# Patient Record
Sex: FEMALE | Race: WHITE | NOT HISPANIC OR LATINO | Employment: FULL TIME | URBAN - METROPOLITAN AREA
[De-identification: names, ages, dates, MRNs, and addresses within clinical notes are randomized per-mention and may not be internally consistent; named-entity substitution may affect disease eponyms.]

---

## 2017-02-10 ENCOUNTER — OFFICE VISIT (OUTPATIENT)
Dept: FAMILY MEDICINE | Facility: CLINIC | Age: 37
End: 2017-02-10
Payer: COMMERCIAL

## 2017-02-10 VITALS
SYSTOLIC BLOOD PRESSURE: 138 MMHG | BODY MASS INDEX: 43.64 KG/M2 | DIASTOLIC BLOOD PRESSURE: 76 MMHG | TEMPERATURE: 98.7 F | WEIGHT: 255.6 LBS | HEIGHT: 64 IN | HEART RATE: 84 BPM

## 2017-02-10 DIAGNOSIS — M77.8 LEFT SHOULDER TENDINITIS: ICD-10-CM

## 2017-02-10 DIAGNOSIS — Z00.00 ROUTINE GENERAL MEDICAL EXAMINATION AT A HEALTH CARE FACILITY: ICD-10-CM

## 2017-02-10 DIAGNOSIS — Z12.4 SCREENING FOR MALIGNANT NEOPLASM OF CERVIX: Primary | ICD-10-CM

## 2017-02-10 DIAGNOSIS — Z12.31 ENCOUNTER FOR SCREENING MAMMOGRAM FOR BREAST CANCER: ICD-10-CM

## 2017-02-10 DIAGNOSIS — J01.00 ACUTE NON-RECURRENT MAXILLARY SINUSITIS: ICD-10-CM

## 2017-02-10 PROCEDURE — 99395 PREV VISIT EST AGE 18-39: CPT | Performed by: NURSE PRACTITIONER

## 2017-02-10 PROCEDURE — G0145 SCR C/V CYTO,THINLAYER,RESCR: HCPCS | Performed by: NURSE PRACTITIONER

## 2017-02-10 PROCEDURE — 87624 HPV HI-RISK TYP POOLED RSLT: CPT | Performed by: NURSE PRACTITIONER

## 2017-02-10 PROCEDURE — 99213 OFFICE O/P EST LOW 20 MIN: CPT | Mod: 25 | Performed by: NURSE PRACTITIONER

## 2017-02-10 RX ORDER — NAPROXEN SODIUM 550 MG/1
TABLET ORAL
Qty: 60 TABLET | Refills: 0 | Status: SHIPPED | OUTPATIENT
Start: 2017-02-10 | End: 2017-12-08

## 2017-02-10 RX ORDER — CEFDINIR 300 MG/1
300 CAPSULE ORAL 2 TIMES DAILY
Qty: 20 CAPSULE | Refills: 0 | Status: SHIPPED | OUTPATIENT
Start: 2017-02-10 | End: 2017-03-08

## 2017-02-10 ASSESSMENT — ENCOUNTER SYMPTOMS
CHILLS: 0
FEVER: 0
NERVOUS/ANXIOUS: 0
NAUSEA: 1
PALPITATIONS: 0
SORE THROAT: 1
COUGH: 1
EYE DISCHARGE: 0
DIAPHORESIS: 0
LIGHT-HEADEDNESS: 0
ACTIVITY CHANGE: 0
ARTHRALGIAS: 1
WHEEZING: 0
POLYDIPSIA: 0
ABDOMINAL PAIN: 0
HEADACHES: 0
ROS SKIN COMMENTS: SELF BREAST EXAM WITHOUT AREA OF CONCERN.
FREQUENCY: 0
RHINORRHEA: 0
DIZZINESS: 0
CHEST TIGHTNESS: 0
FATIGUE: 0
DIFFICULTY URINATING: 0
CONSTIPATION: 0
VOMITING: 0
SHORTNESS OF BREATH: 0
SLEEP DISTURBANCE: 0
POLYPHAGIA: 0
SINUS PRESSURE: 0
NUMBNESS: 0
EYE ITCHING: 0
DIARRHEA: 0
DYSPHORIC MOOD: 0
ABDOMINAL DISTENTION: 0

## 2017-02-10 NOTE — PATIENT INSTRUCTIONS
Return for fasting lab appointment.         Preventive Health Recommendations  Female Ages 26 - 39  Yearly exam:   See your health care provider every year in order to    Review health changes.     Discuss preventive care.      Review your medicines if you your doctor has prescribed any.    Until age 30: Get a Pap test every three years (more often if you have had an abnormal result).    After age 30: Talk to your doctor about whether you should have a Pap test every 3 years or have a Pap test with HPV screening every 5 years.   You do not need a Pap test if your uterus was removed (hysterectomy) and you have not had cancer.  You should be tested each year for STDs (sexually transmitted diseases), if you're at risk.   Talk to your provider about how often to have your cholesterol checked.  If you are at risk for diabetes, you should have a diabetes test (fasting glucose).  Shots: Get a flu shot each year. Get a tetanus shot every 10 years.   Nutrition:     Eat at least 5 servings of fruits and vegetables each day.    Eat whole-grain bread, whole-wheat pasta and brown rice instead of white grains and rice.    Talk to your provider about Calcium and Vitamin D.     Lifestyle    Exercise at least 150 minutes a week (30 minutes a day, 5 days of the week). This will help you control your weight and prevent disease.    Limit alcohol to one drink per day.    No smoking.     Wear sunscreen to prevent skin cancer.    See your dentist every six months for an exam and cleaning.    Acute Sinusitis    Acute sinusitis is inflammation (irritation and swelling) of the sinuses. It is usually from a bacterial infection that follows an upper respiratory viral infection. Your doctor can help you find relief. Read on to learn more.  What is acute sinusitis?  Sinuses are air-filled spaces in the skull behind the face. They are kept moist and clean by a lining of mucosa. Things such as pollen, smoke, and chemical fumes can irritate the  mucosa. It can then become inflamed (swell up). As a response to irritation, the mucosa makes more mucus and other fluids. Tiny hairlike cilia cover the mucosa. Cilia help transport mucus toward the opening of the sinus. Too much mucus may cause the cilia to stop working. This blocks the sinus opening. A buildup of fluid in the sinuses then leads to symptoms such as pain and pressure. It can also encourage growth of bacteria in the sinuses.  Common symptoms of acute sinusitis  You may have:    Facial soreness pain    Headache    Fever    Postnasal drip (drainage in the back of the throat)    Congestion    Drainage that is thick and colored, instead of clear    Cough  Diagnosis of acute sinusitis  The doctor will ask about your symptoms and medical history. He or she will examine your ear, nose, and throat. X-rays are usually not needed. If your sinusitis recurs, you may have a culture to check for bacteria or imaging tests.     An evaluation will be done. A culture (sample of mucus) is sometimes taken to check for bacteria. If you have multiple bouts of sinusitis, imaging (X-rays or CAT scans) may be done to check for an anatomic cause of the infection.  Treatment of acute sinusitis  Treatment is designed to unblock the sinus opening and help the cilia work again. Antihistamine and decongestant medications may be prescribed. These can reduce inflammation and decrease fluid production. If a bacterial infection is present, it is treated with antibiotic medication for 10 to 14 days. This medication should be taken until it is gone, even if you feel better.    2568-3170 [employer]. [address] [place] [address], [place] [address]. All rights reserved. This information is not intended as a substitute for professional medical care. Always follow your healthcare professional's instructions.        Tendonitis  A tendon is the thick fibrous cord that joins muscle to bone and allows joints to move. When a tendon  becomes inflamed, it is called tendonitis. This can occur from overuse, injury, or infection. This usually involves the shoulders, forearm, wrist, hands and foot. Symptoms include pain, swelling and tenderness to the touch. Moving the joint increases the pain.  It takes 4 to 6 weeks for tendonitis to heal. It is treated by preventing motion of the tendon with a splint or brace and the use of anti-inflammatory medicine.  Home care    Some people find relief with ice packs. These can be crushed or cubed ice in a plastic bag or a bag of frozen vegetables wrapped in a thin towel. Other people get better relief with heat. This can include a hot shower, hot bath, or a moist towel warmed in a microwave. Try each and use the method that feels best, for 15 to 20 minutes several times a day.    Rest the inflamed joint and protect it from movement.    You may use over-the-counter ibuprofen or naproxen to treat pain and inflammation, unless another medicine was prescribed. If you can't take these medicines, acetaminophen may help with the pain, but does not treat inflammation. If you have chronic liver or kidney disease or ever had a stomach ulcer or gastrointestinal bleeding, talk with your doctor before using these medicines.    As your symptoms improve, begin gradual motion at the involved joint.  Follow-up care  Follow up with your healthcare provider if you are not improving after 5 days of treatment.  When to seek medical advice  Call your healthcare provider right away if any of these occur:    Redness over the painful area    Increasing pain or swelling at the joint    Fever (1 degree above your normal temperature) lasting 24 to 48 hours Or, whatever your healthcare provider told you to report based on your condition    4933-2898 [employer]. [address] [place] [address], [place] [address]. All rights reserved. This information is not intended as a substitute for professional medical care. Always follow your  healthcare professional's instructions.

## 2017-02-10 NOTE — MR AVS SNAPSHOT
After Visit Summary   2/10/2017    Jennifer Valentin    MRN: [id number]           Patient Information     Date Of Birth          1980        Visit Information        Provider Department      2/10/2017 2:00 PM Julissa Garza APRN [facility]        Today's Diagnoses     Screening for malignant neoplasm of cervix    -  1     Encounter for screening mammogram for breast cancer         Routine general medical examination at a health care facility         Acute non-recurrent maxillary sinusitis         Left shoulder tendinitis           Care Instructions      Return for fasting lab appointment.         Preventive Health Recommendations  Female Ages 26 - 39  Yearly exam:   See your health care provider every year in order to    Review health changes.     Discuss preventive care.      Review your medicines if you your doctor has prescribed any.    Until age 30: Get a Pap test every three years (more often if you have had an abnormal result).    After age 30: Talk to your doctor about whether you should have a Pap test every 3 years or have a Pap test with HPV screening every 5 years.   You do not need a Pap test if your uterus was removed (hysterectomy) and you have not had cancer.  You should be tested each year for STDs (sexually transmitted diseases), if you're at risk.   Talk to your provider about how often to have your cholesterol checked.  If you are at risk for diabetes, you should have a diabetes test (fasting glucose).  Shots: Get a flu shot each year. Get a tetanus shot every 10 years.   Nutrition:     Eat at least 5 servings of fruits and vegetables each day.    Eat whole-grain bread, whole-wheat pasta and brown rice instead of white grains and rice.    Talk to your provider about Calcium and Vitamin D.     Lifestyle    Exercise at least 150 minutes a week (30 minutes a day, 5 days of the week). This will help you control your weight and prevent disease.    Limit  alcohol to one drink per day.    No smoking.     Wear sunscreen to prevent skin cancer.    See your dentist every six months for an exam and cleaning.    Acute Sinusitis    Acute sinusitis is inflammation (irritation and swelling) of the sinuses. It is usually from a bacterial infection that follows an upper respiratory viral infection. Your doctor can help you find relief. Read on to learn more.  What is acute sinusitis?  Sinuses are air-filled spaces in the skull behind the face. They are kept moist and clean by a lining of mucosa. Things such as pollen, smoke, and chemical fumes can irritate the mucosa. It can then become inflamed (swell up). As a response to irritation, the mucosa makes more mucus and other fluids. Tiny hairlike cilia cover the mucosa. Cilia help transport mucus toward the opening of the sinus. Too much mucus may cause the cilia to stop working. This blocks the sinus opening. A buildup of fluid in the sinuses then leads to symptoms such as pain and pressure. It can also encourage growth of bacteria in the sinuses.  Common symptoms of acute sinusitis  You may have:    Facial soreness pain    Headache    Fever    Postnasal drip (drainage in the back of the throat)    Congestion    Drainage that is thick and colored, instead of clear    Cough  Diagnosis of acute sinusitis  The doctor will ask about your symptoms and medical history. He or she will examine your ear, nose, and throat. X-rays are usually not needed. If your sinusitis recurs, you may have a culture to check for bacteria or imaging tests.     An evaluation will be done. A culture (sample of mucus) is sometimes taken to check for bacteria. If you have multiple bouts of sinusitis, imaging (X-rays or CAT scans) may be done to check for an anatomic cause of the infection.  Treatment of acute sinusitis  Treatment is designed to unblock the sinus opening and help the cilia work again. Antihistamine and decongestant medications may be  prescribed. These can reduce inflammation and decrease fluid production. If a bacterial infection is present, it is treated with antibiotic medication for 10 to 14 days. This medication should be taken until it is gone, even if you feel better.    5613-2748 [employer]. [address] [place] [address]. All rights reserved. This information is not intended as a substitute for professional medical care. Always follow your healthcare professional's instructions.        Tendonitis  A tendon is the thick fibrous cord that joins muscle to bone and allows joints to move. When a tendon becomes inflamed, it is called tendonitis. This can occur from overuse, injury, or infection. This usually involves the shoulders, forearm, wrist, hands and foot. Symptoms include pain, swelling and tenderness to the touch. Moving the joint increases the pain.  It takes 4 to 6 weeks for tendonitis to heal. It is treated by preventing motion of the tendon with a splint or brace and the use of anti-inflammatory medicine.  Home care    Some people find relief with ice packs. These can be crushed or cubed ice in a plastic bag or a bag of frozen vegetables wrapped in a thin towel. Other people get better relief with heat. This can include a hot shower, hot bath, or a moist towel warmed in a microwave. Try each and use the method that feels best, for 15 to 20 minutes several times a day.    Rest the inflamed joint and protect it from movement.    You may use over-the-counter ibuprofen or naproxen to treat pain and inflammation, unless another medicine was prescribed. If you can't take these medicines, acetaminophen may help with the pain, but does not treat inflammation. If you have chronic liver or kidney disease or ever had a stomach ulcer or gastrointestinal bleeding, talk with your doctor before using these medicines.    As your symptoms improve, begin gradual motion at the involved joint.  Follow-up care  Follow up with  your healthcare provider if you are not improving after 5 days of treatment.  When to seek medical advice  Call your healthcare provider right away if any of these occur:    Redness over the painful area    Increasing pain or swelling at the joint    Fever (1 degree above your normal temperature) lasting 24 to 48 hours Or, whatever your healthcare provider told you to report based on your condition    9970-4445 [employer]. [address] [place] [address]. All rights reserved. This information is not intended as a substitute for professional medical care. Always follow your healthcare professional's instructions.              Follow-ups after your visit        Future tests that were ordered for you today     Open Future Orders        Priority Expected Expires Ordered    *MA Screening Digital Bilateral Routine  2/10/2018 2/10/2017    CBC with platelets differential Routine  2/10/2018 2/10/2017    Comprehensive metabolic panel Routine  2/10/2018 2/10/2017    TSH with free T4 reflex Routine  2/10/2018 2/10/2017    Lipid panel reflex to direct LDL Routine  2/10/2018 2/10/2017            Who to contact     Normal or non-critical lab and imaging results will be communicated to you by [employer], letter or phone within 4 business days after the clinic has received the results. If you do not hear from us within 7 days, please contact the clinic through [employer] or phone. If you have a critical or abnormal lab result, we will notify you by phone as soon as possible.  Submit refill requests through [employer] or call your pharmacy and they will forward the refill request to us. Please allow 3 business days for your refill to be completed.          If you need to speak with a  for additional information , please call: [phone]           Additional Information About Your Visit        Last 2 Left Information     [employer] Left lets you send messages to your doctor, view your test results, renew your  "prescriptions, schedule appointments and more. To sign up, go to [email or web address] . Click on \"Log in\" on the left side of the screen, which will take you to the Welcome page. Then click on \"Sign up Now\" on the right side of the page.     You will be asked to enter the access code listed below, as well as some personal information. Please follow the directions to create your username and password.     Your access code is: [email or web address]  Expires: 2017  2:33 PM     Your access code will  in 90 days. If you need help or a new code, please call your [facility] clinic or [phone].        Care EveryWhere ID     This is your Care EveryWhere ID. This could be used by other organizations to access your [facility] medical records  [id number]        Your Vitals Were     Pulse Temperature Height BMI (Body Mass Index) Last Period       84 98.7  F (37.1  C) (Tympanic) 5' 3.75\" (1.619 m) 44.23 kg/m2 2017        Blood Pressure from Last 3 Encounters:   02/10/17 138/76   11/17/15 122/78   07/15/15 120/74    Weight from Last 3 Encounters:   02/10/17 255 lb 9.6 oz (115.939 kg)   11/17/15 232 lb (105.235 kg)   07/15/15 219 lb (99.338 kg)              We Performed the Following     HPV High Risk Types DNA Cervical     Pap imaged thin layer screen with HPV - recommended age 30 - 65 years (select HPV order below)          Today's Medication Changes          These changes are accurate as of: 2/10/17  2:33 PM.  If you have any questions, ask your nurse or doctor.               Start taking these medicines.        Dose/Directions    cefdinir 300 MG capsule   Commonly known as:  OMNICEF   Used for:  Acute non-recurrent maxillary sinusitis   Started by:  Julissa Garza APRN CNP        Dose:  300 mg   Take 1 capsule (300 mg) by mouth 2 times daily   Quantity:  20 capsule   Refills:  0       naproxen sodium 550 MG tablet   Commonly known as:  ANAPROX   Used for:  Left shoulder tendinitis   Started by:  Greg" NEO Pappas CNP        Take twice per day with food for 14 days and then as needed   Quantity:  60 tablet   Refills:  0            Where to get your medicines      These medications were sent to [place] [address] [facility] [address] [facility]  [address] [facility] [address]     Phone:  [phone]    - cefdinir 300 MG capsule  - naproxen sodium 550 MG tablet             Primary Care Provider Office Phone # Fax #    Birgit Curtis [phone]       [facility] [address] [facility]   [place] [facility] [address]        Thank you!     Thank you for choosing [facility]  for your care. Our goal is always to provide you with excellent care. Hearing back from our patients is one way we can continue to improve our services. Please take a few minutes to complete the written survey that you may receive in the mail after your visit with us. Thank you!             Your Updated Medication List - Protect others around you: Learn how to safely use, store and throw away your medicines at www.disposemymeds.org.          This list is accurate as of: 2/10/17  2:33 PM.  Always use your most recent med list.                   Brand Name Dispense Instructions for use    cefdinir 300 MG capsule    OMNICEF    20 capsule    Take 1 capsule (300 mg) by mouth 2 times daily       naproxen sodium 550 MG tablet    ANAPROX    60 tablet    Take twice per day with food for 14 days and then as needed

## 2017-02-10 NOTE — PROGRESS NOTES
SUBJECTIVE:     CC: Jennifer Valentin is an 36 year old woman who presents for preventive health visit.     Healthy Habits:    Do you get at least three servings of calcium containing foods daily (dairy, green leafy vegetables, etc.)? yes    Amount of exercise or daily activities, outside of work: 30 mins per day    Problems taking medications regularly not applicable    Medication side effects: No    Have you had an eye exam in the past two years? yes    Do you see a dentist twice per year? yes    Do you have sleep apnea, excessive snoring or daytime drowsiness?no    Concerned about a sinus infection today. Has not been feeling well for the last 1.5 weeks. No fever at home that is aware of. Has been taking tylenol for headaches.     Last Pap: 2013-normal. Never an abnormal pap.   Last mammogram: Never. No family history of breast cancer.   Last BMD: Never   Last Colonoscopy: 8/14 due to diarrhea-normal. No family history of colon cancer  Last eye exam: Last month   Last dental exam: Every 6 mo   Last tetanus vaccine: 4/10  Last influenza vaccine: 9/16  Last shingles vaccine: Never   Last pneumonia vaccine: Never     Today's PHQ-2 Score:   PHQ-2 ( 1999 Pfizer) 2/10/2017 11/17/2015   Q1: Little interest or pleasure in doing things 0 0   Q2: Feeling down, depressed or hopeless 0 0   PHQ-2 Score 0 0       Abuse: Current or Past(Physical, Sexual or Emotional)- No  Do you feel safe in your environment - Yes    Social History   Substance Use Topics     Smoking status: Never Smoker      Smokeless tobacco: Never Used     Alcohol Use: No     The patient does not drink >3 drinks per day nor >7 drinks per week.    Recent Labs   Lab Test  06/03/16   0747  04/01/10   1045   CHOL  183  188   HDL  37*  47*   LDL  110*  126   TRIG  182*  80   CHOLHDLRATIO   --   4.0   NHDL  146*   --        Reviewed orders with patient.  Reviewed health maintenance and updated orders accordingly - Yes    Mammo Decision Support:  Mammogram not  appropriate for this patient based on age.    Pertinent mammograms are reviewed under the imaging tab.  History of abnormal Pap smear: NO - age 30- 65 PAP every 3 years recommended  All Histories reviewed and updated in Epic.      ROS:  Review of Systems   Constitutional: Negative for fever, chills, diaphoresis, activity change and fatigue.   HENT: Positive for ear pain and sore throat (in the AM when wakes). Negative for ear discharge, hearing loss, rhinorrhea and sinus pressure.    Eyes: Negative for discharge and itching.   Respiratory: Positive for cough (dry). Negative for chest tightness, shortness of breath and wheezing.    Cardiovascular: Negative for chest pain, palpitations and leg swelling.   Gastrointestinal: Positive for nausea (occ). Negative for vomiting, abdominal pain, diarrhea, constipation and abdominal distention.   Endocrine: Negative for cold intolerance, heat intolerance, polydipsia, polyphagia and polyuria.   Genitourinary: Negative for urgency, frequency, enuresis and difficulty urinating.        Periods come regularly. Has bloating and cramping with the. Used to be really heavy but now they are much lighter than used to be. Usually last 5-7 days total.    Musculoskeletal: Positive for arthralgias (left shoulder hurts. Will pop if moves the wrong way. ).   Skin: Negative for rash.        Self breast exam without area of concern.    Neurological: Negative for dizziness, light-headedness, numbness and headaches.   Psychiatric/Behavioral: Negative for sleep disturbance and dysphoric mood. The patient is not nervous/anxious.          Current Outpatient Prescriptions   Medication Sig Dispense Refill     cefdinir (OMNICEF) 300 MG capsule Take 1 capsule (300 mg) by mouth 2 times daily 20 capsule 0     naproxen sodium (ANAPROX) 550 MG tablet Take twice per day with food for 14 days and then as needed 60 tablet 0     Allergies   Allergen Reactions     Nka [No Known Allergies]      OBJECTIVE:     BP  "138/76 mmHg  Pulse 84  Temp(Src) 98.7  F (37.1  C) (Tympanic)  Ht 5' 3.75\" (1.619 m)  Wt 255 lb 9.6 oz (115.939 kg)  BMI 44.23 kg/m2  LMP 01/26/2017  EXAM:  Physical Exam   Constitutional: She appears well-developed and well-nourished.   HENT:   Head: Normocephalic and atraumatic.   Right Ear: Tympanic membrane and external ear normal. No middle ear effusion.   Left Ear: Tympanic membrane and external ear normal.  No middle ear effusion.   Nose: No mucosal edema. Right sinus exhibits maxillary sinus tenderness. Left sinus exhibits maxillary sinus tenderness.   Mouth/Throat: Uvula is midline, oropharynx is clear and moist and mucous membranes are normal.   Eyes: Pupils are equal, round, and reactive to light.   Neck: Normal range of motion. Carotid bruit is not present. No thyromegaly present.   Cardiovascular: Normal rate, regular rhythm and normal heart sounds.    Pulses:       Femoral pulses are 2+ on the right side, and 2+ on the left side.       Dorsalis pedis pulses are 2+ on the right side, and 2+ on the left side.        Posterior tibial pulses are 2+ on the right side, and 2+ on the left side.   Pulmonary/Chest: Effort normal and breath sounds normal. Right breast exhibits no inverted nipple, no mass, no nipple discharge, no skin change and no tenderness. Left breast exhibits no inverted nipple, no mass, no nipple discharge, no skin change and no tenderness. Breasts are symmetrical.   Abdominal: Soft. Normal appearance and bowel sounds are normal. There is no tenderness.   Genitourinary: Vagina normal. Uterus is not enlarged. Cervix exhibits no motion tenderness and no discharge. No vaginal discharge found.   Musculoskeletal:        Left shoulder: She exhibits decreased range of motion, tenderness, pain and decreased strength. She exhibits no bony tenderness, no swelling, no effusion and normal pulse.   Neurological: She is alert.   Skin: Skin is warm and dry. No rash noted.   Psychiatric: She has a " "normal mood and affect. Her behavior is normal.         ASSESSMENT/PLAN:     1. Screening for malignant neoplasm of cervix  - Pap imaged thin layer screen with HPV - recommended age 30 - 65 years (select HPV order below)  - HPV High Risk Types DNA Cervical    2. Encounter for screening mammogram for breast cancer  Would like to have a screening mammogram  Order placed, plans to call perself and set up   - *MA Screening Digital Bilateral; Future    3. Routine general medical examination at a health care facility  Screening guidelines reviewed.     - CBC with platelets differential; Future  - Comprehensive metabolic panel; Future  - TSH with free T4 reflex; Future  - Lipid panel reflex to direct LDL; Future    4. Acute non-recurrent maxillary sinusitis  Reviewed treatment plan and role of antibiotics  Instructed to call or return for :  --symptoms not improved in 3 days  --Worsening fever or headaches  --new, unexplained symptoms develop    - cefdinir (OMNICEF) 300 MG capsule; Take 1 capsule (300 mg) by mouth 2 times daily  Dispense: 20 capsule; Refill: 0    5. Left shoulder tendinitis  Educated on use of med  Apply ice  Notify if no improvement and will refer to PT  - naproxen sodium (ANAPROX) 550 MG tablet; Take twice per day with food for 14 days and then as needed  Dispense: 60 tablet; Refill: 0    COUNSELING:   Reviewed preventive health counseling, as reflected in patient instructions       Regular exercise       Vision screening       Colon cancer screening    BP Screening:   Last 3 BP Readings:    BP Readings from Last 3 Encounters:   02/10/17 138/76   11/17/15 122/78   07/15/15 120/74       The following was recommended to the patient:  Re-screen within 4 weeks and recommend lifestyle modifications       reports that she has never smoked. She has never used smokeless tobacco.    Estimated body mass index is 44.23 kg/(m^2) as calculated from the following:    Height as of this encounter: 5' 3.75\" (1.619 m).    " Weight as of this encounter: 255 lb 9.6 oz (115.939 kg).   Weight management plan: Recheck in 12 months    Counseling Resources:  ATP IV Guidelines  Pooled Cohorts Equation Calculator  Breast Cancer Risk Calculator  FRAX Risk Assessment  ICSI Preventive Guidelines  Dietary Guidelines for Americans, 2010  USDA's MyPlate  ASA Prophylaxis  Lung CA Screening    NEO Stone [facility]

## 2017-02-10 NOTE — NURSING NOTE
"Chief Complaint   Patient presents with     Physical       Initial /76 mmHg  Pulse 84  Temp(Src) 98.7  F (37.1  C) (Tympanic)  Ht 5' 3.75\" (1.619 m)  Wt 255 lb 9.6 oz (115.939 kg)  BMI 44.23 kg/m2  LMP 01/26/2017 Estimated body mass index is 44.23 kg/(m^2) as calculated from the following:    Height as of this encounter: 5' 3.75\" (1.619 m).    Weight as of this encounter: 255 lb 9.6 oz (115.939 kg).  Medication Reconciliation: complete    "

## 2017-02-10 NOTE — LETTER
[facility]  [address] [address]  [phone]      February 20, 2017    Jennifer Alarconahon  [address] [place] [id number]    Dear Jennifer,  We are happy to inform you that your PAP smear result from 2/10/17 is normal.  We are now able to do a follow up test on PAP smears. The DNA test is for HPV (Human Papilloma Virus). Cervical cancer is closely linked with certain types of HPV. Your result showed no evidence of high risk HPV.  Therefore we recommend you return in 3 years for your next pap smear and HPV test.  You will still need to return to the clinic every year for an annual exam and other preventive tests.  Please contact the clinic with any questions.  Sincerely,  NEO Stone CNP/rlm

## 2017-02-13 DIAGNOSIS — J01.00 ACUTE NON-RECURRENT MAXILLARY SINUSITIS: Primary | ICD-10-CM

## 2017-02-13 DIAGNOSIS — Z00.00 ROUTINE GENERAL MEDICAL EXAMINATION AT A HEALTH CARE FACILITY: ICD-10-CM

## 2017-02-13 DIAGNOSIS — E03.9 ACQUIRED HYPOTHYROIDISM: Primary | ICD-10-CM

## 2017-02-13 LAB
ALBUMIN SERPL-MCNC: 3.4 G/DL (ref 3.4–5)
ALP SERPL-CCNC: 121 U/L (ref 40–150)
ALT SERPL W P-5'-P-CCNC: 51 U/L (ref 0–50)
ANION GAP SERPL CALCULATED.3IONS-SCNC: 5 MMOL/L (ref 3–14)
AST SERPL W P-5'-P-CCNC: 55 U/L (ref 0–45)
BASOPHILS # BLD AUTO: 0 10E9/L (ref 0–0.2)
BASOPHILS NFR BLD AUTO: 0.2 %
BILIRUB SERPL-MCNC: 1.2 MG/DL (ref 0.2–1.3)
BUN SERPL-MCNC: 8 MG/DL (ref 7–30)
CALCIUM SERPL-MCNC: 8.4 MG/DL (ref 8.5–10.1)
CHLORIDE SERPL-SCNC: 105 MMOL/L (ref 94–109)
CHOLEST SERPL-MCNC: 179 MG/DL
CO2 SERPL-SCNC: 26 MMOL/L (ref 20–32)
CREAT SERPL-MCNC: 0.7 MG/DL (ref 0.52–1.04)
DIFFERENTIAL METHOD BLD: NORMAL
EOSINOPHIL # BLD AUTO: 0.1 10E9/L (ref 0–0.7)
EOSINOPHIL NFR BLD AUTO: 1.1 %
ERYTHROCYTE [DISTWIDTH] IN BLOOD BY AUTOMATED COUNT: 12.5 % (ref 10–15)
GFR SERPL CREATININE-BSD FRML MDRD: ABNORMAL ML/MIN/1.7M2
GLUCOSE SERPL-MCNC: 93 MG/DL (ref 70–99)
HCT VFR BLD AUTO: 43.2 % (ref 35–47)
HDLC SERPL-MCNC: 41 MG/DL
HGB BLD-MCNC: 14.6 G/DL (ref 11.7–15.7)
LDLC SERPL CALC-MCNC: 107 MG/DL
LYMPHOCYTES # BLD AUTO: 1.5 10E9/L (ref 0.8–5.3)
LYMPHOCYTES NFR BLD AUTO: 23.8 %
MCH RBC QN AUTO: 29.9 PG (ref 26.5–33)
MCHC RBC AUTO-ENTMCNC: 33.8 G/DL (ref 31.5–36.5)
MCV RBC AUTO: 89 FL (ref 78–100)
MONOCYTES # BLD AUTO: 0.4 10E9/L (ref 0–1.3)
MONOCYTES NFR BLD AUTO: 5.5 %
NEUTROPHILS # BLD AUTO: 4.4 10E9/L (ref 1.6–8.3)
NEUTROPHILS NFR BLD AUTO: 69.4 %
NONHDLC SERPL-MCNC: 138 MG/DL
PLATELET # BLD AUTO: 245 10E9/L (ref 150–450)
POTASSIUM SERPL-SCNC: 4.3 MMOL/L (ref 3.4–5.3)
PROT SERPL-MCNC: 7.5 G/DL (ref 6.8–8.8)
RBC # BLD AUTO: 4.88 10E12/L (ref 3.8–5.2)
SODIUM SERPL-SCNC: 136 MMOL/L (ref 133–144)
T4 FREE SERPL-MCNC: 0.98 NG/DL (ref 0.76–1.46)
TRIGL SERPL-MCNC: 154 MG/DL
TSH SERPL DL<=0.005 MIU/L-ACNC: 4.77 MU/L (ref 0.4–4)
WBC # BLD AUTO: 6.4 10E9/L (ref 4–11)

## 2017-02-13 PROCEDURE — 80061 LIPID PANEL: CPT | Performed by: NURSE PRACTITIONER

## 2017-02-13 PROCEDURE — 85025 COMPLETE CBC W/AUTO DIFF WBC: CPT | Performed by: NURSE PRACTITIONER

## 2017-02-13 PROCEDURE — 84439 ASSAY OF FREE THYROXINE: CPT | Performed by: NURSE PRACTITIONER

## 2017-02-13 PROCEDURE — 80053 COMPREHEN METABOLIC PANEL: CPT | Performed by: NURSE PRACTITIONER

## 2017-02-13 PROCEDURE — 36415 COLL VENOUS BLD VENIPUNCTURE: CPT | Performed by: NURSE PRACTITIONER

## 2017-02-13 PROCEDURE — 84443 ASSAY THYROID STIM HORMONE: CPT | Performed by: NURSE PRACTITIONER

## 2017-02-13 RX ORDER — LEVOTHYROXINE SODIUM 25 UG/1
25 TABLET ORAL DAILY
Qty: 30 TABLET | Refills: 1 | Status: SHIPPED | OUTPATIENT
Start: 2017-02-13 | End: 2017-03-30

## 2017-02-13 RX ORDER — AMOXICILLIN 500 MG/1
1000 CAPSULE ORAL 2 TIMES DAILY
Qty: 40 CAPSULE | Refills: 0 | Status: SHIPPED | OUTPATIENT
Start: 2017-02-13 | End: 2017-02-23

## 2017-02-14 LAB
COPATH REPORT: NORMAL
PAP: NORMAL

## 2017-02-16 LAB
FINAL DIAGNOSIS: NORMAL
HPV HR 12 DNA CVX QL NAA+PROBE: NEGATIVE
HPV16 DNA SPEC QL NAA+PROBE: NEGATIVE
HPV18 DNA SPEC QL NAA+PROBE: NEGATIVE
SPECIMEN DESCRIPTION: NORMAL

## 2017-02-20 ENCOUNTER — HOSPITAL ENCOUNTER (OUTPATIENT)
Dept: MAMMOGRAPHY | Facility: CLINIC | Age: 37
Discharge: HOME | End: 2017-02-20
Attending: NURSE PRACTITIONER | Admitting: NURSE PRACTITIONER
Payer: COMMERCIAL

## 2017-02-20 DIAGNOSIS — Z12.31 ENCOUNTER FOR SCREENING MAMMOGRAM FOR BREAST CANCER: ICD-10-CM

## 2017-02-20 PROCEDURE — G0202 SCR MAMMO BI INCL CAD: HCPCS

## 2017-02-21 ENCOUNTER — TELEPHONE (OUTPATIENT)
Dept: FAMILY MEDICINE | Facility: CLINIC | Age: 37
End: 2017-02-21

## 2017-02-21 DIAGNOSIS — B37.31 CANDIDIASIS OF VAGINA: ICD-10-CM

## 2017-02-21 RX ORDER — FLUCONAZOLE 150 MG/1
150 TABLET ORAL
Qty: 2 TABLET | Refills: 0 | Status: SHIPPED | OUTPATIENT
Start: 2017-02-21 | End: 2017-03-08

## 2017-02-21 NOTE — TELEPHONE ENCOUNTER
Patient is taking amoxicillin and has gotten a yeast infection.  Patient would like [facility] in [place] for the yeast infection is possible.  Please call on cell phone [phone].    Thank you  Gali Dolan, [place]

## 2017-02-21 NOTE — TELEPHONE ENCOUNTER
Called and spoke with  patient    She was taking antibiotic and has sx of a vaginal yeast infection. Has gotten before when she take an antibiotic  NKDA  Rx sent pt to use as ordered  Will call if need  MAURICIO Mckeon  Clinic  RN/Alessandro Dumont

## 2017-02-22 NOTE — PROGRESS NOTES
Jennifer,     Your mammogram is normal. Plan to repeat at age 40.    Please call or email with any additional questions or concerns  NEO Ryder CNP

## 2017-03-08 ENCOUNTER — OFFICE VISIT (OUTPATIENT)
Dept: FAMILY MEDICINE | Facility: CLINIC | Age: 37
End: 2017-03-08
Payer: COMMERCIAL

## 2017-03-08 VITALS
HEIGHT: 64 IN | OXYGEN SATURATION: 98 % | TEMPERATURE: 98.9 F | RESPIRATION RATE: 16 BRPM | DIASTOLIC BLOOD PRESSURE: 70 MMHG | SYSTOLIC BLOOD PRESSURE: 126 MMHG | BODY MASS INDEX: 43.5 KG/M2 | WEIGHT: 254.8 LBS | HEART RATE: 71 BPM

## 2017-03-08 DIAGNOSIS — J01.00 ACUTE MAXILLARY SINUSITIS, RECURRENCE NOT SPECIFIED: Primary | ICD-10-CM

## 2017-03-08 DIAGNOSIS — R07.0 THROAT PAIN: ICD-10-CM

## 2017-03-08 LAB
DEPRECATED S PYO AG THROAT QL EIA: NORMAL
MICRO REPORT STATUS: NORMAL
SPECIMEN SOURCE: NORMAL

## 2017-03-08 PROCEDURE — 99213 OFFICE O/P EST LOW 20 MIN: CPT | Performed by: NURSE PRACTITIONER

## 2017-03-08 PROCEDURE — 87081 CULTURE SCREEN ONLY: CPT | Performed by: NURSE PRACTITIONER

## 2017-03-08 PROCEDURE — 87880 STREP A ASSAY W/OPTIC: CPT | Performed by: NURSE PRACTITIONER

## 2017-03-08 RX ORDER — FLUTICASONE PROPIONATE 50 MCG
1-2 SPRAY, SUSPENSION (ML) NASAL DAILY
Qty: 1 BOTTLE | Refills: 1 | Status: SHIPPED | OUTPATIENT
Start: 2017-03-08 | End: 2017-12-08

## 2017-03-08 NOTE — PATIENT INSTRUCTIONS
Sinusitis (No Antibiotics)    The sinuses are air-filled spaces within the bones of the face. They connect to the inside of the nose. Sinusitis is an inflammation of the tissue lining the sinus cavity. Sinus inflammation can occur during a cold. It can also be due to allergies to pollens and other particles in the air. It can cause symptoms such as sinus congestion, headache, sore throat, facial swelling and fullness. It may also cause a low-grade fever. No infection is present, and no antibiotic treatment is needed.  Home care    Drink plenty of water, hot tea, and other liquids. This may help thin mucus. It also may promote sinus drainage.    Heat may help soothe painful areas of the face. Use a towel soaked in hot water. Or,  the shower and direct the hot spray onto your face. Using a vaporizer along with a menthol rub at night may also help.     An expectorant containing guaifenesin may help thin the mucus and promote drainage from the sinuses.    Over-the-counter decongestants may be used unless a similar medicine was prescribed. Nasal sprays work the fastest. Use one that contains phenylephrine or oxymetazoline. First blow the nose gently. Then use the spray. Do not use these medicines more often than directed on the label or symptoms may get worse. You may also use tablets containing pseudoephedrine. Avoid products that combine ingredients, because side effects may be increased. Read labels. You can also ask the pharmacist for help. (NOTE: Persons with high blood pressure should not use decongestants. They can raise blood pressure.)    Over-the-counter antihistamines may help if allergies contributed to your sinusitis.      Use acetaminophen or ibuprofen to control pain, unless another pain medicine was prescribed. (If you have chronic liver or kidney disease or ever had a stomach ulcer, talk with your doctor before using these medicines. Aspirin should never be used in anyone under 18 years of age  who is ill with a fever. It may cause severe liver damage.)    Use nasal rinses or irrigation as instructed by your health care provider.    Don't smoke. This can worsen symptoms.  Follow-up care  Follow up with your healthcare provider or our staff if you are not improving within the next week.  When to seek medical advice  Call your healthcare provider if any of these occur:    Green or yellow discharge from the nose or into the throat    Facial pain or headache becoming more severe    Stiff neck    Unusual drowsiness or confusion    Swelling of the forehead or eyelids    Vision problems, including blurred or double vision    Fever of 100.4 F (38 C) or higher, or as directed by your healthcare provider    Seizure    Breathing problems    Symptoms not resolving within 10 days    7200-0320 [employer]. [address] [place] [address], [place] [address]. All rights reserved. This information is not intended as a substitute for professional medical care. Always follow your healthcare professional's instructions.

## 2017-03-08 NOTE — MR AVS SNAPSHOT
After Visit Summary   3/8/2017    Jennifer Valentin    MRN: [id number]           Patient Information     Date Of Birth          1980        Visit Information        Provider Department      3/8/2017 1:20 PM Viv Coronado APRN [facility]        Today's Diagnoses     Acute maxillary sinusitis, recurrence not specified    -  1    Throat pain          Care Instructions      Sinusitis (No Antibiotics)    The sinuses are air-filled spaces within the bones of the face. They connect to the inside of the nose. Sinusitis is an inflammation of the tissue lining the sinus cavity. Sinus inflammation can occur during a cold. It can also be due to allergies to pollens and other particles in the air. It can cause symptoms such as sinus congestion, headache, sore throat, facial swelling and fullness. It may also cause a low-grade fever. No infection is present, and no antibiotic treatment is needed.  Home care    Drink plenty of water, hot tea, and other liquids. This may help thin mucus. It also may promote sinus drainage.    Heat may help soothe painful areas of the face. Use a towel soaked in hot water. Or,  the shower and direct the hot spray onto your face. Using a vaporizer along with a menthol rub at night may also help.     An expectorant containing guaifenesin may help thin the mucus and promote drainage from the sinuses.    Over-the-counter decongestants may be used unless a similar medicine was prescribed. Nasal sprays work the fastest. Use one that contains phenylephrine or oxymetazoline. First blow the nose gently. Then use the spray. Do not use these medicines more often than directed on the label or symptoms may get worse. You may also use tablets containing pseudoephedrine. Avoid products that combine ingredients, because side effects may be increased. Read labels. You can also ask the pharmacist for help. (NOTE: Persons with high blood pressure should not use  decongestants. They can raise blood pressure.)    Over-the-counter antihistamines may help if allergies contributed to your sinusitis.      Use acetaminophen or ibuprofen to control pain, unless another pain medicine was prescribed. (If you have chronic liver or kidney disease or ever had a stomach ulcer, talk with your doctor before using these medicines. Aspirin should never be used in anyone under 18 years of age who is ill with a fever. It may cause severe liver damage.)    Use nasal rinses or irrigation as instructed by your health care provider.    Don't smoke. This can worsen symptoms.  Follow-up care  Follow up with your healthcare provider or our staff if you are not improving within the next week.  When to seek medical advice  Call your healthcare provider if any of these occur:    Green or yellow discharge from the nose or into the throat    Facial pain or headache becoming more severe    Stiff neck    Unusual drowsiness or confusion    Swelling of the forehead or eyelids    Vision problems, including blurred or double vision    Fever of 100.4 F (38 C) or higher, or as directed by your healthcare provider    Seizure    Breathing problems    Symptoms not resolving within 10 days    5151-7114 [employer]. [address] [place] [address]. All rights reserved. This information is not intended as a substitute for professional medical care. Always follow your healthcare professional's instructions.              Follow-ups after your visit        Who to contact     Normal or non-critical lab and imaging results will be communicated to you by MyChart, letter or phone within 4 business days after the clinic has received the results. If you do not hear from us within 7 days, please contact the clinic through MyChart or phone. If you have a critical or abnormal lab result, we will notify you by phone as soon as possible.  Submit refill requests through [employer] or call your pharmacy and they  "will forward the refill request to us. Please allow 3 business days for your refill to be completed.          If you need to speak with a  for additional information , please call: [phone]           Additional Information About Your Visit        Cuculus Information     [employer] lets you send messages to your doctor, view your test results, renew your prescriptions, schedule appointments and more. To sign up, go to [email or web address] . Click on \"Log in\" on the left side of the screen, which will take you to the Welcome page. Then click on \"Sign up Now\" on the right side of the page.     You will be asked to enter the access code listed below, as well as some personal information. Please follow the directions to create your username and password.     Your access code is: [email or web address]  Expires: 2017  2:33 PM     Your access code will  in 90 days. If you need help or a new code, please call your [facility] clinic or [phone].        Care EveryWhere ID     This is your Care EveryWhere ID. This could be used by other organizations to access your [facility] medical records  [id number]        Your Vitals Were     Pulse Temperature Respirations Height Last Period Pulse Oximetry    71 98.9  F (37.2  C) (Tympanic) 16 5' 3.75\" (1.619 m) 2017 98%    BMI (Body Mass Index)                   44.08 kg/m2            Blood Pressure from Last 3 Encounters:   17 126/70   02/10/17 138/76   11/17/15 122/78    Weight from Last 3 Encounters:   17 254 lb 12.8 oz (115.6 kg)   02/10/17 255 lb 9.6 oz (115.9 kg)   11/17/15 232 lb (105.2 kg)              We Performed the Following     Beta strep group A culture     Strep, Rapid Screen          Today's Medication Changes          These changes are accurate as of: 3/8/17  1:45 PM.  If you have any questions, ask your nurse or doctor.               Start taking these medicines.        Dose/Directions    fluticasone 50 MCG/ACT spray   Commonly " known as:  FLONASE   Used for:  Acute maxillary sinusitis, recurrence not specified   Started by:  Viv Coronado APRN CNP        Dose:  1-2 spray   Spray 1-2 sprays into both nostrils daily   Quantity:  1 Bottle   Refills:  1            Where to get your medicines      These medications were sent to [place] [id number] [address]  [address] [facility] [address]     Phone:  [phone]     fluticasone 50 MCG/ACT spray                Primary Care Provider Office Phone # Fax #    Birgit Curtis [phone]       [facility] [address] [facility]   [facility] [address]        Thank you!     Thank you for choosing [facility]  for your care. Our goal is always to provide you with excellent care. Hearing back from our patients is one way we can continue to improve our services. Please take a few minutes to complete the written survey that you may receive in the mail after your visit with us. Thank you!             Your Updated Medication List - Protect others around you: Learn how to safely use, store and throw away your medicines at www.disposemymeds.org.          This list is accurate as of: 3/8/17  1:45 PM.  Always use your most recent med list.                   Brand Name Dispense Instructions for use    fluticasone 50 MCG/ACT spray    FLONASE    1 Bottle    Spray 1-2 sprays into both nostrils daily       levothyroxine 25 MCG tablet    SYNTHROID/LEVOTHROID    30 tablet    Take 1 tablet (25 mcg) by mouth daily       naproxen sodium 550 MG tablet    ANAPROX    60 tablet    Take twice per day with food for 14 days and then as needed

## 2017-03-08 NOTE — LETTER
[facility]  [address] [address]  Phone: [phone]    March 10, 2017    Jennifer Valentin  [address] [address]              Dear Ms. Valentin,      The results of your recent throat culture were negative. If you have any further questions or concerns please contact the clinic.            Sincerely,      [facility] Provider

## 2017-03-08 NOTE — NURSING NOTE
"Chief Complaint   Patient presents with     Pharyngitis       Initial /70 (BP Location: Left arm, Patient Position: Chair, Cuff Size: Adult Large)  Pulse 71  Temp 98.9  F (37.2  C) (Tympanic)  Resp 16  Ht 5' 3.75\" (1.619 m)  Wt 254 lb 12.8 oz (115.6 kg)  LMP 01/26/2017  SpO2 98%  BMI 44.08 kg/m2 Estimated body mass index is 44.08 kg/(m^2) as calculated from the following:    Height as of this encounter: 5' 3.75\" (1.619 m).    Weight as of this encounter: 254 lb 12.8 oz (115.6 kg).  Medication Reconciliation: complete  "

## 2017-03-08 NOTE — PROGRESS NOTES
SUBJECTIVE:                                                    Jennifer Valentin is a 36 year old female who presents to clinic today for the following health issues:      ENT Symptoms             Symptoms: cc Present Absent Comment   Fever/Chills   x    Fatigue  x     Muscle Aches   x    Eye Irritation  x  itching   Sneezing  x     Nasal Yanick/Drg  x  Green drainage, post nasal drainage   Sinus Pressure/Pain  x     Loss of smell   x    Dental pain   x    Sore Throat x      Swollen Glands  x  Left sided neck pain   Ear Pain/Fullness  x  Left ear pain off and on   Cough   x    Wheeze   x    Chest Pain   x    Shortness of breath   x    Rash   x    Other  x  Headaches, frontal     Symptom duration:  3 days   Symptom severity:  mild   Treatments tried:  treated with amoxicillin for sinus infection 1 1/2 weeks ago, tylenol. Feels sinus infection cleared initially but maybe coming back. Denies allergies.    Contacts:   pos for strep       Problem list and histories reviewed & adjusted, as indicated.  Additional history: as documented    Patient Active Problem List   Diagnosis     Obesity     Heavy period     CARDIOVASCULAR SCREENING; LDL GOAL LESS THAN 160     Encounter for routine gynecological examination     Contraception -- current partner vasectomy     Family history of thyroid disease     Subclinical hypothyroidism     Past Surgical History   Procedure Laterality Date     C  delivery only       Laparoscopic cholecystectomy with cholangiograms  2014     Procedure: LAPAROSCOPIC CHOLECYSTECTOMY WITH CHOLANGIOGRAMS;  Surgeon: Julius Butt MD;  Location: [facility]     Colonoscopy  2014     Procedure: COLONOSCOPY;  Surgeon: Lyndon Stark MD;  Location: [facility]       Social History   Substance Use Topics     Smoking status: Never Smoker     Smokeless tobacco: Never Used     Alcohol use No     Family History   Problem Relation Age of Onset     Thyroid Disease Mother 30     hypothyroidism.       "Musculoskeletal Disorder Father       in a motorcycle accident , age 20's or early 30's     Genitourinary Problems Son      kidney reflux     C.A.D. No family hx of      DIABETES No family hx of      Hypertension No family hx of      Breast Cancer No family hx of      Cancer - colorectal No family hx of      Prostate Cancer No family hx of      Colon Cancer No family hx of      Coronary Artery Disease No family hx of            Reviewed and updated as needed this visit by clinical staff  Tobacco  Allergies  Med Hx  Surg Hx  Fam Hx  Soc Hx      Reviewed and updated as needed this visit by Provider         ROS:  Constitutional, HEENT, cardiovascular, pulmonary, gi and gu systems are negative, except as otherwise noted.    OBJECTIVE:                                                    /70 (BP Location: Left arm, Patient Position: Chair, Cuff Size: Adult Large)  Pulse 71  Temp 98.9  F (37.2  C) (Tympanic)  Resp 16  Ht 5' 3.75\" (1.619 m)  Wt 254 lb 12.8 oz (115.6 kg)  LMP 2017  SpO2 98%  BMI 44.08 kg/m2  Body mass index is 44.08 kg/(m^2).  GENERAL: healthy, alert and no distress  EYES: Eyes grossly normal to inspection, PERRL and conjunctivae and sclerae normal  HENT: normal cephalic/atraumatic, both ears: clear effusion, nose and mouth without ulcers or lesions, nasal mucosa edematous , rhinorrhea yellow, oropharynx clear, oral mucous membranes moist and sinuses: not tender  NECK: left side anterior cervical adenopathy.  RESP: lungs clear to auscultation - no rales, rhonchi or wheezes  CV: regular rates and rhythm, normal S1 S2, no S3 or S4 and no murmur, click or rub  SKIN: no suspicious lesions or rashes  NEURO: Normal strength and tone, mentation intact and speech normal    Diagnostic Test Results:  none      ASSESSMENT/PLAN:                                                        ICD-10-CM    1. Acute maxillary sinusitis, recurrence not specified J01.00 fluticasone (FLONASE) 50 MCG/ACT spray   2. " Throat pain R07.0 Strep, Rapid Screen     Beta strep group A culture       FUTURE APPOINTMENTS:       - Follow-up in 1 week for persistent sx, sooner PRN new or worsening sx. Come into clinic for recheck if any thing new or worsening.  May call if symptoms are not resolved with Flonase in next 1-2 weeks and will treat her with Augmentin 875mg BID x 14 days.    Patient Instructions     Sinusitis (No Antibiotics)    The sinuses are air-filled spaces within the bones of the face. They connect to the inside of the nose. Sinusitis is an inflammation of the tissue lining the sinus cavity. Sinus inflammation can occur during a cold. It can also be due to allergies to pollens and other particles in the air. It can cause symptoms such as sinus congestion, headache, sore throat, facial swelling and fullness. It may also cause a low-grade fever. No infection is present, and no antibiotic treatment is needed.  Home care    Drink plenty of water, hot tea, and other liquids. This may help thin mucus. It also may promote sinus drainage.    Heat may help soothe painful areas of the face. Use a towel soaked in hot water. Or,  the shower and direct the hot spray onto your face. Using a vaporizer along with a menthol rub at night may also help.     An expectorant containing guaifenesin may help thin the mucus and promote drainage from the sinuses.    Over-the-counter decongestants may be used unless a similar medicine was prescribed. Nasal sprays work the fastest. Use one that contains phenylephrine or oxymetazoline. First blow the nose gently. Then use the spray. Do not use these medicines more often than directed on the label or symptoms may get worse. You may also use tablets containing pseudoephedrine. Avoid products that combine ingredients, because side effects may be increased. Read labels. You can also ask the pharmacist for help. (NOTE: Persons with high blood pressure should not use decongestants. They can raise  blood pressure.)    Over-the-counter antihistamines may help if allergies contributed to your sinusitis.      Use acetaminophen or ibuprofen to control pain, unless another pain medicine was prescribed. (If you have chronic liver or kidney disease or ever had a stomach ulcer, talk with your doctor before using these medicines. Aspirin should never be used in anyone under 18 years of age who is ill with a fever. It may cause severe liver damage.)    Use nasal rinses or irrigation as instructed by your health care provider.    Don't smoke. This can worsen symptoms.  Follow-up care  Follow up with your healthcare provider or our staff if you are not improving within the next week.  When to seek medical advice  Call your healthcare provider if any of these occur:    Green or yellow discharge from the nose or into the throat    Facial pain or headache becoming more severe    Stiff neck    Unusual drowsiness or confusion    Swelling of the forehead or eyelids    Vision problems, including blurred or double vision    Fever of 100.4 F (38 C) or higher, or as directed by your healthcare provider    Seizure    Breathing problems    Symptoms not resolving within 10 days    2673-7299 [employer]. [address] [place] [address], [place] [address]. All rights reserved. This information is not intended as a substitute for professional medical care. Always follow your healthcare professional's instructions.            NEO [facility]

## 2017-03-10 LAB
BACTERIA SPEC CULT: NORMAL
MICRO REPORT STATUS: NORMAL
SPECIMEN SOURCE: NORMAL

## 2017-03-24 ENCOUNTER — TELEPHONE (OUTPATIENT)
Dept: LAB | Facility: CLINIC | Age: 37
End: 2017-03-24

## 2017-03-24 DIAGNOSIS — E03.8 SUBCLINICAL HYPOTHYROIDISM: Primary | ICD-10-CM

## 2017-03-24 NOTE — TELEPHONE ENCOUNTER
Patient coming in for lab work on Wednesday, 3/29/17.           Patient is requesting a TSH.          Please place future orders. Thanks

## 2017-03-29 DIAGNOSIS — E03.8 SUBCLINICAL HYPOTHYROIDISM: ICD-10-CM

## 2017-03-29 LAB
T3FREE SERPL-MCNC: 3 PG/ML (ref 2.3–4.2)
TSH SERPL DL<=0.005 MIU/L-ACNC: 3.39 MU/L (ref 0.4–4)

## 2017-03-29 PROCEDURE — 99000 SPECIMEN HANDLING OFFICE-LAB: CPT | Performed by: FAMILY MEDICINE

## 2017-03-29 PROCEDURE — 84481 FREE ASSAY (FT-3): CPT | Performed by: FAMILY MEDICINE

## 2017-03-29 PROCEDURE — 84443 ASSAY THYROID STIM HORMONE: CPT | Performed by: FAMILY MEDICINE

## 2017-03-29 PROCEDURE — 36415 COLL VENOUS BLD VENIPUNCTURE: CPT | Performed by: FAMILY MEDICINE

## 2017-03-29 PROCEDURE — 84445 ASSAY OF TSI GLOBULIN: CPT | Mod: 90 | Performed by: FAMILY MEDICINE

## 2017-03-30 ENCOUNTER — OFFICE VISIT (OUTPATIENT)
Dept: FAMILY MEDICINE | Facility: CLINIC | Age: 37
End: 2017-03-30
Payer: COMMERCIAL

## 2017-03-30 VITALS
DIASTOLIC BLOOD PRESSURE: 76 MMHG | SYSTOLIC BLOOD PRESSURE: 118 MMHG | HEART RATE: 74 BPM | BODY MASS INDEX: 44.08 KG/M2 | WEIGHT: 254.8 LBS | TEMPERATURE: 98.3 F | OXYGEN SATURATION: 99 %

## 2017-03-30 DIAGNOSIS — H69.92 DYSFUNCTION OF EUSTACHIAN TUBE, LEFT: ICD-10-CM

## 2017-03-30 DIAGNOSIS — Z20.818 STREP THROAT EXPOSURE: Primary | ICD-10-CM

## 2017-03-30 DIAGNOSIS — E03.9 ACQUIRED HYPOTHYROIDISM: ICD-10-CM

## 2017-03-30 LAB
DEPRECATED S PYO AG THROAT QL EIA: NORMAL
MICRO REPORT STATUS: NORMAL
SPECIMEN SOURCE: NORMAL

## 2017-03-30 PROCEDURE — 99213 OFFICE O/P EST LOW 20 MIN: CPT | Performed by: NURSE PRACTITIONER

## 2017-03-30 PROCEDURE — 87081 CULTURE SCREEN ONLY: CPT | Performed by: NURSE PRACTITIONER

## 2017-03-30 PROCEDURE — 87880 STREP A ASSAY W/OPTIC: CPT | Performed by: NURSE PRACTITIONER

## 2017-03-30 RX ORDER — METHYLPREDNISOLONE 4 MG
TABLET, DOSE PACK ORAL
Qty: 21 TABLET | Refills: 0 | Status: SHIPPED | OUTPATIENT
Start: 2017-03-30 | End: 2017-12-08

## 2017-03-30 RX ORDER — LEVOTHYROXINE SODIUM 25 UG/1
25 TABLET ORAL DAILY
Qty: 90 TABLET | Refills: 3 | Status: SHIPPED | OUTPATIENT
Start: 2017-03-30 | End: 2018-02-05

## 2017-03-30 ASSESSMENT — ENCOUNTER SYMPTOMS
CONSTIPATION: 0
DIZZINESS: 0
HEADACHES: 0
FATIGUE: 1
RHINORRHEA: 0
SINUS PRESSURE: 0
DIARRHEA: 0
LIGHT-HEADEDNESS: 0
DIAPHORESIS: 0
EYE DISCHARGE: 0
SORE THROAT: 0
CHILLS: 0
FEVER: 0
EYE ITCHING: 0
NAUSEA: 0
COUGH: 0
SHORTNESS OF BREATH: 0
WHEEZING: 0

## 2017-03-30 NOTE — NURSING NOTE
"Chief Complaint   Patient presents with     Otalgia       Initial /76 (BP Location: Right arm, Patient Position: Chair, Cuff Size: Adult Large)  Pulse 74  Temp 98.3  F (36.8  C) (Tympanic)  Wt 254 lb 12.8 oz (115.6 kg)  LMP 03/17/2017 (Approximate)  SpO2 99%  BMI 44.08 kg/m2 Estimated body mass index is 44.08 kg/(m^2) as calculated from the following:    Height as of 3/8/17: 5' 3.75\" (1.619 m).    Weight as of this encounter: 254 lb 12.8 oz (115.6 kg).  Medication Reconciliation: complete     April TAYA Andrews      "

## 2017-03-30 NOTE — MR AVS SNAPSHOT
"              After Visit Summary   3/30/2017    Jennifer Valentin    MRN: [id number]           Patient Information     Date Of Birth          1980        Visit Information        Provider Department      3/30/2017 11:00 AM Julissa Garza APRN [facility]        Today's Diagnoses     Strep throat exposure    -  1    Dysfunction of eustachian tube, left        Acquired hypothyroidism        Subclinical hypothyroidism           Follow-ups after your visit        Who to contact     Normal or non-critical lab and imaging results will be communicated to you by [employer], letter or phone within 4 business days after the clinic has received the results. If you do not hear from us within 7 days, please contact the clinic through [employer] or phone. If you have a critical or abnormal lab result, we will notify you by phone as soon as possible.  Submit refill requests through [employer] or call your pharmacy and they will forward the refill request to us. Please allow 3 business days for your refill to be completed.          If you need to speak with a  for additional information , please call: [phone]           Additional Information About Your Visit        [employer] Information     [employer] lets you send messages to your doctor, view your test results, renew your prescriptions, schedule appointments and more. To sign up, go to [email or web address] . Click on \"Log in\" on the left side of the screen, which will take you to the Welcome page. Then click on \"Sign up Now\" on the right side of the page.     You will be asked to enter the access code listed below, as well as some personal information. Please follow the directions to create your username and password.     Your access code is: [email or web address]  Expires: 2017  3:33 PM     Your access code will  in 90 days. If you need help or a new code, please call your [facility] or [phone].        Care EveryWhere ID "     This is your Care EveryWhere ID. This could be used by other organizations to access your [facility] medical records  [id number]        Your Vitals Were     Pulse Temperature Last Period Pulse Oximetry BMI (Body Mass Index)       74 98.3  F (36.8  C) (Tympanic) 03/17/2017 (Approximate) 99% 44.08 kg/m2        Blood Pressure from Last 3 Encounters:   03/30/17 118/76   03/08/17 126/70   02/10/17 138/76    Weight from Last 3 Encounters:   03/30/17 254 lb 12.8 oz (115.6 kg)   03/08/17 254 lb 12.8 oz (115.6 kg)   02/10/17 255 lb 9.6 oz (115.9 kg)              We Performed the Following     Rapid strep screen          Today's Medication Changes          These changes are accurate as of: 3/30/17 11:29 AM.  If you have any questions, ask your nurse or doctor.               Start taking these medicines.        Dose/Directions    methylPREDNISolone 4 MG tablet   Commonly known as:  MEDROL DOSEPAK   Used for:  Dysfunction of eustachian tube, left   Started by:  Julissa Garza APRN CNP        Follow package instructions   Quantity:  21 tablet   Refills:  0            Where to get your medicines      These medications were sent to [place] [id number] [place] [address] [address] [address]     Phone:  [phone]     levothyroxine 25 MCG tablet    methylPREDNISolone 4 MG tablet                Primary Care Provider Office Phone # Fax #    Birgit Curtis [phone]       [facility] [address] [facility]   [facility] [address]        Thank you!     Thank you for choosing [facility]  for your care. Our goal is always to provide you with excellent care. Hearing back from our patients is one way we can continue to improve our services. Please take a few minutes to complete the written survey that you may receive in the mail after your visit with us. Thank you!             Your Updated Medication List - Protect others around you: Learn how to  safely use, store and throw away your medicines at www.disposemymeds.org.          This list is accurate as of: 3/30/17 11:29 AM.  Always use your most recent med list.                   Brand Name Dispense Instructions for use    fluticasone 50 MCG/ACT spray    FLONASE    1 Bottle    Spray 1-2 sprays into both nostrils daily       levothyroxine 25 MCG tablet    SYNTHROID/LEVOTHROID    90 tablet    Take 1 tablet (25 mcg) by mouth daily       methylPREDNISolone 4 MG tablet    MEDROL DOSEPAK    21 tablet    Follow package instructions       naproxen sodium 550 MG tablet    ANAPROX    60 tablet    Take twice per day with food for 14 days and then as needed

## 2017-03-30 NOTE — PROGRESS NOTES
SUBJECTIVE:                                                    Jennifer Valentin is a 36 year old female who presents to clinic today for the following health issues:      ENT Symptoms             Symptoms: cc Present Absent Comment   Fever/Chills   x    Fatigue  x     Muscle Aches   x    Eye Irritation   x    Sneezing  x  Occasional    Nasal Yanick/Drg  x     Sinus Pressure/Pain   x    Loss of smell   x    Dental pain   x    Sore Throat   x    Swollen Glands   x    Ear Pain/Fullness x x  Left ear pain, can hear crackling   Cough   x    Wheeze   x    Chest Pain   x    Shortness of breath   x    Rash   x    Other   x      Symptom duration:  4 days   Symptom severity:  mild   Treatments tried:  none   Contacts:   with strep, his symptoms first started as ear pain     Left ear feels like has been under water for the last 4 days. Having a lot drainage and congestion. Feels like hearing is less on the left. Right ear feels fine. Occasionally will have dizziness and lightheaded.     Problem list and histories reviewed & adjusted, as indicated.  Additional history: as documented    Current Outpatient Prescriptions   Medication Sig Dispense Refill     methylPREDNISolone (MEDROL DOSEPAK) 4 MG tablet Follow package instructions 21 tablet 0     levothyroxine (SYNTHROID/LEVOTHROID) 25 MCG tablet Take 1 tablet (25 mcg) by mouth daily 90 tablet 3     fluticasone (FLONASE) 50 MCG/ACT spray Spray 1-2 sprays into both nostrils daily (Patient not taking: Reported on 3/30/2017) 1 Bottle 1     [DISCONTINUED] levothyroxine (SYNTHROID/LEVOTHROID) 25 MCG tablet Take 1 tablet (25 mcg) by mouth daily 30 tablet 1     naproxen sodium (ANAPROX) 550 MG tablet Take twice per day with food for 14 days and then as needed (Patient not taking: Reported on 3/8/2017) 60 tablet 0     Allergies   Allergen Reactions     Nka [No Known Allergies]        Reviewed and updated as needed this visit by clinical staff  Tobacco  Allergies  Meds  Med Hx   Surg Hx  Fam Hx  Soc Hx      Reviewed and updated as needed this visit by Provider         ROS:  Review of Systems   Constitutional: Positive for fatigue. Negative for chills, diaphoresis and fever.   HENT: Positive for congestion, hearing loss (decreased on left) and sneezing. Negative for ear discharge, ear pain, rhinorrhea, sinus pressure and sore throat.         Left ear feel like is under water     Eyes: Negative for discharge and itching.   Respiratory: Negative for cough, shortness of breath and wheezing.    Gastrointestinal: Negative for constipation, diarrhea and nausea.   Skin: Negative for rash.   Neurological: Negative for dizziness, light-headedness and headaches.         OBJECTIVE:                                                    /76 (BP Location: Right arm, Patient Position: Chair, Cuff Size: Adult Large)  Pulse 74  Temp 98.3  F (36.8  C) (Tympanic)  Wt 254 lb 12.8 oz (115.6 kg)  LMP 03/17/2017 (Approximate)  SpO2 99%  BMI 44.08 kg/m2  Body mass index is 44.08 kg/(m^2).  Physical Exam   Constitutional: She appears well-developed.   HENT:   Right Ear: Tympanic membrane and external ear normal.   Left Ear: External ear normal. Tympanic membrane is bulging.   Nose: No mucosal edema or rhinorrhea.   Left ear slightly pink   Cardiovascular: Normal rate, regular rhythm and normal heart sounds.    Pulmonary/Chest: Effort normal and breath sounds normal.   Abdominal: Soft. Bowel sounds are normal.   Neurological: She is alert.   Skin: Skin is warm and dry.   Psychiatric: She has a normal mood and affect.        ASSESSMENT/PLAN:                                                    1. Strep throat exposure  - Rapid strep screen  - Beta strep group A culture    2. Dysfunction of eustachian tube, left  Educated on use of med  Notify if hearing does not return to baseline  Notify if pain increases to left ear and will order antibiotic at that time if indicated   - methylPREDNISolone (MEDROL  DOSEPAK) 4 MG tablet; Follow package instructions  Dispense: 21 tablet; Refill: 0    3. Acquired hypothyroidism  Recent labs reviewed  Refill of med sent, plan to follow up in 1 year or sooner if needed   - levothyroxine (SYNTHROID/LEVOTHROID) 25 MCG tablet; Take 1 tablet (25 mcg) by mouth daily  Dispense: 90 tablet; Refill: 3            [facility]

## 2017-03-31 LAB — TSI SER-ACNC: NORMAL

## 2017-04-01 LAB
BACTERIA SPEC CULT: NORMAL
MICRO REPORT STATUS: NORMAL
SPECIMEN SOURCE: NORMAL

## 2017-12-08 ENCOUNTER — OFFICE VISIT (OUTPATIENT)
Dept: FAMILY MEDICINE | Facility: CLINIC | Age: 37
End: 2017-12-08
Payer: COMMERCIAL

## 2017-12-08 VITALS
BODY MASS INDEX: 45.71 KG/M2 | HEART RATE: 88 BPM | TEMPERATURE: 98.5 F | WEIGHT: 264.2 LBS | OXYGEN SATURATION: 98 % | SYSTOLIC BLOOD PRESSURE: 120 MMHG | DIASTOLIC BLOOD PRESSURE: 74 MMHG

## 2017-12-08 DIAGNOSIS — J01.00 ACUTE NON-RECURRENT MAXILLARY SINUSITIS: Primary | ICD-10-CM

## 2017-12-08 PROCEDURE — 99213 OFFICE O/P EST LOW 20 MIN: CPT | Performed by: NURSE PRACTITIONER

## 2017-12-08 RX ORDER — CEFDINIR 300 MG/1
300 CAPSULE ORAL 2 TIMES DAILY
Qty: 20 CAPSULE | Refills: 0 | Status: SHIPPED | OUTPATIENT
Start: 2017-12-08 | End: 2017-12-11

## 2017-12-08 ASSESSMENT — ENCOUNTER SYMPTOMS: COUGH: 1

## 2017-12-08 NOTE — PATIENT INSTRUCTIONS
These are general instructions and may not be specific to you. Please call, email or follow up if you have any questions or concerns.     Sinusitis (Antibiotic Treatment)    The sinuses are air-filled spaces within the bones of the face. They connect to the inside of the nose. Sinusitis is an inflammation of the tissue lining the sinus cavity. Sinus inflammation can occur during a cold. It can also be due to allergies to pollens and other particles in the air. Sinusitis can cause symptoms of sinus congestion and fullness. A sinus infection causes fever, headache and facial pain. There is often green or yellow drainage from the nose or into the back of the throat (post-nasal drip). You have been given antibiotics to treat this condition.  Home care:    Take the full course of antibiotics as instructed. Do not stop taking them, even if you feel better.    Drink plenty of water, hot tea, and other liquids. This may help thin mucus. It also may promote sinus drainage.    Heat may help soothe painful areas of the face. Use a towel soaked in hot water. Or,  the shower and direct the hot spray onto your face. Using a vaporizer along with a menthol rub at night may also help.     An expectorant containing guaifenesin may help thin the mucus and promote drainage from the sinuses.    Over-the-counter decongestants may be used unless a similar medicine was prescribed. Nasal sprays work the fastest. Use one that contains phenylephrine or oxymetazoline. First blow the nose gently. Then use the spray. Do not use these medicines more often than directed on the label or symptoms may get worse. You may also use tablets containing pseudoephedrine. Avoid products that combine ingredients, because side effects may be increased. Read labels. You can also ask the pharmacist for help. (NOTE: Persons with high blood pressure should not use decongestants. They can raise blood pressure.)    Over-the-counter antihistamines may help if  allergies contributed to your sinusitis.      Do not use nasal rinses or irrigation during an acute sinus infection, unless told to by your health care provider. Rinsing may spread the infection to other sinuses.    Use acetaminophen or ibuprofen to control pain, unless another pain medicine was prescribed. (If you have chronic liver or kidney disease or ever had a stomach ulcer, talk with your doctor before using these medicines. Aspirin should never be used in anyone under 18 years of age who is ill with a fever. It may cause severe liver damage.)    Don't smoke. This can worsen symptoms.  Follow-up care  Follow up with your healthcare provider or our staff if you are not improving within the next week.  When to seek medical advice  Call your healthcare provider if any of these occur:    Facial pain or headache becoming more severe    Stiff neck    Unusual drowsiness or confusion    Swelling of the forehead or eyelids    Vision problems, including blurred or double vision    Fever of 100.4 F (38 C) or higher, or as directed by your healthcare provider    Seizure    Breathing problems    Symptoms not resolving within 10 days  Date Last Reviewed: 4/13/2015 2000-2017 [employer]. [address] [place] [address], [place] [address]. All rights reserved. This information is not intended as a substitute for professional medical care. Always follow your healthcare professional's instructions.

## 2017-12-08 NOTE — NURSING NOTE
"Chief Complaint   Patient presents with     URI       Initial /74 (BP Location: Right arm, Patient Position: Sitting, Cuff Size: Adult Large)  Pulse 88  Temp 98.5  F (36.9  C) (Tympanic)  Wt 264 lb 3.2 oz (119.8 kg)  LMP 12/06/2017  SpO2 98%  BMI 45.71 kg/m2 Estimated body mass index is 45.71 kg/(m^2) as calculated from the following:    Height as of 3/8/17: 5' 3.75\" (1.619 m).    Weight as of this encounter: 264 lb 3.2 oz (119.8 kg).  Medication Reconciliation: complete     April TAYA Andrews      "

## 2017-12-08 NOTE — MR AVS SNAPSHOT
After Visit Summary   12/8/2017    Jennifer Valentin    MRN: [id number]           Patient Information     Date Of Birth          1980        Visit Information        Provider Department      12/8/2017 3:40 PM Julissa Garza APRN [facility]        Today's Diagnoses     Acute non-recurrent maxillary sinusitis    -  1      Care Instructions    These are general instructions and may not be specific to you. Please call, email or follow up if you have any questions or concerns.     Sinusitis (Antibiotic Treatment)    The sinuses are air-filled spaces within the bones of the face. They connect to the inside of the nose. Sinusitis is an inflammation of the tissue lining the sinus cavity. Sinus inflammation can occur during a cold. It can also be due to allergies to pollens and other particles in the air. Sinusitis can cause symptoms of sinus congestion and fullness. A sinus infection causes fever, headache and facial pain. There is often green or yellow drainage from the nose or into the back of the throat (post-nasal drip). You have been given antibiotics to treat this condition.  Home care:    Take the full course of antibiotics as instructed. Do not stop taking them, even if you feel better.    Drink plenty of water, hot tea, and other liquids. This may help thin mucus. It also may promote sinus drainage.    Heat may help soothe painful areas of the face. Use a towel soaked in hot water. Or,  the shower and direct the hot spray onto your face. Using a vaporizer along with a menthol rub at night may also help.     An expectorant containing guaifenesin may help thin the mucus and promote drainage from the sinuses.    Over-the-counter decongestants may be used unless a similar medicine was prescribed. Nasal sprays work the fastest. Use one that contains phenylephrine or oxymetazoline. First blow the nose gently. Then use the spray. Do not use these medicines more  often than directed on the label or symptoms may get worse. You may also use tablets containing pseudoephedrine. Avoid products that combine ingredients, because side effects may be increased. Read labels. You can also ask the pharmacist for help. (NOTE: Persons with high blood pressure should not use decongestants. They can raise blood pressure.)    Over-the-counter antihistamines may help if allergies contributed to your sinusitis.      Do not use nasal rinses or irrigation during an acute sinus infection, unless told to by your health care provider. Rinsing may spread the infection to other sinuses.    Use acetaminophen or ibuprofen to control pain, unless another pain medicine was prescribed. (If you have chronic liver or kidney disease or ever had a stomach ulcer, talk with your doctor before using these medicines. Aspirin should never be used in anyone under 18 years of age who is ill with a fever. It may cause severe liver damage.)    Don't smoke. This can worsen symptoms.  Follow-up care  Follow up with your healthcare provider or our staff if you are not improving within the next week.  When to seek medical advice  Call your healthcare provider if any of these occur:    Facial pain or headache becoming more severe    Stiff neck    Unusual drowsiness or confusion    Swelling of the forehead or eyelids    Vision problems, including blurred or double vision    Fever of 100.4 F (38 C) or higher, or as directed by your healthcare provider    Seizure    Breathing problems    Symptoms not resolving within 10 days  Date Last Reviewed: 4/13/2015 2000-2017 [employer]. [address] [place] [address], [place] [address]. All rights reserved. This information is not intended as a substitute for professional medical care. Always follow your healthcare professional's instructions.                Follow-ups after your visit        Who to contact     Normal or non-critical lab and imaging results will be  communicated to you by [facility], letter or phone within 4 business days after the clinic has received the results. If you do not hear from us within 7 days, please contact the clinic through [employer] or phone. If you have a critical or abnormal lab result, we will notify you by phone as soon as possible.  Submit refill requests through [employer] or call your pharmacy and they will forward the refill request to us. Please allow 3 business days for your refill to be completed.          If you need to speak with a  for additional information , please call: [phone]           Additional Information About Your Visit        [employer] Information     [employer] gives you secure access to your electronic health record. If you see a primary care provider, you can also send messages to your care team and make appointments. If you have questions, please call your primary care clinic.  If you do not have a primary care provider, please call [phone] and they will assist you.        Care EveryWhere ID     This is your Care EveryWhere ID. This could be used by other organizations to access your [place] medical records  [id number]        Your Vitals Were     Pulse Temperature Last Period Pulse Oximetry BMI (Body Mass Index)       88 98.5  F (36.9  C) (Tympanic) 12/06/2017 98% 45.71 kg/m2        Blood Pressure from Last 3 Encounters:   12/08/17 120/74   03/30/17 118/76   03/08/17 126/70    Weight from Last 3 Encounters:   12/08/17 264 lb 3.2 oz (119.8 kg)   03/30/17 254 lb 12.8 oz (115.6 kg)   03/08/17 254 lb 12.8 oz (115.6 kg)              Today, you had the following     No orders found for display         Today's Medication Changes          These changes are accurate as of: 12/8/17  4:07 PM.  If you have any questions, ask your nurse or doctor.               Start taking these medicines.        Dose/Directions    cefdinir 300 MG capsule   Commonly known as:  OMNICEF   Used for:  Acute non-recurrent maxillary  sinusitis   Started by:  Julissa Garza APRN CNP        Dose:  300 mg   Take 1 capsule (300 mg) by mouth 2 times daily   Quantity:  20 capsule   Refills:  0            Where to get your medicines      These medications were sent to [facility] [address] [facility] [place] - [address]  [address] [facility] [address]     Phone:  [phone]     cefdinir 300 MG capsule                Primary Care Provider Office Phone # Fax #    Birgit Curtis [phone] [address] [facility] [facility]   [place] [address]        Equal Access to Services     [facility]: Hadii aad ku hadasho Soomaali, waaxda luqadaha, qaybta kaalmada adeegyada, lindsay salazar haykatarzynan aderosaura motnaño . So [facility] [phone].    ATENCIÓN: Si habla español, tiene a shin disposición servicios gratuitos de asistencia lingüística. Llame al [phone].    We comply with applicable federal civil rights laws and [place] laws. We do not discriminate on the basis of race, color, national origin, age, disability, sex, sexual orientation, or gender identity.            Thank you!     Thank you for choosing [facility]  for your care. Our goal is always to provide you with excellent care. Hearing back from our patients is one way we can continue to improve our services. Please take a few minutes to complete the written survey that you may receive in the mail after your visit with us. Thank you!             Your Updated Medication List - Protect others around you: Learn how to safely use, store and throw away your medicines at www.disposemymeds.org.          This list is accurate as of: 12/8/17  4:07 PM.  Always use your most recent med list.                   Brand Name Dispense Instructions for use Diagnosis    cefdinir 300 MG capsule    OMNICEF    20 capsule    Take 1 capsule (300 mg) by mouth 2 times daily    Acute non-recurrent maxillary sinusitis       levothyroxine 25 MCG tablet    SYNTHROID/LEVOTHROID    90  tablet    Take 1 tablet (25 mcg) by mouth daily    Acquired hypothyroidism

## 2017-12-08 NOTE — PROGRESS NOTES
SUBJECTIVE:   Jennifer Valentin is a 37 year old female who presents to clinic today for the following health issues:      ENT Symptoms             Symptoms: cc Present Absent Comment   Fever/Chills   x    Fatigue  x     Muscle Aches   x    Eye Irritation   x    Sneezing  x     Nasal Yanick/Drg  x     Sinus Pressure/Pain x x  Worse on right side   Loss of smell   x    Dental pain   x    Sore Throat   x    Swollen Glands   x    Ear Pain/Fullness x x  Right ear   Cough  x  occasional   Wheeze   x    Chest Pain   x    Shortness of breath   x    Rash   x    Other  x  Nausea, headaches     Symptom duration:  5 days   Symptom severity:  modertae   Treatments tried:  Tylenol Cold Medication   Contacts:  none       Has not been feeling well for the last 5 days. NNo fevers at home that is aware of. Is having jaw pain, does not feel like is teeth. Is having pain to side of face. Taking over the counter  And does help some. Right ear is hurting.     Problem list and histories reviewed & adjusted, as indicated.  Additional history: as documented    Current Outpatient Prescriptions   Medication Sig Dispense Refill     levothyroxine (SYNTHROID/LEVOTHROID) 25 MCG tablet Take 1 tablet (25 mcg) by mouth daily 90 tablet 3     azithromycin (ZITHROMAX) 250 MG tablet Two tablets first day, then one tablet daily for four days. 6 tablet 0     Allergies   Allergen Reactions     Nka [No Known Allergies]        Reviewed and updated as needed this visit by clinical staffTobacco  Allergies  Meds  Med Hx  Surg Hx  Fam Hx  Soc Hx      Reviewed and updated as needed this visit by Provider         ROS:  Review of Systems   Constitutional: Positive for fatigue. Negative for chills, diaphoresis and fever.   HENT: Positive for congestion, ear pain, sinus pain and sneezing. Negative for ear discharge, hearing loss, rhinorrhea, sinus pressure and sore throat.    Eyes: Negative for discharge and itching.   Respiratory: Positive for cough (dry).  Negative for shortness of breath and wheezing.    Gastrointestinal: Positive for nausea. Negative for constipation and diarrhea.   Skin: Negative for rash.   Neurological: Positive for headaches. Negative for dizziness and light-headedness.         OBJECTIVE:     /74 (BP Location: Right arm, Patient Position: Sitting, Cuff Size: Adult Large)  Pulse 88  Temp 98.5  F (36.9  C) (Tympanic)  Wt 264 lb 3.2 oz (119.8 kg)  LMP 12/06/2017  SpO2 98%  BMI 45.71 kg/m2  Body mass index is 45.71 kg/(m^2).  Physical Exam   Constitutional: She appears well-developed.   HENT:   Right Ear: Tympanic membrane and external ear normal.   Left Ear: Tympanic membrane and external ear normal.   Nose: No mucosal edema or rhinorrhea. Right sinus exhibits maxillary sinus tenderness. Left sinus exhibits maxillary sinus tenderness.   Cardiovascular: Normal rate, regular rhythm and normal heart sounds.    Pulmonary/Chest: Effort normal and breath sounds normal.   Abdominal: Soft. Bowel sounds are normal.   Neurological: She is alert.   Skin: Skin is warm and dry.   Psychiatric: She has a normal mood and affect.       ASSESSMENT/PLAN:   1. Acute non-recurrent maxillary sinusitis  Reviewed treatment plan and role of antibiotics  Instructed to call or return for :  --symptoms not improved in 3 days  --Worsening fever or headaches  --new, unexplained symptoms develop            [facility]

## 2017-12-11 DIAGNOSIS — J01.00 ACUTE NON-RECURRENT MAXILLARY SINUSITIS: Primary | ICD-10-CM

## 2017-12-11 RX ORDER — AZITHROMYCIN 250 MG/1
TABLET, FILM COATED ORAL
Qty: 6 TABLET | Refills: 0 | Status: SHIPPED | OUTPATIENT
Start: 2017-12-11 | End: 2018-02-05

## 2017-12-11 ASSESSMENT — ENCOUNTER SYMPTOMS
CONSTIPATION: 0
SINUS PAIN: 1
DIARRHEA: 0
DIAPHORESIS: 0
SORE THROAT: 0
CHILLS: 0
LIGHT-HEADEDNESS: 0
HEADACHES: 1
WHEEZING: 0
NAUSEA: 1
EYE ITCHING: 0
FATIGUE: 1
SHORTNESS OF BREATH: 0
SINUS PRESSURE: 0
EYE DISCHARGE: 0
FEVER: 0
RHINORRHEA: 0
DIZZINESS: 0

## 2017-12-13 DIAGNOSIS — B37.31 CANDIDIASIS OF VAGINA: Primary | ICD-10-CM

## 2017-12-13 RX ORDER — FLUCONAZOLE 150 MG/1
TABLET ORAL
Qty: 2 TABLET | Refills: 0 | Status: SHIPPED | OUTPATIENT
Start: 2017-12-13 | End: 2018-02-05

## 2017-12-18 DIAGNOSIS — B37.31 CANDIDIASIS OF VAGINA: Primary | ICD-10-CM

## 2017-12-18 RX ORDER — FLUCONAZOLE 150 MG/1
150 TABLET ORAL ONCE
Qty: 1 TABLET | Refills: 0 | Status: SHIPPED | OUTPATIENT
Start: 2017-12-18 | End: 2017-12-18

## 2018-02-05 ENCOUNTER — OFFICE VISIT (OUTPATIENT)
Dept: FAMILY MEDICINE | Facility: CLINIC | Age: 38
End: 2018-02-05
Payer: COMMERCIAL

## 2018-02-05 VITALS
TEMPERATURE: 96.4 F | DIASTOLIC BLOOD PRESSURE: 84 MMHG | HEIGHT: 65 IN | WEIGHT: 263.6 LBS | SYSTOLIC BLOOD PRESSURE: 128 MMHG | BODY MASS INDEX: 43.92 KG/M2 | HEART RATE: 84 BPM

## 2018-02-05 DIAGNOSIS — R00.2 PALPITATIONS: ICD-10-CM

## 2018-02-05 DIAGNOSIS — E03.8 SUBCLINICAL HYPOTHYROIDISM: ICD-10-CM

## 2018-02-05 DIAGNOSIS — Z00.00 ROUTINE GENERAL MEDICAL EXAMINATION AT A HEALTH CARE FACILITY: Primary | ICD-10-CM

## 2018-02-05 DIAGNOSIS — R79.89 ELEVATED LFTS: ICD-10-CM

## 2018-02-05 DIAGNOSIS — E03.9 ACQUIRED HYPOTHYROIDISM: ICD-10-CM

## 2018-02-05 DIAGNOSIS — R03.0 ELEVATED BLOOD PRESSURE READING WITHOUT DIAGNOSIS OF HYPERTENSION: ICD-10-CM

## 2018-02-05 LAB
ALT SERPL W P-5'-P-CCNC: 28 U/L (ref 0–50)
AST SERPL W P-5'-P-CCNC: 23 U/L (ref 0–45)
CREAT UR-MCNC: 80 MG/DL
MICROALBUMIN UR-MCNC: <5 MG/L
MICROALBUMIN/CREAT UR: NORMAL MG/G CR (ref 0–25)
TSH SERPL DL<=0.005 MIU/L-ACNC: 3.18 MU/L (ref 0.4–4)

## 2018-02-05 PROCEDURE — 82043 UR ALBUMIN QUANTITATIVE: CPT | Performed by: NURSE PRACTITIONER

## 2018-02-05 PROCEDURE — 84443 ASSAY THYROID STIM HORMONE: CPT | Performed by: NURSE PRACTITIONER

## 2018-02-05 PROCEDURE — 36415 COLL VENOUS BLD VENIPUNCTURE: CPT | Performed by: NURSE PRACTITIONER

## 2018-02-05 PROCEDURE — 99395 PREV VISIT EST AGE 18-39: CPT | Performed by: NURSE PRACTITIONER

## 2018-02-05 PROCEDURE — 84460 ALANINE AMINO (ALT) (SGPT): CPT | Performed by: NURSE PRACTITIONER

## 2018-02-05 PROCEDURE — 84450 TRANSFERASE (AST) (SGOT): CPT | Performed by: NURSE PRACTITIONER

## 2018-02-05 RX ORDER — LEVOTHYROXINE SODIUM 25 UG/1
25 TABLET ORAL DAILY
Qty: 90 TABLET | Refills: 3 | Status: SHIPPED | OUTPATIENT
Start: 2018-02-05 | End: 2019-01-25

## 2018-02-05 ASSESSMENT — ENCOUNTER SYMPTOMS
ABDOMINAL PAIN: 0
DIZZINESS: 0
SORE THROAT: 0
ACTIVITY CHANGE: 0
NERVOUS/ANXIOUS: 0
WHEEZING: 0
ARTHRALGIAS: 0
VOMITING: 0
ABDOMINAL DISTENTION: 0
DYSPHORIC MOOD: 0
COUGH: 0
HEADACHES: 1
DIARRHEA: 0
SLEEP DISTURBANCE: 0
RHINORRHEA: 0
POLYPHAGIA: 0
PALPITATIONS: 1
NUMBNESS: 0
SHORTNESS OF BREATH: 0
CONSTIPATION: 0
POLYDIPSIA: 0
FREQUENCY: 0
NAUSEA: 0
CHEST TIGHTNESS: 0
LIGHT-HEADEDNESS: 0
DIFFICULTY URINATING: 0
FATIGUE: 0

## 2018-02-05 NOTE — PROGRESS NOTES
SUBJECTIVE:   CC: Jennifer Valentin is an 37 year old woman who presents for preventive health visit.     Answers for HPI/ROS submitted by the patient on 1/29/2018   Annual Exam:  Getting at least 3 servings of Calcium per day:: Yes  Bi-annual eye exam:: Yes  Dental care twice a year:: Yes  Sleep apnea or symptoms of sleep apnea:: None  Diet:: Regular (no restrictions)  Frequency of exercise:: 4-5 days/week  Taking medications regularly:: Yes  Medication side effects:: None  Additional concerns today:: No  PHQ-2 Score: 0  Duration of exercise:: 30-45 minutes    Donates plasma twice per week. Pulse and blood pressure have been high since being sick last month. Her donations have been deferred because of this.     Today's PHQ-2 Score:   PHQ-2 ( 1999 [employer]) 1/29/2018 2/10/2017   Q1: Little interest or pleasure in doing things 0 0   Q2: Feeling down, depressed or hopeless 0 0   PHQ-2 Score 0 0   Q1: Little interest or pleasure in doing things Not at all -   Q2: Feeling down, depressed or hopeless Not at all -   PHQ-2 Score 0 -       Abuse: Current or Past(Physical, Sexual or Emotional)- No  Do you feel safe in your environment - Yes    Social History   Substance Use Topics     Smoking status: Never Smoker     Smokeless tobacco: Never Used     Alcohol use No     If you drink alcohol do you typically have >3 drinks per day or >7 drinks per week? No                     Reviewed orders with patient.  Reviewed health maintenance and updated orders accordingly - Yes  Current Outpatient Prescriptions   Medication Sig Dispense Refill     levothyroxine (SYNTHROID/LEVOTHROID) 25 MCG tablet Take 1 tablet (25 mcg) by mouth daily 90 tablet 3     Allergies   Allergen Reactions     Nka [No Known Allergies]        Mammogram not appropriate for this patient based on age.    Pertinent mammograms are reviewed under the imaging tab.  History of abnormal Pap smear: NO - age 30-65 PAP every 5 years with negative HPV co-testing  recommended    Reviewed and updated as needed this visit by clinical staff  Tobacco  Allergies  Meds  Med Hx  Surg Hx  Fam Hx  Soc Hx        Reviewed and updated as needed this visit by Provider        Last Pap: 2017-normal. Never an abnormal pap.   Last mammogram: Never. No family history of breast cancer.   Last BMD: Never   Last Colonoscopy: 8/14 due to diarrhea-normal. No family history of colon cancer  Last eye exam: Last month   Last dental exam: last month  Last tetanus vaccine: 4/10  Last influenza vaccine: 10/17  Last shingles vaccine: Never   Last pneumonia vaccine: Never     Has fluttering in chest that comes randomly. Will last a few seconds and will get dizzy and lightheaded at times.  No sweating, no shortness of breath.  No pain in chest.  Will be there and then will be gone.  Occurs 1-2 times per month.  Does check blood pressure out in the community, usually gets around 155 for the top number. Has been having more headaches. No increased tiredness or fatigued. No increased anxiety or irritability. No family history of high blood pressure. Uses non salt seasoned, usually cooks own food. Has been trying to be more active and is working out.  Is sexually active, partner has had vasectomy.  No concern for sexually transmitted diseases.  Donates plasma.  Very occasionally drinks alcohol, takes Tylenol infrequently for headaches    ROS:  Review of Systems   Constitutional: Negative for activity change and fatigue.   HENT: Negative for ear pain, rhinorrhea and sore throat.    Respiratory: Negative for cough, chest tightness, shortness of breath and wheezing.    Cardiovascular: Positive for palpitations (occ). Negative for chest pain and leg swelling.   Gastrointestinal: Negative for abdominal distention, abdominal pain, constipation, diarrhea, nausea and vomiting.   Endocrine: Negative for cold intolerance, heat intolerance, polydipsia, polyphagia and polyuria.   Genitourinary: Negative for  "difficulty urinating, enuresis, frequency and urgency.   Musculoskeletal: Negative for arthralgias.   Skin: Negative for rash.   Neurological: Positive for headaches. Negative for dizziness, light-headedness and numbness.   Psychiatric/Behavioral: Negative for dysphoric mood and sleep disturbance. The patient is not nervous/anxious.          OBJECTIVE:   BP (!) 142/92 (BP Location: Left arm, Patient Position: Sitting, Cuff Size: Adult Large)  Pulse 84  Temp 96.4  F (35.8  C) (Tympanic)  Ht 5' 4.5\" (1.638 m)  Wt 263 lb 9.6 oz (119.6 kg)  LMP 01/27/2018  BMI 44.55 kg/m2  EXAM:  Physical Exam   Constitutional: She appears well-developed and well-nourished.   HENT:   Head: Normocephalic and atraumatic.   Right Ear: Tympanic membrane and external ear normal. No middle ear effusion.   Left Ear: Tympanic membrane and external ear normal.  No middle ear effusion.   Nose: No mucosal edema.   Mouth/Throat: Uvula is midline, oropharynx is clear and moist and mucous membranes are normal.   Eyes: Pupils are equal, round, and reactive to light.   Neck: Normal range of motion. Carotid bruit is not present. No thyromegaly present.   Cardiovascular: Normal rate, regular rhythm and normal heart sounds.    Pulses:       Femoral pulses are 2+ on the right side, and 2+ on the left side.  Pulmonary/Chest: Effort normal and breath sounds normal.   Abdominal: Soft. Normal appearance and bowel sounds are normal. There is no tenderness.   Musculoskeletal: Normal range of motion.   Neurological: She is alert.   Skin: Skin is warm and dry. No rash noted.   Psychiatric: She has a normal mood and affect. Her behavior is normal.       ASSESSMENT/PLAN:   1. Routine general medical examination at a health care facility  Screening guidelines reviewed.     2. Subclinical hypothyroidism  Will send refills of levothyroixine when labs return.   - TSH with free T4 reflex    3. Elevated LFTs  Will set up for US of liver if LFT's remian elevated. " "  Encouraged to continue to avoid Tylenol products and alcohol.  - ALT  - AST    4. Elevated blood pressure reading without diagnosis of hypertension  We will check urine microalbumin.  Did discuss starting low dose ACE if is positive.  Continue to check blood pressure out in the community.    - Albumin Random Urine Quantitative with Creat Ratio    5. Palpitations  Education given.  Encouraged to continue to monitor out in the community.  Educated regarding warning signs to watch for and when would need to seek further medical attention.            COUNSELING:   Reviewed preventive health counseling, as reflected in patient instructions       Regular exercise       Healthy diet/nutrition       Vision screening       Contraception       Safe sex practices/STD prevention    BP Screening:   Last 3 BP Readings:    BP Readings from Last 3 Encounters:   02/05/18 (!) 142/92   12/08/17 120/74   03/30/17 118/76       The following was recommended to the patient:  Re-screen BP within a year and recommended lifestyle modifications     reports that she has never smoked. She has never used smokeless tobacco.    Estimated body mass index is 44.55 kg/(m^2) as calculated from the following:    Height as of this encounter: 5' 4.5\" (1.638 m).    Weight as of this encounter: 263 lb 9.6 oz (119.6 kg).   Weight management plan: Discussed healthy diet and exercise guidelines and patient will follow up in 12 months in clinic to re-evaluate.    Counseling Resources:  ATP IV Guidelines  Pooled Cohorts Equation Calculator  Breast Cancer Risk Calculator  FRAX Risk Assessment  ICSI Preventive Guidelines  Dietary Guidelines for Americans, 2010  USDA's MyPlate  ASA Prophylaxis  Lung CA Screening    [facility]    "

## 2018-02-05 NOTE — MR AVS SNAPSHOT
After Visit Summary   2/5/2018    Jennifer Valentin    MRN: [id number]           Patient Information     Date Of Birth          1980        Visit Information        Provider Department      2/5/2018 8:20 AM Julissa Garza APRN [facility]        Today's Diagnoses     Routine general medical examination at a health care facility    -  1    Subclinical hypothyroidism        Elevated LFTs        Elevated blood pressure reading without diagnosis of hypertension          Care Instructions      Preventive Health Recommendations  Female Ages 26 - 39  Yearly exam:   See your health care provider every year in order to    Review health changes.     Discuss preventive care.      Review your medicines if you your doctor has prescribed any.    Until age 30: Get a Pap test every three years (more often if you have had an abnormal result).    After age 30: Talk to your doctor about whether you should have a Pap test every 3 years or have a Pap test with HPV screening every 5 years.   You do not need a Pap test if your uterus was removed (hysterectomy) and you have not had cancer.  You should be tested each year for STDs (sexually transmitted diseases), if you're at risk.   Talk to your provider about how often to have your cholesterol checked.  If you are at risk for diabetes, you should have a diabetes test (fasting glucose).  Shots: Get a flu shot each year. Get a tetanus shot every 10 years.   Nutrition:     Eat at least 5 servings of fruits and vegetables each day.    Eat whole-grain bread, whole-wheat pasta and brown rice instead of white grains and rice.    Talk to your provider about Calcium and Vitamin D.     Lifestyle    Exercise at least 150 minutes a week (30 minutes a day, 5 days of the week). This will help you control your weight and prevent disease.    Limit alcohol to one drink per day.    No smoking.     Wear sunscreen to prevent skin cancer.    See your dentist  "every six months for an exam and cleaning.            Follow-ups after your visit        Who to contact     Normal or non-critical lab and imaging results will be communicated to you by [employer], letter or phone within 4 business days after the clinic has received the results. If you do not hear from us within 7 days, please contact the clinic through [employer] or phone. If you have a critical or abnormal lab result, we will notify you by phone as soon as possible.  Submit refill requests through [employer] or call your pharmacy and they will forward the refill request to us. Please allow 3 business days for your refill to be completed.          If you need to speak with a  for additional information , please call: [phone]           Additional Information About Your Visit        [employer] Information     [employer] gives you secure access to your electronic health record. If you see a primary care provider, you can also send messages to your care team and make appointments. If you have questions, please call your primary care clinic.  If you do not have a primary care provider, please call [phone] and they will assist you.        Care EveryWhere ID     This is your Care EveryWhere ID. This could be used by other organizations to access your [facility] records  [id number]        Your Vitals Were     Pulse Temperature Height Last Period BMI (Body Mass Index)       84 96.4  F (35.8  C) (Tympanic) 5' 4.5\" (1.638 m) 01/27/2018 44.55 kg/m2        Blood Pressure from Last 3 Encounters:   02/05/18 (!) 142/92   12/08/17 120/74   03/30/17 118/76    Weight from Last 3 Encounters:   02/05/18 263 lb 9.6 oz (119.6 kg)   12/08/17 264 lb 3.2 oz (119.8 kg)   03/30/17 254 lb 12.8 oz (115.6 kg)              We Performed the Following     Albumin Random Urine Quantitative with Creat Ratio     ALT     AST     TSH with free T4 reflex        Primary Care Provider Office Phone # Fax #    Birgit Curtis MD " [phone]       [address] [place] [address]        Equal Access to Services     LEROY PEARL : Nelda Yao, warogerda karlymatildeha, jose de jesusaugusta manninghaileyda ericdanilojuliocesar, waxhao rein hayaaleticia mcfarlanerosaura reecemadiha sewell. So [facility] [phone].    ATENCIÓN: Si habla español, tiene a shin disposición servicios gratuitos de asistencia lingüística. Llame al [phone].    We comply with applicable federal civil rights laws and [place] laws. We do not discriminate on the basis of race, color, national origin, age, disability, sex, sexual orientation, or gender identity.            Thank you!     Thank you for choosing [facility]  for your care. Our goal is always to provide you with excellent care. Hearing back from our patients is one way we can continue to improve our services. Please take a few minutes to complete the written survey that you may receive in the mail after your visit with us. Thank you!             Your Updated Medication List - Protect others around you: Learn how to safely use, store and throw away your medicines at www.disposemymeds.org.          This list is accurate as of 2/5/18  8:53 AM.  Always use your most recent med list.                   Brand Name Dispense Instructions for use Diagnosis    levothyroxine 25 MCG tablet    SYNTHROID/LEVOTHROID    90 tablet    Take 1 tablet (25 mcg) by mouth daily    Acquired hypothyroidism

## 2018-02-05 NOTE — NURSING NOTE
"Chief Complaint   Patient presents with     Physical       Initial BP (!) 142/92 (BP Location: Left arm, Patient Position: Sitting, Cuff Size: Adult Large)  Pulse 84  Temp 96.4  F (35.8  C) (Tympanic)  Ht 5' 4.5\" (1.638 m)  Wt 263 lb 9.6 oz (119.6 kg)  LMP 01/27/2018  BMI 44.55 kg/m2 Estimated body mass index is 44.55 kg/(m^2) as calculated from the following:    Height as of this encounter: 5' 4.5\" (1.638 m).    Weight as of this encounter: 263 lb 9.6 oz (119.6 kg).  Medication Reconciliation: complete     April TAYA Andrews      "

## 2019-01-07 ENCOUNTER — OFFICE VISIT (OUTPATIENT)
Dept: FAMILY MEDICINE | Facility: CLINIC | Age: 39
End: 2019-01-07
Payer: COMMERCIAL

## 2019-01-07 VITALS
TEMPERATURE: 98.3 F | DIASTOLIC BLOOD PRESSURE: 78 MMHG | WEIGHT: 267.4 LBS | SYSTOLIC BLOOD PRESSURE: 110 MMHG | RESPIRATION RATE: 16 BRPM | HEIGHT: 64 IN | BODY MASS INDEX: 45.65 KG/M2 | HEART RATE: 84 BPM

## 2019-01-07 DIAGNOSIS — K13.0 RASH ON LIPS: Primary | ICD-10-CM

## 2019-01-07 PROCEDURE — 99213 OFFICE O/P EST LOW 20 MIN: CPT | Performed by: NURSE PRACTITIONER

## 2019-01-07 RX ORDER — TRIAMCINOLONE ACETONIDE 1 MG/G
CREAM TOPICAL
Qty: 15 G | Refills: 0 | Status: SHIPPED | OUTPATIENT
Start: 2019-01-07 | End: 2019-09-27

## 2019-01-07 ASSESSMENT — ENCOUNTER SYMPTOMS
LIGHT-HEADEDNESS: 0
HEADACHES: 0
FATIGUE: 0
EYE ITCHING: 0
SINUS PRESSURE: 0
WHEEZING: 0
DIZZINESS: 0
COUGH: 0
EYE DISCHARGE: 0
CHILLS: 0
FEVER: 0
DIARRHEA: 0
SHORTNESS OF BREATH: 0
NAUSEA: 0
RHINORRHEA: 0
SORE THROAT: 0
DIAPHORESIS: 0
CONSTIPATION: 0

## 2019-01-07 ASSESSMENT — PAIN SCALES - GENERAL: PAINLEVEL: NO PAIN (0)

## 2019-01-07 ASSESSMENT — MIFFLIN-ST. JEOR: SCORE: 1881.89

## 2019-01-07 NOTE — PROGRESS NOTES
SUBJECTIVE:   Jennifer Valentin is a 38 year old female who presents to clinic today for the following health issues:      Rash      Duration: 3 weeks    Description  Location: lower lip  Itching: no    Intensity:  mild    Accompanying signs and symptoms: feels dry    History (similar episodes/previous evaluation): None    Precipitating or alleviating factors:  New exposures:  None  Recent travel: no      Therapies tried and outcome: Neosporin, Chapstick      Problem list and histories reviewed & adjusted, as indicated.  Additional history: as documented    Current Outpatient Medications   Medication Sig Dispense Refill     levothyroxine (SYNTHROID/LEVOTHROID) 25 MCG tablet Take 1 tablet (25 mcg) by mouth daily 90 tablet 3     triamcinolone (KENALOG) 0.1 % external cream Apply to affected area daily for the next 7 days. 15 g 0     Allergies   Allergen Reactions     Nka [No Known Allergies]        Reviewed and updated as needed this visit by clinical staff  Tobacco  Allergies  Meds  Med Hx  Surg Hx  Fam Hx  Soc Hx      Reviewed and updated as needed this visit by Provider        Rash to lower lip for the last 2-3 weeks. Does not itch. Has been using chapstick and also neosporin and that is not helping. Has been using eye cream and that is not helping. No one else in home has the rash. No new products.     ROS:  Review of Systems   Constitutional: Negative for chills, diaphoresis, fatigue and fever.   HENT: Negative for ear discharge, ear pain, hearing loss, rhinorrhea, sinus pressure and sore throat.    Eyes: Negative for discharge and itching.   Respiratory: Negative for cough, shortness of breath and wheezing.    Gastrointestinal: Negative for constipation, diarrhea and nausea.   Skin: Positive for rash (lower lip, no itching ).   Neurological: Negative for dizziness, light-headedness and headaches.         OBJECTIVE:     /78 (BP Location: Right arm, Patient Position: Sitting, Cuff Size: Adult Large)   " Pulse 84   Temp 98.3  F (36.8  C) (Tympanic)   Resp 16   Ht 1.632 m (5' 4.25\")   Wt 121.3 kg (267 lb 6.4 oz)   LMP 01/02/2019   BMI 45.54 kg/m    Body mass index is 45.54 kg/m .  Physical Exam   Constitutional: She appears well-developed.   HENT:   Head: Normocephalic.   Nose:       Cardiovascular: Normal rate, regular rhythm and normal heart sounds.   Pulmonary/Chest: Effort normal and breath sounds normal.   Neurological: She is alert.   Skin: Skin is warm.       ASSESSMENT/PLAN:   1. Rash on lips  Educated on use of cream.  Notify if no improvement over the next week and will refer to dermatology.  - triamcinolone (KENALOG) 0.1 % external cream; Apply to affected area daily for the next 7 days.  Dispense: 15 g; Refill: 0        [facility]      "

## 2019-01-25 ENCOUNTER — OFFICE VISIT (OUTPATIENT)
Dept: FAMILY MEDICINE | Facility: CLINIC | Age: 39
End: 2019-01-25
Payer: COMMERCIAL

## 2019-01-25 VITALS
HEIGHT: 64 IN | SYSTOLIC BLOOD PRESSURE: 126 MMHG | WEIGHT: 268.38 LBS | DIASTOLIC BLOOD PRESSURE: 80 MMHG | BODY MASS INDEX: 45.82 KG/M2 | HEART RATE: 74 BPM

## 2019-01-25 DIAGNOSIS — E03.9 ACQUIRED HYPOTHYROIDISM: Primary | ICD-10-CM

## 2019-01-25 DIAGNOSIS — E66.01 MORBID OBESITY (H): ICD-10-CM

## 2019-01-25 LAB — TSH SERPL DL<=0.005 MIU/L-ACNC: 3.1 MU/L (ref 0.4–4)

## 2019-01-25 PROCEDURE — 36415 COLL VENOUS BLD VENIPUNCTURE: CPT | Performed by: FAMILY MEDICINE

## 2019-01-25 PROCEDURE — 99213 OFFICE O/P EST LOW 20 MIN: CPT | Performed by: FAMILY MEDICINE

## 2019-01-25 PROCEDURE — 84443 ASSAY THYROID STIM HORMONE: CPT | Performed by: FAMILY MEDICINE

## 2019-01-25 RX ORDER — LEVOTHYROXINE SODIUM 25 UG/1
25 TABLET ORAL DAILY
Qty: 90 TABLET | Refills: 3 | Status: SHIPPED | OUTPATIENT
Start: 2019-01-25 | End: 2020-04-02

## 2019-01-25 ASSESSMENT — MIFFLIN-ST. JEOR: SCORE: 1886.31

## 2019-01-25 NOTE — PROGRESS NOTES
"  SUBJECTIVE:   Jennifer Valentin is a 38 year old female who presents to clinic today for the following health issues:      Hypothyroidism Follow-up  Levothyroxine 25mcg qd    Since last visit, patient describes the following symptoms: Weight stable, no hair loss, no skin changes, no constipation, no loose stools      Amount of exercise or physical activity: 2-3 days/week    Problems taking medications regularly: No    Medication side effects: none    Diet: watching what she eats      Problem list and histories reviewed & adjusted, as indicated.  Additional history: as documented    Labs reviewed in EPIC    Reviewed and updated as needed this visit by clinical staff  Tobacco  Allergies  Meds  Med Hx  Surg Hx  Fam Hx  Soc Hx      Reviewed and updated as needed this visit by Provider         ROS:  CONSTITUTIONAL: NEGATIVE for fever, chills, change in weight  INTEGUMENTARY/SKIN: NEGATIVE for worrisome rashes, moles or lesions  ENT/MOUTH: NEGATIVE for ear, mouth and throat problems  RESP: NEGATIVE for significant cough or SOB  CV: NEGATIVE for chest pain, palpitations or peripheral edema  PSYCHIATRIC: NEGATIVE for changes in mood or affect    OBJECTIVE:                                                    /80   Pulse 74   Ht 1.632 m (5' 4.25\")   Wt 121.7 kg (268 lb 6 oz)   LMP 01/02/2019   BMI 45.71 kg/m    Body mass index is 45.71 kg/m .   GENERAL: healthy, alert, well nourished, well hydrated, no distress  Thyroid: smooth, without nodules, not enlarged.   RESP: lungs clear to auscultation - no rales, no rhonchi, no wheezes  CV: regular rates and rhythm, normal S1 S2  PSYCH: mentation appears normal, affect normal/bright  Results for orders placed or performed in visit on 01/25/19   TSH   Result Value Ref Range    TSH 3.10 0.40 - 4.00 mU/L         ASSESSMENT/PLAN:                                                      (E03.9) Acquired hypothyroidism  (primary encounter diagnosis)  Comment: TSH within the " normal range.  Plan: levothyroxine (SYNTHROID/LEVOTHROID) 25 MCG         tablet, TSH, CANCELED: TSH, CANCELED: TSH        Continue.  1 year refill.    (E66.01) Morbid obesity (H)  Comment: Reviewed lifestyle.  Plan: Recheck BMI yearly.      Patient Instructions   *   Will do blood tests for thyroid function.     *   Yearly check up.         Birgit Curtis MD  [facility]

## 2019-01-31 PROBLEM — E66.01 MORBID OBESITY (H): Status: ACTIVE | Noted: 2019-01-31

## 2019-04-26 DIAGNOSIS — Z01.84 IMMUNITY STATUS TESTING: Primary | ICD-10-CM

## 2019-05-02 DIAGNOSIS — Z01.84 IMMUNITY STATUS TESTING: ICD-10-CM

## 2019-05-02 PROCEDURE — 86735 MUMPS ANTIBODY: CPT | Performed by: NURSE PRACTITIONER

## 2019-05-02 PROCEDURE — 86765 RUBEOLA ANTIBODY: CPT | Performed by: NURSE PRACTITIONER

## 2019-05-02 PROCEDURE — 86762 RUBELLA ANTIBODY: CPT | Performed by: NURSE PRACTITIONER

## 2019-05-02 PROCEDURE — 36415 COLL VENOUS BLD VENIPUNCTURE: CPT | Performed by: NURSE PRACTITIONER

## 2019-05-03 LAB
MEV IGG SER QL IA: >8 AI (ref 0–0.8)
MUV IGG SER QL IA: 4.9 AI (ref 0–0.8)
RUBV IGG SERPL IA-ACNC: 59 IU/ML

## 2019-06-04 ENCOUNTER — OFFICE VISIT (OUTPATIENT)
Dept: FAMILY MEDICINE | Facility: CLINIC | Age: 39
End: 2019-06-04
Payer: COMMERCIAL

## 2019-06-04 VITALS
RESPIRATION RATE: 14 BRPM | TEMPERATURE: 98.4 F | BODY MASS INDEX: 45.84 KG/M2 | HEART RATE: 66 BPM | WEIGHT: 268.5 LBS | DIASTOLIC BLOOD PRESSURE: 76 MMHG | SYSTOLIC BLOOD PRESSURE: 128 MMHG | HEIGHT: 64 IN

## 2019-06-04 DIAGNOSIS — S63.501S RIGHT WRIST SPRAIN, SEQUELA: Primary | ICD-10-CM

## 2019-06-04 DIAGNOSIS — S00.33XD CONTUSION OF NOSE, SUBSEQUENT ENCOUNTER: ICD-10-CM

## 2019-06-04 PROCEDURE — 99213 OFFICE O/P EST LOW 20 MIN: CPT | Performed by: FAMILY MEDICINE

## 2019-06-04 ASSESSMENT — ENCOUNTER SYMPTOMS
BREAST MASS: 0
NERVOUS/ANXIOUS: 0
DIZZINESS: 0
HEMATURIA: 0
CONSTIPATION: 0
ABDOMINAL PAIN: 0
MYALGIAS: 0
SHORTNESS OF BREATH: 0
PALPITATIONS: 0
HEADACHES: 0
HEARTBURN: 0
NAUSEA: 0
FEVER: 0
CHILLS: 0
COUGH: 0
ARTHRALGIAS: 1
SINUS PAIN: 1
DIARRHEA: 0
WEAKNESS: 0
FREQUENCY: 0
DYSURIA: 0
EYE PAIN: 0
PARESTHESIAS: 0
JOINT SWELLING: 0
HEMATOCHEZIA: 0
SORE THROAT: 0

## 2019-06-04 ASSESSMENT — MIFFLIN-ST. JEOR: SCORE: 1886.88

## 2019-06-04 ASSESSMENT — PAIN SCALES - GENERAL: PAINLEVEL: MILD PAIN (3)

## 2019-06-04 NOTE — PATIENT INSTRUCTIONS
Pietro Valentin,    Thank you for allowing [facility] to manage your care.    I made an occupational therapy referral, please call to scheduled/they will be in 1-2 weeks to set up your appointment.  If you do not hear from them, please call the specialty number on your after visit.     Encourage rest, ice, compression, and elevation.    May take ibuprofen 600 mg every 8 hours as needed pain (with food).     If you have any questions or concerns, please feel free to call us at [phone].    Sincerely,    Dr. Rodriguez

## 2019-06-04 NOTE — LETTER
June 4, 2019      Jennifer Valentin  [address] [address] [facility] [address] [address]        To Whom It May Concern:    Jennifer Valentin was seen in our clinic.  I recommend that she is on light duty with the following light duty restrictions for 1 month (7/4/19) due to her wrist injury:    No heavy lifting (greater 5 lbs)  No pulling or pushing  No strenuous activities involving right wrist.     Sincerely,        Dr. Jose J Rodriguez

## 2019-06-04 NOTE — PROGRESS NOTES
Subjective     Jennifer Valentin is a 38 year old female who presents to clinic today for the following health issues:    HPI     - Follow up after 2019 UC visit.    Joint Pain    Onset: 2019    Description:   Location: Right wrist  Character: Dull ache that becomes more sharp is flexing wrist or opening hand    Intensity: moderate    Progression of Symptoms: same    Accompanying Signs & Symptoms:  Other symptoms: Numbness and tingling into fingers if moving hand to open a door, pain does sometimes shoot up to elbow. There is some bruising and swollen. No redness.    History:   Previous similar pain: no       Precipitating factors:   Trauma or overuse: YES- While chasing dog in the yard she tripped landing on face, right wrist and right knee    Alleviating factors:  Improved by: brace    Therapies Tried and outcome: She is wearing brace with some improvement        Patient Active Problem List   Diagnosis     Obesity     Heavy period     CARDIOVASCULAR SCREENING; LDL GOAL LESS THAN 160     Encounter for routine gynecological examination     Contraception -- current partner vasectomy     Family history of thyroid disease     Subclinical hypothyroidism     Elevated LFTs     Morbid obesity (H)     Past Surgical History:   Procedure Laterality Date     C  DELIVERY ONLY       COLONOSCOPY  2014    Procedure: COLONOSCOPY;  Surgeon: Lyndon Stark MD;  Location: [facility]     LAPAROSCOPIC CHOLECYSTECTOMY WITH CHOLANGIOGRAMS  2014    Procedure: LAPAROSCOPIC CHOLECYSTECTOMY WITH CHOLANGIOGRAMS;  Surgeon: Julius Butt MD;  Location: [facility]       Social History     Tobacco Use     Smoking status: Never Smoker     Smokeless tobacco: Never Used   Substance Use Topics     Alcohol use: No     Family History   Problem Relation Age of Onset     Thyroid Disease Mother 30        hypothyroidism.      Anxiety Disorder Mother      Musculoskeletal Disorder Father          in a motorcycle accident , age  20's or early 30's     Genitourinary Problems Son         kidney reflux     C.A.D. No family hx of      Diabetes No family hx of      Hypertension No family hx of      Breast Cancer No family hx of      Cancer - colorectal No family hx of      Prostate Cancer No family hx of      Colon Cancer No family hx of      Coronary Artery Disease No family hx of          Current Outpatient Medications   Medication Sig Dispense Refill     levothyroxine (SYNTHROID/LEVOTHROID) 25 MCG tablet Take 1 tablet (25 mcg) by mouth daily 90 tablet 3     triamcinolone (KENALOG) 0.1 % external cream Apply to affected area daily for the next 7 days. 15 g 0     Allergies   Allergen Reactions     Nka [No Known Allergies]      Reviewed and updated as needed this visit by Provider       1. Right wrist pain: S/P forward fall from chasing dog who got off leash.  Patient on right wrist and facial region.  Went to urgent care on 5/28/19 at [facility].  Had wrist xrays which did not show any acute fracture and dislocation.  Was discharged home wrist splint.  Continues to have nasal pain but able to breath.  Symptoms unchanged.  No bloody nose.  Continues to have pain on 4-5th digits and medial part of wrist.  Hard to hold coffee cup.  Symptoms has improved.  Took tylenol which helped with pain.  Minimal swelling.    Review of Systems   Constitutional: Negative for chills and fever.   HENT: Positive for sinus pain. Negative for congestion, ear pain, hearing loss and sore throat.    Eyes: Negative for pain and visual disturbance.   Respiratory: Negative for cough and shortness of breath.    Cardiovascular: Negative for chest pain, palpitations and peripheral edema.   Gastrointestinal: Negative for abdominal pain, constipation, diarrhea, heartburn, hematochezia and nausea.   Breasts:  Negative for tenderness, breast mass and discharge.   Genitourinary: Negative for dysuria, frequency, genital sores, hematuria, pelvic pain, urgency, vaginal bleeding  "and vaginal discharge.   Musculoskeletal: Positive for arthralgias (right wrist). Negative for joint swelling and myalgias.   Skin: Negative for rash.   Neurological: Negative for dizziness, weakness, headaches and paresthesias.   Psychiatric/Behavioral: Negative for mood changes. The patient is not nervous/anxious.          Objective    /76   Pulse 66   Temp 98.4  F (36.9  C) (Tympanic)   Resp 14   Ht 1.632 m (5' 4.25\")   Wt 121.8 kg (268 lb 8 oz)   BMI 45.73 kg/m    Body mass index is 45.73 kg/m .  Physical Exam   Constitutional: She is oriented to person, place, and time. She appears well-developed and well-nourished. No distress.   HENT:   Head: Normocephalic and atraumatic.   Right Ear: Tympanic membrane normal.   Left Ear: Tympanic membrane normal.   Nose: Nose normal.   Eyes: Conjunctivae are normal. Right eye exhibits no discharge. Left eye exhibits no discharge.   Neck: Normal range of motion. No tracheal deviation present.   Cardiovascular: Normal rate, regular rhythm, S1 normal, S2 normal, normal heart sounds and normal pulses. Exam reveals no S3, no S4 and no friction rub.   No murmur heard.  Pulmonary/Chest: Effort normal and breath sounds normal. No respiratory distress. She has no wheezes. She has no rales.   Musculoskeletal: She exhibits no edema.   Right wrist: normal symmetry, tenderness involving medial aspect of wrist, limited ROM in regards to wrist flexion, NVI, good radial pulse   Neurological: She is alert and oriented to person, place, and time. She has normal strength and normal reflexes. She exhibits normal muscle tone.   Skin: Skin is warm and dry. No rash noted.   Psychiatric: She has a normal mood and affect. Judgment and thought content normal. Cognition and memory are normal.       Assessment & Plan     1. Right wrist sprain, sequela  S/P fall.  Xray results reviewed from [facility]. Continue with wrist splint.  Encourage rest, ice, compression and elevation.  May take " ibuprofen prn.  Light duty for 1 month.   - [facility] REFERRAL; Future    2. Contusion of nose, subsequent encounter  Supportive care for now.     See Patient Instructions    Return in about 1 month (around 7/2/2019), or if symptoms worsen or fail to improve.    Jose J Rodriguez, DO  [facility]

## 2019-06-06 ENCOUNTER — THERAPY VISIT (OUTPATIENT)
Dept: OCCUPATIONAL THERAPY | Facility: CLINIC | Age: 39
End: 2019-06-06
Payer: COMMERCIAL

## 2019-06-06 DIAGNOSIS — M25.531 RIGHT WRIST PAIN: Primary | ICD-10-CM

## 2019-06-06 DIAGNOSIS — S63.501S RIGHT WRIST SPRAIN, SEQUELA: ICD-10-CM

## 2019-06-06 PROCEDURE — 97165 OT EVAL LOW COMPLEX 30 MIN: CPT | Mod: GO | Performed by: OCCUPATIONAL THERAPIST

## 2019-06-06 PROCEDURE — 97110 THERAPEUTIC EXERCISES: CPT | Mod: GO | Performed by: OCCUPATIONAL THERAPIST

## 2019-06-06 PROCEDURE — 97112 NEUROMUSCULAR REEDUCATION: CPT | Mod: GO | Performed by: OCCUPATIONAL THERAPIST

## 2019-06-06 NOTE — PROGRESS NOTES
Hand Therapy Initial Evaluation    Current Date:  2019    Subjective:  Jennifer Valentin is a 38 year old right hand dominant female.    Diagnosis:   Right wrist pain/sprain  DOI:  19    Patient reports symptoms of pain, stiffness/loss of motion, weakness/loss of strength, tingling/numbness and edema of the right wrist which occurred due to trip and fall when chasing dog. Since onset symptoms are gradually getting better.  Special tests:  x-ray negative.  Previous treatment: OTC wrist splint full time day, off sleeping.  General health as reported by patient is good.  Pertinent medical history includes:Overweight, Seizures, Thyroid Problems  Medical allergies:none.  Surgical history: other: , gallbladder removal.  Medication history: Thyroid.    Occupational Profile Information:  Current occupation is Microbiology   Currently working in normal job with restrictions  Job Tasks: Computer Work, Lifting, Carrying, Pushing, Pulling, Repetitive Tasks  Prior functional level:  no limitations  Barriers include:none  Mobility: No difficulty  Transportation: drives    Functional Outcome Measure:  Upper Extremity Functional Index  SCORE:   Column Totals: 49/80  (A lower score indicates greater disability.)    Objective:  Pain Level (Scale 0-10)   2019   At Rest 0-1   With Use 3-6     Pain Description  Date 2019   Location Ulnar wrist   Pain Quality Aching, Dull and Sharp   Frequency intermittent sharp pain, constant dull ache   Pain is worst  daytime   Exacerbated by  Lifting, carrying, weight bearing, gripping, twisting   Relieved by rest and splint   Progression Gradually improving     Sensation  Decreased Ulnar Nerve distribution per pt report intermittently    Edema  Mild dorsal/ulnar wrist      ROM  Shoulder, elbow, thumb and finger ROM WNL's and pain free    Pain Report:  + mild    ++ moderate    +++ severe   Wrist 2019   AROM (PROM) Left Right   Extension  45+   Flexion   0++   RD  20+   UD  10++   Supination  30++   Pronation  75+     Tenderness   Pain Report:  + mild    ++ moderate    +++ severe     6/6/2019   DRUJ +   Dorsal wrist/EDC + slight   Volar radial wrist/scaphoid/FCR -   Radial wrist/1st discontinue/radial styloid -   ECU/dorsal ulnar wrist -   TFCC ++   Volar ulnar wrist/FCU -     Strength   (Measured in pounds)  Pain Report:  + mild    ++ moderate    +++ severe    6/6/2019 6/6/2019   Trials Left Right   1  2  3 87  84  82 34+  24+  34+   Average 84 31     Lat Pinch 6/6/2019 6/6/2019   Trials Left RIght   1  2  3 17  15  19 13  14  13   Average 17 13     3 Pt Pinch 6/6/2019 6/6/2019   Trials Left Right   1  2  3 17  16  17 16+  13+  14+   Average 17 14     Assessment:  Patient presents with symptoms consistent with diagnosis of right wrist pain/sprain, with conservative intervention.     Patient's limitations or Problem List includes:  Pain, Decreased ROM/motion, Increased edema, Weakness, Decreased  and Decreased pinch of the right wrist which interferes with the patient's ability to perform Self Care Tasks (dressing, eating, bathing), Work Tasks, Recreational Activities and Household Chores as compared to previous level of function.    Rehab Potential:  Excellent - Return to full activity, no limitations    Patient will benefit from skilled Occupational Therapy to increase ROM, flexibility, overall strength,  strength, pinch strength, stability of wrist and sensation and decrease pain and edema to return to previous activity level and resume normal daily tasks and to reach their rehab potential.    Barriers to Learning:  No barrier    Communication Issues:  Patient appears to be able to clearly communicate and understand verbal and written communication and follow directions correctly.    Chart Review: Chart Review and Simple history review with patient    Identified Performance Deficits: bathing/showering, dressing, hygiene and grooming, home  establishment and management, meal preparation and cleanup, work and leisure activities    Assessment of Occupational Performance:  5 or more Performance Deficits    Clinical Decision Making (Complexity): Low complexity    Treatment Explanation:  The following has been discussed with the patient:  RX ordered/plan of care  Anticipated outcomes  Possible risks and side effects    Plan:  Frequency:  1 X week, once daily  Duration:  for 6 weeks    Treatment Plan:    Modalities:  US, Fluidotherapy and Paraffin  Therapeutic Exercise:  AROM, AAROM, PROM, Isotonics, Isometrics and Stabilization  Neuromuscular re-education:  Nerve Gliding, Posture and Kinesiotaping  Manual Techniques:  Joint mobilization, Friction massage and Myofascial release  Orthotic Fabrication:  Forearm based orthosis  Self Care:  Self Care Tasks    Discharge Plan:    Achieve all LTG.  Independent in home treatment program.  Reach maximal therapeutic benefit.    Home Program:   Warmth for stiffness  Ice for pain after activity  Gentle AROM of wrist and forearm all planes  Wrist E/F and R/U isometrics   Trial kinesiotape ulnar wrist  Wear OTC wrist splint sleeping and per symptoms day    Next Visit:  See in 1 week  Assess response to HEP and kinesiotape  Consider US to ulnar wrist if continued tenderness  Progress to  strengthening and wrist stabilization exercises as tolerated  Consider custom ulnar gutter orthosis if no change  Measure uninjured wrist ROM and evaluation ulnar nerve symptoms

## 2019-06-12 ENCOUNTER — THERAPY VISIT (OUTPATIENT)
Dept: OCCUPATIONAL THERAPY | Facility: CLINIC | Age: 39
End: 2019-06-12
Payer: COMMERCIAL

## 2019-06-12 DIAGNOSIS — M25.531 RIGHT WRIST PAIN: ICD-10-CM

## 2019-06-12 DIAGNOSIS — S63.501S RIGHT WRIST SPRAIN, SEQUELA: ICD-10-CM

## 2019-06-12 PROCEDURE — 97112 NEUROMUSCULAR REEDUCATION: CPT | Mod: GO | Performed by: OCCUPATIONAL THERAPIST

## 2019-06-12 PROCEDURE — 97110 THERAPEUTIC EXERCISES: CPT | Mod: GO | Performed by: OCCUPATIONAL THERAPIST

## 2019-06-12 PROCEDURE — 97035 APP MDLTY 1+ULTRASOUND EA 15: CPT | Mod: GO | Performed by: OCCUPATIONAL THERAPIST

## 2019-06-12 NOTE — PROGRESS NOTES
SOAP note objective information for 6/12/2019:    Pain Level (Scale 0-10)   6/6/19 6/12/19   At Rest 0-1 0   With Use 3-6 3-4     ROM  Pain Report:  + mild    ++ moderate    +++ severe   Wrist 6/6/19 6/6/19 6/12/19   AROM (PROM) Left Right Right   Extension 75 45+ 50-   Flexion 70 0++ 60-   RD 25 20+ 25-   UD 45 10++ 35-   Supination 80 30++ 40+   Pronation 80 75+ 75-     Tenderness   Pain Report:  + mild    ++ moderate    +++ severe     6/6/2019 6/12/19   DRUJ + -   Dorsal wrist/EDC + slight -   Volar radial wrist/scaphoid/FCR - -   Radial wrist/1st discontinue/radial styloid - -   ECU/dorsal ulnar wrist - +   TFCC ++ +   Volar ulnar wrist/FCU - -     Home Program:   Warmth for stiffness  Ice for pain after activity  Gentle AROM of wrist and forearm all planes  Wrist E/F and R/U isometrics    strengthening  Wrist stabilization with ball on wall  Kinesiotape ulnar wrist  Wear OTC wrist splint sleeping and per symptoms day    Next Visit:  See in 2 weeks  Assess response to  strengthening and wrist stabilization exercises  Continue US to ulnar wrist  Progress to dynamic wrist stabilization exercises with BOING  Defer custom ulnar gutter orthosis as pain improving  Evaluate ulnar nerve symptoms     Please refer to the daily flowsheet for treatment today, total treatment time and time spent performing 1:1 timed codes.

## 2019-06-27 ENCOUNTER — MYC MEDICAL ADVICE (OUTPATIENT)
Dept: FAMILY MEDICINE | Facility: CLINIC | Age: 39
End: 2019-06-27

## 2019-06-27 ENCOUNTER — THERAPY VISIT (OUTPATIENT)
Dept: OCCUPATIONAL THERAPY | Facility: CLINIC | Age: 39
End: 2019-06-27
Payer: COMMERCIAL

## 2019-06-27 DIAGNOSIS — M25.531 RIGHT WRIST PAIN: Primary | ICD-10-CM

## 2019-06-27 DIAGNOSIS — S63.501S RIGHT WRIST SPRAIN, SEQUELA: ICD-10-CM

## 2019-06-27 DIAGNOSIS — M25.531 RIGHT WRIST PAIN: ICD-10-CM

## 2019-06-27 PROCEDURE — 97112 NEUROMUSCULAR REEDUCATION: CPT | Mod: GO | Performed by: OCCUPATIONAL THERAPIST

## 2019-06-27 PROCEDURE — 97035 APP MDLTY 1+ULTRASOUND EA 15: CPT | Mod: GO | Performed by: OCCUPATIONAL THERAPIST

## 2019-06-27 PROCEDURE — 97110 THERAPEUTIC EXERCISES: CPT | Mod: GO | Performed by: OCCUPATIONAL THERAPIST

## 2019-06-27 NOTE — PROGRESS NOTES
SOAP note objective information for 6/27/2019:    Pain Level (Scale 0-10)   6/6/19 6/12/19 6/27/19   At Rest 0-1 0    With Use 3-6 3-4 1-2   Worst   6-7     ROM  Pain Report:  + mild    ++ moderate    +++ severe   Wrist 6/6/19 6/6/19 6/12/19 6/27/19   AROM (PROM) Left Right Right Right   Extension 75 45+ 50- 65-   Flexion 70 0++ 60- 60-   RD 25 20+ 25-    UD 45 10++ 35-    Supination 80 30++ 40+ 45+   Pronation 80 75+ 75- 70+     Tenderness   Pain Report:  + mild    ++ moderate    +++ severe     6/6/2019 6/12/19 6/27/19   DRUJ + - -   Dorsal wrist/EDC + slight - -   Volar radial wrist/scaphoid/FCR - - -   Radial wrist/1st discontinue/radial styloid - - -   ECU/dorsal ulnar wrist - + +   TFCC ++ + +   Volar ulnar wrist/FCU - - -     Special Tests   6/27/19   Tinels at guyons canal  + slight   Tinels at cubital tunnel -   Elbow flexion test + slight     Home Program:   Warmth for stiffness  Ice for pain after activity  Gentle AROM of wrist and forearm all planes  Wrist E/F and R/U isometrics    strengthening  Wrist stabilization with ball on wall and BOING  Kinesiotape ulnar wrist, self application as needed  Wear OTC wrist splint sleeping and per symptoms day  Avoid prolonged elbow flexion and leaning on elbow to avoid ulnar nerve irritation     Next Visit:  See in 1-2 weeks  Assess response to wrist stabilization exercise with BOING and self taping  Continue US to ulnar wrist  Defer custom ulnar gutter orthosis as pain improving    Please refer to the daily flowsheet for treatment today, total treatment time and time spent performing 1:1 timed codes.

## 2019-07-01 ENCOUNTER — OFFICE VISIT (OUTPATIENT)
Dept: ORTHOPEDICS | Facility: CLINIC | Age: 39
End: 2019-07-01
Payer: COMMERCIAL

## 2019-07-01 ENCOUNTER — ANCILLARY PROCEDURE (OUTPATIENT)
Dept: GENERAL RADIOLOGY | Facility: CLINIC | Age: 39
End: 2019-07-01
Attending: PEDIATRICS
Payer: COMMERCIAL

## 2019-07-01 VITALS
DIASTOLIC BLOOD PRESSURE: 85 MMHG | BODY MASS INDEX: 45.75 KG/M2 | SYSTOLIC BLOOD PRESSURE: 130 MMHG | HEIGHT: 64 IN | WEIGHT: 268 LBS

## 2019-07-01 DIAGNOSIS — S69.91XA INJURY OF RIGHT WRIST, INITIAL ENCOUNTER: Primary | ICD-10-CM

## 2019-07-01 DIAGNOSIS — S69.91XA RIGHT WRIST INJURY: ICD-10-CM

## 2019-07-01 PROCEDURE — 73110 X-RAY EXAM OF WRIST: CPT | Mod: RT

## 2019-07-01 PROCEDURE — 99244 OFF/OP CNSLTJ NEW/EST MOD 40: CPT | Performed by: PEDIATRICS

## 2019-07-01 ASSESSMENT — MIFFLIN-ST. JEOR: SCORE: 1884.61
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
## 2019-07-01 NOTE — PATIENT INSTRUCTIONS
We discussed these other possible diagnosis: TFCC tear, Scapholunate ligament tear    Plan:  - Today's Plan of Care:  MRI Arthrogram of the right wrist - Call [phone] to schedule MRI  Wrist Brace    -We also discussed other future treatment options:  Referral to Hand Specialist, Further OT    Follow Up: In clinic with Dr. Yusuf after MRI (wait at least 1-2 days)    If you have any further questions for your physician or physician s care team you can call [phone] and use option 3 to leave a voice message. Calls received during business hours will be returned same day.
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
## 2019-07-11 ENCOUNTER — THERAPY VISIT (OUTPATIENT)
Dept: OCCUPATIONAL THERAPY | Facility: CLINIC | Age: 39
End: 2019-07-11
Payer: COMMERCIAL

## 2019-07-11 DIAGNOSIS — S63.501S RIGHT WRIST SPRAIN, SEQUELA: ICD-10-CM

## 2019-07-11 DIAGNOSIS — M25.531 RIGHT WRIST PAIN: Primary | ICD-10-CM

## 2019-07-11 PROCEDURE — 97035 APP MDLTY 1+ULTRASOUND EA 15: CPT | Mod: GO | Performed by: OCCUPATIONAL THERAPIST

## 2019-07-11 PROCEDURE — 97140 MANUAL THERAPY 1/> REGIONS: CPT | Mod: GO | Performed by: OCCUPATIONAL THERAPIST

## 2019-07-11 PROCEDURE — 97110 THERAPEUTIC EXERCISES: CPT | Mod: GO | Performed by: OCCUPATIONAL THERAPIST

## 2019-07-11 NOTE — PROGRESS NOTES
SOAP note objective information for 7/11/2019:    Pain Level (Scale 0-10)   6/6/19 6/12/19 6/27/19 7/11/19   At Rest 0-1 0     With Use 3-6 3-4 1-2 3-4   Worst   6-7 4     ROM  Pain Report:  + mild    ++ moderate    +++ severe   Wrist 6/6/19 6/6/19 6/12/19 6/27/19 7/11/19   AROM (PROM) Left Right Right Right Right   Extension 75 45+ 50- 65- 70-   Flexion 70 0++ 60- 60- 60-   RD 25 20+ 25-     UD 45 10++ 35-     Supination 80 30++ 40+ 45+ 50+   Pronation 80 75+ 75- 70+ 80+ slight     Tenderness   Pain Report:  + mild    ++ moderate    +++ severe     6/6/2019 6/12/19 6/27/19 7/11/19   DRUJ + - - -   Dorsal wrist/EDC + slight - - -   Volar radial wrist/scaphoid/FCR - - - -   Radial wrist/1st discontinue/radial styloid - - - -   ECU/dorsal ulnar wrist - + + +   TFCC ++ + + +   Volar ulnar wrist/FCU - - - -     Special Tests   6/27/19   Tinels at guyons canal  + slight   Tinels at cubital tunnel -   Elbow flexion test + slight     Home Program:   Warmth for stiffness  Ice for pain after activity  Gentle AROM of wrist and forearm all planes  Wrist E/F and R/U isometrics    strengthening  Wrist stabilization with ball on wall and BOING  Kinesiotape ulnar wrist, self application as needed  Wear OTC wrist splint sleeping and per symptoms day  Avoid prolonged elbow flexion and leaning on elbow to avoid ulnar nerve irritation     Next Visit:  See in 2 weeks  Await results of MRI  Continue US to ulnar wrist, TFM/MFR, ROM and stabilization     Please refer to the daily flowsheet for treatment today, total treatment time and time spent performing 1:1 timed codes.

## 2019-07-19 ENCOUNTER — HOSPITAL ENCOUNTER (OUTPATIENT)
Dept: GENERAL RADIOLOGY | Facility: CLINIC | Age: 39
Discharge: HOME | End: 2019-07-19
Attending: PEDIATRICS | Admitting: PEDIATRICS
Payer: COMMERCIAL

## 2019-07-19 ENCOUNTER — HOSPITAL ENCOUNTER (OUTPATIENT)
Dept: MRI IMAGING | Facility: CLINIC | Age: 39
End: 2019-07-19
Attending: PEDIATRICS
Payer: COMMERCIAL

## 2019-07-19 DIAGNOSIS — S69.91XA INJURY OF RIGHT WRIST, INITIAL ENCOUNTER: ICD-10-CM

## 2019-07-19 PROCEDURE — 27211110 XR WRIST CONTRAST CT/MR INJECTION

## 2019-07-19 PROCEDURE — A9579 GAD-BASE MR CONTRAST NOS,1ML: HCPCS | Performed by: PEDIATRICS

## 2019-07-19 PROCEDURE — 73222 MRI JOINT UPR EXTREM W/DYE: CPT | Mod: RT

## 2019-07-19 PROCEDURE — 25000125 ZZHC RX 250: Performed by: PEDIATRICS

## 2019-07-19 PROCEDURE — 25500064 ZZH RX 255 OP 636: Performed by: PEDIATRICS

## 2019-07-19 PROCEDURE — 25000128 H RX IP 250 OP 636: Performed by: PEDIATRICS

## 2019-07-19 RX ORDER — EPINEPHRINE 1 MG/ML
0.05 INJECTION, SOLUTION, CONCENTRATE INTRAVENOUS ONCE
Status: COMPLETED | OUTPATIENT
Start: 2019-07-19 | End: 2019-07-19

## 2019-07-19 RX ORDER — IOPAMIDOL 408 MG/ML
10 INJECTION, SOLUTION INTRATHECAL ONCE
Status: COMPLETED | OUTPATIENT
Start: 2019-07-19 | End: 2019-07-19

## 2019-07-19 RX ORDER — LIDOCAINE HYDROCHLORIDE 10 MG/ML
5 INJECTION, SOLUTION EPIDURAL; INFILTRATION; INTRACAUDAL; PERINEURAL ONCE
Status: COMPLETED | OUTPATIENT
Start: 2019-07-19 | End: 2019-07-19

## 2019-07-19 RX ADMIN — EPINEPHRINE 0.05 MG: 1 INJECTION, SOLUTION, CONCENTRATE INTRAVENOUS at 14:59

## 2019-07-19 RX ADMIN — IOPAMIDOL 2 ML: 408 INJECTION, SOLUTION INTRATHECAL at 14:59

## 2019-07-19 RX ADMIN — GADOPENTETATE DIMEGLUMINE 0.05 ML: 469.01 INJECTION INTRAVENOUS at 14:59

## 2019-07-19 RX ADMIN — LIDOCAINE HYDROCHLORIDE 1 ML: 10 INJECTION, SOLUTION EPIDURAL; INFILTRATION; INTRACAUDAL; PERINEURAL at 15:00

## 2019-07-22 ENCOUNTER — OFFICE VISIT (OUTPATIENT)
Dept: ORTHOPEDICS | Facility: CLINIC | Age: 39
End: 2019-07-22
Payer: COMMERCIAL

## 2019-07-22 VITALS — BODY MASS INDEX: 45.93 KG/M2 | HEIGHT: 64 IN | WEIGHT: 269 LBS

## 2019-07-22 DIAGNOSIS — S63.591D COMPLEX TEAR OF TRIANGULAR FIBROCARTILAGE OF RIGHT WRIST, SUBSEQUENT ENCOUNTER: Primary | ICD-10-CM

## 2019-07-22 PROCEDURE — 99214 OFFICE O/P EST MOD 30 MIN: CPT | Performed by: PEDIATRICS

## 2019-07-22 ASSESSMENT — MIFFLIN-ST. JEOR: SCORE: 1889.15

## 2019-07-22 NOTE — PATIENT INSTRUCTIONS
Plan:  - Today's Plan of Care:  Referral to Hand Surgery  Continue to Brace    Follow Up: as needed    If you have any further questions for your physician or physician s care team you can call [phone] and use option 3 to leave a voice message. Calls received during business hours will be returned same day.
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
## 2019-07-22 NOTE — PROGRESS NOTES
Sports Medicine Clinic Visit - Interim History 2019    PCP: Birgit Curtis    Jennifer Valentin is a 38 year old female who is seen in f/u up for Complex tear of triangular fibrocartilage of right wrist, subsequent encounter. Since last visit on 19, patient has completed a right wrist arthrogram MRI. She reports no significant change in her pain or symptoms. She has been using a wrist brace at night.  Here to review MRI results.    Initial Injury History 2019: Patient reports an injury 1 month ago.  She was chasing her dog and tripped - landed on street, describes a FOOSH injury.  Most of the pain is distal ulna, previously had pain distal radius.    Symptoms are better with: rest  Symptoms are worse with: extension and ulnar deviation  Additional Features:   Positive: swelling, paresthesias, numbness and weakness   Negative: bruising, popping, grinding, catching, locking and instability    Social History: [employer] tech    Review of Systems  Skin: no bruising, no swelling  Musculoskeletal: as above  Neurologic: no numbness, paresthesias  Remainder of review of systems is negative including constitutional, CV, pulmonary, GI, except as noted in HPI or medical history.    Patient's current problem list, past medical and surgical history, and family history were reviewed.    Patient Active Problem List   Diagnosis     Obesity     Heavy period     CARDIOVASCULAR SCREENING; LDL GOAL LESS THAN 160     Encounter for routine gynecological examination     Contraception -- current partner vasectomy     Family history of thyroid disease     Subclinical hypothyroidism     Elevated LFTs     Morbid obesity (H)     Right wrist sprain, sequela     Right wrist pain     Past Medical History:   Diagnosis Date     BACKACHE NOS 10/10/2006     Menarche age 11    cycles q mo x 6 d occasional cramps     Past Surgical History:   Procedure Laterality Date     C  DELIVERY ONLY       COLONOSCOPY  2014     "Procedure: COLONOSCOPY;  Surgeon: Lyndon Stark MD;  Location: [facility]     LAPAROSCOPIC CHOLECYSTECTOMY WITH CHOLANGIOGRAMS  2014    Procedure: LAPAROSCOPIC CHOLECYSTECTOMY WITH CHOLANGIOGRAMS;  Surgeon: Julius Butt MD;  Location: [facility]     Family History   Problem Relation Age of Onset     Thyroid Disease Mother 30        hypothyroidism.      Anxiety Disorder Mother      Musculoskeletal Disorder Father          in a motorcycle accident , age 20's or early 30's     Genitourinary Problems Son         kidney reflux     C.A.D. No family hx of      Diabetes No family hx of      Hypertension No family hx of      Breast Cancer No family hx of      Cancer - colorectal No family hx of      Prostate Cancer No family hx of      Colon Cancer No family hx of      Coronary Artery Disease No family hx of        Objective  Ht 1.632 m (5' 4.25\")   Wt 122 kg (269 lb)   BMI 45.82 kg/m      GENERAL APPEARANCE: healthy, alert and no distress   GAIT: NORMAL  SKIN: no suspicious lesions or rashes  HEENT: Sclera clear, anicteric  CV: good peripheral pulses  RESP: Breathing not labored  NEURO: Normal strength and tone, mentation intact and speech normal  PSYCH:  mentation appears normal and affect normal/bright    Bilateral Wrist and Hand exam  Inspection:       No swelling, bruising or deformity bilateral     Tender:       TFCC right     Non Tender:       Remainder of the Wrist and Hand right     ROM:       Full and symmetric active and passive range of motion of the forearm, wrist and digits bilateral     Strength:       5/5 strength in the muscles of the hand, wrist and forearm bilateral     Special Tests:        positive (+) TFCC compression test right     Neurovascular:       2+ radial pulses bilaterally with brisk capillary refill and      normal sensation to light touch in the radial, median and ulnar nerve distributions    Radiology  I ordered, visualized and reviewed these images with the patient  MR RIGHT WRIST " ARTHROGRAM WITH CONTRAST 7/19/2019 3:21 PM     HISTORY: Evaluate triangular fibrocartilage complex, scapholunate.  Injury of right wrist, initial encounter.     TECHNIQUE:  Multiplanar, multisequence with intraarticular contrast.  Injection procedure dictated separately.     FINDINGS:   Osseous and Cartilaginous Structures: Unremarkable, with no bone  contusion or fracture.     Scapholunate and Lunotriquetral Ligaments: No tear identified.     Triangular Fibrocartilage Complex: There is a small full-thickness  tear involving the central portion of the mid triangular  fibrocartilage. There is also a separate partial thickness proximal  surface tear of the triangular fibrocartilage.     Extensor Tendons: Some contrast injected into tendon sheaths. No tear,  or tendinosis identified.     Flexor Tendons: Unremarkable. No tear, tendinosis, or tenosynovitis  identified.     Joint Spaces: No definite synovitis is appreciated.     Additional Findings:  The carpal tunnel and median nerve appear  unremarkable. Guyon's canal is unremarkable. There is a focal area of  fluidlike signal at the dorsal aspect of the yvsbvbbo-rbpsos-fzxasict  region and proximal capitate. This is seen on series 101 images 11-12  and felt to represent a ganglion cyst measuring 0.8 x 0.3 x 0.6 cm                                                                      IMPRESSION:  1. Tearing of the TFC.  2. 0.8 cm dorsal ganglion cyst.       Assessment:  1. Complex tear of triangular fibrocartilage of right wrist, subsequent encounter      TFCC tear.  Given lack of improvement with immobilization and therapy, will refer to hand surgery to discuss next steps in treatment.  Continue to brace in the interim.    Plan:  - Today's Plan of Care:  Referral to Hand Surgery  Continue to Brace    Follow Up: as needed    Concerning signs and symptoms were reviewed.  The patient expressed understanding of this management plan and all questions were answered at this  time.    Marielos Yusuf MD CAQ  Primary Care Sports Medicine  [facility]

## 2019-07-29 ENCOUNTER — TRANSFERRED RECORDS (OUTPATIENT)
Dept: HEALTH INFORMATION MANAGEMENT | Facility: CLINIC | Age: 39
End: 2019-07-29

## 2019-08-07 ENCOUNTER — MYC MEDICAL ADVICE (OUTPATIENT)
Dept: FAMILY MEDICINE | Facility: CLINIC | Age: 39
End: 2019-08-07

## 2019-08-08 NOTE — TELEPHONE ENCOUNTER
Please see [employer] message.  Letter routed to [employer] pool.  Please print and leave at  for patient.

## 2019-09-11 PROBLEM — M25.531 RIGHT WRIST PAIN: Status: RESOLVED | Noted: 2019-06-06 | Resolved: 2019-09-11

## 2019-09-11 PROBLEM — S63.501S RIGHT WRIST SPRAIN, SEQUELA: Status: RESOLVED | Noted: 2019-06-06 | Resolved: 2019-09-11

## 2019-09-16 ENCOUNTER — TRANSFERRED RECORDS (OUTPATIENT)
Dept: HEALTH INFORMATION MANAGEMENT | Facility: CLINIC | Age: 39
End: 2019-09-16

## 2019-09-20 NOTE — PROGRESS NOTES
[facility]  [address] [address]  [phone]  Dept: [phone]    PRE-OP EVALUATION:  Today's date: 2019    Jennifer Valentin (: 1980) presents for pre-operative evaluation.  She requires evaluation and anesthesia risk assessment prior to undergoing surgery/procedure for treatment of right wrist .    Proposed Surgery/ Procedure: Arthroscopic Right Wrist With Triangular Fibrocartilidge Complex Debridement  Date of Surgery/ Procedure: 10/1/2019  Time of Surgery/ Procedure: 9:00AM  Hospital/Surgical Facility: [facility]  Fax number for surgical facility: [phone]  Primary Physician: Birgit Curtis  Type of Anesthesia Anticipated: General    Patient has a Health Care Directive or Living Will:  NO    1. NO - Do you have a history of heart attack, stroke, stent, bypass or surgery on an artery in the head, neck, heart or legs?  2. NO - Do you ever have any pain or discomfort in your chest?  3. NO - Do you have a history of  Heart Failure?  4. NO - Are you troubled by shortness of breath when: walking on the level, up a slight hill or at night?  5. NO - Do you currently have a cold, bronchitis or other respiratory infection?  6. NO - Do you have a cough, shortness of breath or wheezing?  7. NO - Do you sometimes get pains in the calves of your legs when you walk?  8. NO - Do you or anyone in your family have previous history of blood clots?  9. NO - Do you or does anyone in your family have a serious bleeding problem such as prolonged bleeding following surgeries or cuts?  10. NO - Have you ever had problems with anemia or been told to take iron pills?  11. NO - Have you had any abnormal blood loss such as black, tarry or bloody stools, or abnormal vaginal bleeding?  12. NO - Have you ever had a blood transfusion?  13. NO - Have you or any of your relatives ever had problems with anesthesia?  14. NO - Do you have sleep apnea,  excessive snoring or daytime drowsiness?  15. NO - Do you have any prosthetic heart valves?  16. NO - Do you have prosthetic joints?  17. NO - Is there any chance that you may be pregnant?      HPI:     HPI related to upcoming procedure: Patient reports injuring her right wrist on 5/28/2019 when she tripped and fell while she was chasing her dog. She locates wrist pain over the ulnar aspect of the wrist. She continues to note pain with lifting, pushing, and pulling with the wrist. She has not been taking any any form of medication for her pain. The patient is right hand dominant. The patient denies any other concerns or complaints today.     Outside plain films of the right wrist demonstrates: Widening of the scapholunate junction with significantly increased scapholunate angle on the lateral view.  MRI of the right wrist dated 7/19/2019 demonstrates: 1. Tearing of the TFCC. 2. 0.8 cm dorsal ganglion cyst. Scapholunate ligament is intact.    Plan:  Briefly discussed the potential for arthroscopic surgery as a treatment option should her wrist continue to be symptomatic after the steroid injection.      See problem list for active medical problems.  Problems all longstanding and stable, except as noted/documented.  See ROS for pertinent symptoms related to these conditions.      MEDICAL HISTORY:     Patient Active Problem List    Diagnosis Date Noted     Morbid obesity (H) 01/31/2019     Priority: Medium     Elevated LFTs 02/05/2018     Priority: Medium     Subclinical hypothyroidism 11/30/2015     Priority: Medium     Family history of thyroid disease 11/17/2015     Priority: Medium     Obesity 09/05/2014     Priority: Medium     October 19, 2011 There is no height or weight on file to calculate BMI.   Problem list name updated by automated process. Provider to review       Contraception -- current partner vasectomy 09/05/2014     Priority: Medium     Encounter for routine gynecological examination 09/19/2013      Priority: Medium     13: Pap->NIL  Problem list name updated by automated process. Provider to review       CARDIOVASCULAR SCREENING; LDL GOAL LESS THAN 160 10/31/2010     Priority: Medium     Heavy period 2009     Priority: Medium     2013 tolerable.         Past Medical History:   Diagnosis Date     BACKACHE NOS 10/10/2006     Menarche age 11    cycles q mo x 6 d occasional cramps     Past Surgical History:   Procedure Laterality Date     C  DELIVERY ONLY       COLONOSCOPY  2014    Procedure: COLONOSCOPY;  Surgeon: Lyndon Stark MD;  Location: [facility]     LAPAROSCOPIC CHOLECYSTECTOMY WITH CHOLANGIOGRAMS  2014    Procedure: LAPAROSCOPIC CHOLECYSTECTOMY WITH CHOLANGIOGRAMS;  Surgeon: Julius Butt MD;  Location: [facility]     Current Outpatient Medications   Medication Sig Dispense Refill     levothyroxine (SYNTHROID/LEVOTHROID) 25 MCG tablet Take 1 tablet (25 mcg) by mouth daily 90 tablet 3     OTC products: none    Allergies   Allergen Reactions     Nka [No Known Allergies]       Latex Allergy: NO    Social History     Tobacco Use     Smoking status: Never Smoker     Smokeless tobacco: Never Used   Substance Use Topics     Alcohol use: No     History   Drug Use No       REVIEW OF SYSTEMS:   CONSTITUTIONAL: NEGATIVE for fever, chills, change in weight  INTEGUMENTARY/SKIN: NEGATIVE for worrisome rashes, moles or lesions  EYES: NEGATIVE for vision changes or irritation  ENT/MOUTH: NEGATIVE for ear, mouth and throat problems  RESP: NEGATIVE for significant cough or SOB  CV: NEGATIVE for chest pain, palpitations or peripheral edema  GI: NEGATIVE for nausea, abdominal pain, heartburn, or change in bowel habits  : NEGATIVE for frequency, dysuria, or hematuria  MUSCULOSKELETAL: POSITIVE for right wrist pain  ENDOCRINE: NEGATIVE for temperature intolerance, skin/hair changes  HEME: NEGATIVE for bleeding problems  PSYCHIATRIC: NEGATIVE for changes in mood or affect    This  document serves as a record of the services and decisions personally performed and made by Birgit Curtis MD. It was created on his behalf by Luis Fernando Garcia, a trained medical scribe. The creation of this document is based the provider's statements to the medical scribe.  Luis Fernando Garcia 4:56 PM September 27, 2019    EXAM:   /72   Pulse 72   Temp 97.9  F (36.6  C) (Tympanic)   Resp 14   Wt 122.6 kg (270 lb 4 oz)   BMI 46.03 kg/m      GENERAL APPEARANCE: alert, pleasant and no distress     EYES: EOMI, PERRL     HENT: ear canals and TM's normal and nose and mouth without ulcers or lesions     NECK: no adenopathy, no asymmetry, masses, or scars and thyroid normal to palpation     RESP: lungs clear to auscultation - no rales, rhonchi or wheezes     CV: regular rates and rhythm, normal S1 S2     ABDOMEN:  soft, nontender     MS: extremities normal- no gross deformities noted     SKIN: no suspicious lesions or rashes     PSYCH: mentation appears normal. and affect normal/bright     LYMPHATICS: No cervical adenopathy    DIAGNOSTICS:   EKG Not indicated.  Lab Results:   Results for orders placed or performed in visit on 09/27/19   CBC with platelets   Result Value Ref Range    WBC 9.5 4.0 - 11.0 10e9/L    RBC Count 4.50 3.8 - 5.2 10e12/L    Hemoglobin 13.4 11.7 - 15.7 g/dL    Hematocrit 40.1 35.0 - 47.0 %    MCV 89 78 - 100 fl    MCH 29.8 26.5 - 33.0 pg    MCHC 33.4 31.5 - 36.5 g/dL    RDW 12.9 10.0 - 15.0 %    Platelet Count 268 150 - 450 10e9/L   Basic metabolic panel  (Ca, Cl, CO2, Creat, Gluc, K, Na, BUN)   Result Value Ref Range    Sodium 136 133 - 144 mmol/L    Potassium 4.1 3.4 - 5.3 mmol/L    Chloride 104 94 - 109 mmol/L    Carbon Dioxide 27 20 - 32 mmol/L    Anion Gap 5 3 - 14 mmol/L    Glucose 100 (H) 70 - 99 mg/dL    Urea Nitrogen 10 7 - 30 mg/dL    Creatinine 0.56 0.52 - 1.04 mg/dL    GFR Estimate >90 >60 mL/min/[1.73_m2]    GFR Estimate If Black >90 >60 mL/min/[1.73_m2]    Calcium 8.9 8.5 - 10.1 mg/dL    HCG Qual, Urine ([id number])   Result Value Ref Range    HCG Qual Urine Negative NEG^Negative     IMPRESSION:     The proposed surgical procedure is considered LOW risk.    REVISED CARDIAC RISK INDEX  The patient has the following serious cardiovascular risks for perioperative complications such as (MI, PE, VFib and 3  AV Block):  No serious cardiac risks  INTERPRETATION: 0 risks: Class I (very low risk - 0.4% complication rate)    The patient has the following additional risks for perioperative complications:  No identified additional risks  Morbid obesity    (Z01.818) Preop general physical exam  (primary encounter diagnosis)  Comment: No contraindications.  Plan: CBC with platelets, Basic metabolic panel  (Ca,        Cl, CO2, Creat, Gluc, K, Na, BUN), HCG Qual,         Urine (HGT4661)          (S63.591D) TFCC (triangular fibrocartilage complex) tear, right, subsequent encounter  Comment: Failed conservative management.   Plan: Agree with surgery.     (Z78.9) Contraception -- current partner vasectomy    (E66.01,  Z68.42) Class 3 severe obesity due to excess calories without serious comorbidity with body mass index (BMI) of 45.0 to 49.9 in adult (H)    (E03.9) Subclinical hypothyroidism  Comment: Clinical euthyroid   TSH   Date Value Ref Range Status   01/25/2019 3.10 0.40 - 4.00 mU/L Final       RECOMMENDATIONS:       --Patient is to take all scheduled medications on the day of surgery EXCEPT for modifications listed below.    Anticoagulant or Antiplatelet Medication Use  NSAIDS: Ibuprofen (Motrin): Stop one day prior to surgery        APPROVAL GIVEN to proceed with proposed procedure, without further diagnostic evaluation     The information in this document, created by a scribe for me, accurately reflects the services I personally performed and the decisions made by me. I have reviewed and approved this document for accuracy.     Signed Electronically by: Birgit Curtis MD    Copy of this evaluation report is  provided to requesting physician.    [facility] Preop Guidelines    Revised Cardiac Risk Index

## 2019-09-27 ENCOUNTER — OFFICE VISIT (OUTPATIENT)
Dept: FAMILY MEDICINE | Facility: CLINIC | Age: 39
End: 2019-09-27
Payer: COMMERCIAL

## 2019-09-27 VITALS
DIASTOLIC BLOOD PRESSURE: 72 MMHG | WEIGHT: 270.25 LBS | RESPIRATION RATE: 14 BRPM | SYSTOLIC BLOOD PRESSURE: 126 MMHG | BODY MASS INDEX: 46.03 KG/M2 | TEMPERATURE: 97.9 F | HEART RATE: 72 BPM

## 2019-09-27 DIAGNOSIS — S63.591D TFCC (TRIANGULAR FIBROCARTILAGE COMPLEX) TEAR, RIGHT, SUBSEQUENT ENCOUNTER: ICD-10-CM

## 2019-09-27 DIAGNOSIS — E03.8 SUBCLINICAL HYPOTHYROIDISM: ICD-10-CM

## 2019-09-27 DIAGNOSIS — E66.813 CLASS 3 SEVERE OBESITY DUE TO EXCESS CALORIES WITHOUT SERIOUS COMORBIDITY WITH BODY MASS INDEX (BMI) OF 45.0 TO 49.9 IN ADULT (H): ICD-10-CM

## 2019-09-27 DIAGNOSIS — Z01.818 PREOP GENERAL PHYSICAL EXAM: Primary | ICD-10-CM

## 2019-09-27 DIAGNOSIS — E66.01 CLASS 3 SEVERE OBESITY DUE TO EXCESS CALORIES WITHOUT SERIOUS COMORBIDITY WITH BODY MASS INDEX (BMI) OF 45.0 TO 49.9 IN ADULT (H): ICD-10-CM

## 2019-09-27 LAB
ANION GAP SERPL CALCULATED.3IONS-SCNC: 5 MMOL/L (ref 3–14)
BUN SERPL-MCNC: 10 MG/DL (ref 7–30)
CALCIUM SERPL-MCNC: 8.9 MG/DL (ref 8.5–10.1)
CHLORIDE SERPL-SCNC: 104 MMOL/L (ref 94–109)
CO2 SERPL-SCNC: 27 MMOL/L (ref 20–32)
CREAT SERPL-MCNC: 0.56 MG/DL (ref 0.52–1.04)
ERYTHROCYTE [DISTWIDTH] IN BLOOD BY AUTOMATED COUNT: 12.9 % (ref 10–15)
GFR SERPL CREATININE-BSD FRML MDRD: >90 ML/MIN/{1.73_M2}
GLUCOSE SERPL-MCNC: 100 MG/DL (ref 70–99)
HCG UR QL: NEGATIVE
HCT VFR BLD AUTO: 40.1 % (ref 35–47)
HGB BLD-MCNC: 13.4 G/DL (ref 11.7–15.7)
MCH RBC QN AUTO: 29.8 PG (ref 26.5–33)
MCHC RBC AUTO-ENTMCNC: 33.4 G/DL (ref 31.5–36.5)
MCV RBC AUTO: 89 FL (ref 78–100)
PLATELET # BLD AUTO: 268 10E9/L (ref 150–450)
POTASSIUM SERPL-SCNC: 4.1 MMOL/L (ref 3.4–5.3)
RBC # BLD AUTO: 4.5 10E12/L (ref 3.8–5.2)
SODIUM SERPL-SCNC: 136 MMOL/L (ref 133–144)
WBC # BLD AUTO: 9.5 10E9/L (ref 4–11)

## 2019-09-27 PROCEDURE — 99214 OFFICE O/P EST MOD 30 MIN: CPT | Performed by: FAMILY MEDICINE

## 2019-09-27 PROCEDURE — 36415 COLL VENOUS BLD VENIPUNCTURE: CPT | Performed by: FAMILY MEDICINE

## 2019-09-27 PROCEDURE — 81025 URINE PREGNANCY TEST: CPT | Performed by: FAMILY MEDICINE

## 2019-09-27 PROCEDURE — 85027 COMPLETE CBC AUTOMATED: CPT | Performed by: FAMILY MEDICINE

## 2019-09-27 PROCEDURE — 80048 BASIC METABOLIC PNL TOTAL CA: CPT | Performed by: FAMILY MEDICINE

## 2019-09-27 ASSESSMENT — PAIN SCALES - GENERAL: PAINLEVEL: NO PAIN (0)

## 2019-09-28 PROBLEM — E66.813 CLASS 3 SEVERE OBESITY DUE TO EXCESS CALORIES WITHOUT SERIOUS COMORBIDITY WITH BODY MASS INDEX (BMI) OF 45.0 TO 49.9 IN ADULT (H): Status: ACTIVE | Noted: 2019-01-31

## 2019-10-08 NOTE — PROGRESS NOTES
Subjective     Jennifer Valentin is a 38 year old female who presents to clinic today for the following health issues:  *Patient will be getting flu shot with her family this month  HPI   Vaginal Bleeding (Dysmenorrhea)  Onset: 2 weeks    Description:   Duration of bleeding episodes: constant   Describe bleeding/flow:   Clots: YES  Number of pads/hour: 1- normal changing rate  Cramping: mild    Accompanying Signs & Symptoms:  Weakness: no  Lightheadedness: no  Hot flashes: no   Nosebleeds/Easy bruising: no  Vaginal Discharge: no    History:  Patient's last menstrual period was 09/26/2019 (exact date).  Previous normal periods: YES- last month had 2 cycles that were very light in one month; prior to that normal cyc  Contraceptive use: NO  Possibility of Pregnancy: no   Any bleeding after intercourse: no  Abnormal PAP Smears: no    Precipitating factors:   None    Alleviating factors:  None    Therapies Tried and outcome: None    Ally DeShong CMA    No changes in hair, nails or GI.   Some cramping.  No bloating.    No vaginal discharge.    Bleeding is constant for the past 2 weeks.    No dizziness.      No concern over STD's, one partner.  He has a vasectomy.    Pap is up to date    Non-smoker and no h/o DVT's    Previous ultrasound from 2009 showed:   IMPRESSION:   1. There is a ridge like area of thickness involving the posterior  endometrial wall but its maximal thickness is only 4 mm. No fingerlike  projections are seen to suggest a polyp.  2. There is a simple appearing 2.4 cm cyst in left ovary as well as an  adjacent 2.3 cm complex area. This complex area may represent a  hemorrhagic/collapsed small cyst. Followup ultrasound in 2-3 menstrual  cycles is recommended to confirm resolution.         Patient Active Problem List   Diagnosis     Heavy period     CARDIOVASCULAR SCREENING; LDL GOAL LESS THAN 160     Contraception -- current partner vasectomy     Family history of thyroid disease     Subclinical  hypothyroidism     Elevated LFTs     Class 3 severe obesity due to excess calories without serious comorbidity with body mass index (BMI) of 45.0 to 49.9 in adult (H)     Past Surgical History:   Procedure Laterality Date     C  DELIVERY ONLY       COLONOSCOPY  2014    Procedure: COLONOSCOPY;  Surgeon: Lyndon Stark MD;  Location: [facility]     LAPAROSCOPIC CHOLECYSTECTOMY WITH CHOLANGIOGRAMS  2014    Procedure: LAPAROSCOPIC CHOLECYSTECTOMY WITH CHOLANGIOGRAMS;  Surgeon: Julius Butt MD;  Location: [facility]       Social History     Tobacco Use     Smoking status: Never Smoker     Smokeless tobacco: Never Used   Substance Use Topics     Alcohol use: No     Family History   Problem Relation Age of Onset     Thyroid Disease Mother 30        hypothyroidism.      Anxiety Disorder Mother      Musculoskeletal Disorder Father          in a motorcycle accident , age 20's or early 30's     Genitourinary Problems Son         kidney reflux     C.A.D. No family hx of      Diabetes No family hx of      Hypertension No family hx of      Breast Cancer No family hx of      Cancer - colorectal No family hx of      Prostate Cancer No family hx of      Colon Cancer No family hx of      Coronary Artery Disease No family hx of          Current Outpatient Medications   Medication Sig Dispense Refill     levothyroxine (SYNTHROID/LEVOTHROID) 25 MCG tablet Take 1 tablet (25 mcg) by mouth daily 90 tablet 3     norethindrone-ethinyl estradiol (ORTHO-NOVUM 1-35 TAB,NORTREL 1-35 TAB) 1-35 MG-MCG tablet 1 tab 4 times per day x 1 day, 1 tab 3 times per day x 1 day, 1 tab twice per day x 1 day and then daily until done with the pill pack. 28 tablet 0     BP Readings from Last 3 Encounters:   10/09/19 122/72   19 126/72   19 130/85    Wt Readings from Last 3 Encounters:   10/09/19 121.1 kg (267 lb)   19 122.6 kg (270 lb 4 oz)   19 122 kg (269 lb)                      Reviewed and updated as needed  "this visit by Provider         Review of Systems   ROS COMP: Constitutional, HEENT, cardiovascular, pulmonary, gi and gu systems are negative, except as otherwise noted.      Objective    /72   Pulse 79   Temp 99.1  F (37.3  C) (Tympanic)   Resp 18   Ht 1.632 m (5' 4.25\")   Wt 121.1 kg (267 lb)   LMP 09/26/2019 (Exact Date)   SpO2 96%   Breastfeeding? No   BMI 45.47 kg/m    Body mass index is 45.47 kg/m .  Physical Exam   GENERAL: healthy, alert and no distress  CV: regular rate and rhythm, normal S1 S2, no S3 or S4, no murmur, click or rub, no peripheral edema and peripheral pulses strong  ABDOMEN: soft, nontender, no hepatosplenomegaly, no masses and bowel sounds normal  MS: no gross musculoskeletal defects noted, no edema    Diagnostic Test Results:  Labs reviewed in Epic        Assessment & Plan     1. Dysfunctional uterine bleeding.   Will check labs for thyroid and ensure hemoglobin/platelets are stable.   Will check an US for structural etiology.   In a monogamous relationship, therefore STD testing not performed today (however discussed with Jennifer that we should check this if symptoms persist) .  Pap is current.     Symptoms are most likely due to a hormone fluctuation and will treat with hormone cascade.      Will use hormones to stop bleeding as follows: 1/35 QID x 1 day, TID x 1 day, BID x 1 day and then daily until done with the pill pack.   - norethindrone-ethinyl estradiol (ORTHO-NOVUM 1-35 TAB,NORTREL 1-35 TAB) 1-35 MG-MCG tablet; 1 tab 4 times per day x 1 day, 1 tab 3 times per day x 1 day, 1 tab twice per day x 1 day and then daily until done with the pill pack.  Dispense: 28 tablet; Refill: 0  - TSH with free T4 reflex  - CBC with platelets  - US Pelvic Complete w Transvaginal; Future      Due to f/u on previous US and she will call to set up appt    2. Subclinical hypothyroidism  Will recheck thyroid levels, no other symptoms to suggest this is contributing.          BMI: " "  Estimated body mass index is 45.47 kg/m  as calculated from the following:    Height as of this encounter: 1.632 m (5' 4.25\").    Weight as of this encounter: 121.1 kg (267 lb).               Return in about 1 month (around 11/9/2019) for a recheck if symptoms do not improve.    Ana Anderson PA-C  [facility]        "

## 2019-10-09 ENCOUNTER — ANCILLARY PROCEDURE (OUTPATIENT)
Dept: ULTRASOUND IMAGING | Facility: CLINIC | Age: 39
End: 2019-10-09
Attending: PHYSICIAN ASSISTANT
Payer: COMMERCIAL

## 2019-10-09 ENCOUNTER — OFFICE VISIT (OUTPATIENT)
Dept: FAMILY MEDICINE | Facility: CLINIC | Age: 39
End: 2019-10-09
Payer: COMMERCIAL

## 2019-10-09 VITALS
SYSTOLIC BLOOD PRESSURE: 122 MMHG | DIASTOLIC BLOOD PRESSURE: 72 MMHG | RESPIRATION RATE: 18 BRPM | BODY MASS INDEX: 45.58 KG/M2 | HEART RATE: 79 BPM | WEIGHT: 267 LBS | TEMPERATURE: 99.1 F | OXYGEN SATURATION: 96 % | HEIGHT: 64 IN

## 2019-10-09 DIAGNOSIS — N93.8 DYSFUNCTIONAL UTERINE BLEEDING: Primary | ICD-10-CM

## 2019-10-09 DIAGNOSIS — E03.8 SUBCLINICAL HYPOTHYROIDISM: ICD-10-CM

## 2019-10-09 DIAGNOSIS — N93.8 DYSFUNCTIONAL UTERINE BLEEDING: ICD-10-CM

## 2019-10-09 LAB
ERYTHROCYTE [DISTWIDTH] IN BLOOD BY AUTOMATED COUNT: 12.8 % (ref 10–15)
HCT VFR BLD AUTO: 42.3 % (ref 35–47)
HGB BLD-MCNC: 14 G/DL (ref 11.7–15.7)
MCH RBC QN AUTO: 29.5 PG (ref 26.5–33)
MCHC RBC AUTO-ENTMCNC: 33.1 G/DL (ref 31.5–36.5)
MCV RBC AUTO: 89 FL (ref 78–100)
PLATELET # BLD AUTO: 298 10E9/L (ref 150–450)
RBC # BLD AUTO: 4.74 10E12/L (ref 3.8–5.2)
TSH SERPL DL<=0.005 MIU/L-ACNC: 2.09 MU/L (ref 0.4–4)
WBC # BLD AUTO: 7.2 10E9/L (ref 4–11)

## 2019-10-09 PROCEDURE — 36415 COLL VENOUS BLD VENIPUNCTURE: CPT | Performed by: PHYSICIAN ASSISTANT

## 2019-10-09 PROCEDURE — 85027 COMPLETE CBC AUTOMATED: CPT | Performed by: PHYSICIAN ASSISTANT

## 2019-10-09 PROCEDURE — 99214 OFFICE O/P EST MOD 30 MIN: CPT | Performed by: PHYSICIAN ASSISTANT

## 2019-10-09 PROCEDURE — 84443 ASSAY THYROID STIM HORMONE: CPT | Performed by: PHYSICIAN ASSISTANT

## 2019-10-09 PROCEDURE — 76830 TRANSVAGINAL US NON-OB: CPT

## 2019-10-09 PROCEDURE — 76856 US EXAM PELVIC COMPLETE: CPT | Mod: 59

## 2019-10-09 ASSESSMENT — MIFFLIN-ST. JEOR: SCORE: 1880.07

## 2019-10-09 NOTE — PATIENT INSTRUCTIONS
Please call [facility] at [phone]  to set up the pelvic ultrasound    We will start a hormone cascade today to help stop your bleeding.    Si tab 4 times per day x 1 day, 1 tab 3 times per day x 1 day, 1 tab twice per day x 1 day and then daily until done with the pill pack.

## 2019-10-14 ENCOUNTER — TRANSFERRED RECORDS (OUTPATIENT)
Dept: HEALTH INFORMATION MANAGEMENT | Facility: CLINIC | Age: 39
End: 2019-10-14

## 2019-10-29 ENCOUNTER — OFFICE VISIT (OUTPATIENT)
Dept: OBGYN | Facility: CLINIC | Age: 39
End: 2019-10-29
Payer: COMMERCIAL

## 2019-10-29 VITALS
BODY MASS INDEX: 45.55 KG/M2 | RESPIRATION RATE: 16 BRPM | WEIGHT: 266.8 LBS | HEART RATE: 76 BPM | SYSTOLIC BLOOD PRESSURE: 140 MMHG | HEIGHT: 64 IN | TEMPERATURE: 98.6 F | DIASTOLIC BLOOD PRESSURE: 90 MMHG

## 2019-10-29 DIAGNOSIS — N92.0 MENORRHAGIA WITH REGULAR CYCLE: Primary | ICD-10-CM

## 2019-10-29 DIAGNOSIS — N94.6 DYSMENORRHEA: ICD-10-CM

## 2019-10-29 DIAGNOSIS — N88.9 LESION OF CERVIX: ICD-10-CM

## 2019-10-29 PROCEDURE — 99204 OFFICE O/P NEW MOD 45 MIN: CPT | Performed by: OBSTETRICS & GYNECOLOGY

## 2019-10-29 ASSESSMENT — MIFFLIN-ST. JEOR: SCORE: 1874.17

## 2019-10-29 NOTE — PROGRESS NOTES
CC:  Consult from Birgit Curtis  for heavy, painful periods and abnormal ultrasound  HPI:  Jennifer Valentin is a 39 year old female is a   .  Patient's last menstrual period was 10/24/2019.  Menses are regular q 28-30 days, lasting 7 days. She recently had a period that lasted 2 1/2 weeks with large clots and cramping.  A pelvic ULTRASOUND was ordered and this was reviewed today.  This found a lesion in her cervix that had increased blood flow.  She is interested in a definitive therapy for the heavy and painful periods that she has experienced since menarche @ 9 years old.  She is sexually active with one partner using Vasectomy for contraception   She has no desire for future fertility.  She has no other bleeding diatheses.    Patients records are available and reviewed at today's visit.    Past GYN history:  No STD history       Last PAP smear:  Normal  Last TSH:   TSH   Date Value Ref Range Status   10/09/2019 2.09 0.40 - 4.00 mU/L Final    ', normal?  Yes    Past Medical History:   Diagnosis Date     BACKACHE NOS 10/10/2006     Menarche age 11    cycles q mo x 6 d occasional cramps       Past Surgical History:   Procedure Laterality Date     C  DELIVERY ONLY       COLONOSCOPY  2014    Procedure: COLONOSCOPY;  Surgeon: Lyndon Stark MD;  Location: [facility]     LAPAROSCOPIC CHOLECYSTECTOMY WITH CHOLANGIOGRAMS  2014    Procedure: LAPAROSCOPIC CHOLECYSTECTOMY WITH CHOLANGIOGRAMS;  Surgeon: Julius Butt MD;  Location: [facility]       Family History   Problem Relation Age of Onset     Thyroid Disease Mother 30        hypothyroidism.      Anxiety Disorder Mother      Musculoskeletal Disorder Father          in a motorcycle accident , age 20's or early 30's     Genitourinary Problems Son         kidney reflux     C.A.D. No family hx of      Diabetes No family hx of      Hypertension No family hx of      Breast Cancer No family hx of      Cancer - colorectal No family hx of       "Prostate Cancer No family hx of      Colon Cancer No family hx of      Coronary Artery Disease No family hx of        Allergies: Nka [no known allergies]    Current Outpatient Medications   Medication Sig Dispense Refill     levothyroxine (SYNTHROID/LEVOTHROID) 25 MCG tablet Take 1 tablet (25 mcg) by mouth daily 90 tablet 3     norethindrone-ethinyl estradiol (ORTHO-NOVUM 1-35 TAB,NORTREL 1-35 TAB) 1-35 MG-MCG tablet 1 tab 4 times per day x 1 day, 1 tab 3 times per day x 1 day, 1 tab twice per day x 1 day and then daily until done with the pill pack. (Patient not taking: Reported on 10/29/2019) 28 tablet 0       ROS:  C: NEGATIVE for fever, chills, change in weight  I: NEGATIVE for worrisome rashes, moles or lesions  E: NEGATIVE for vision changes or irritation  E/M: NEGATIVE for ear, mouth and throat problems  R: NEGATIVE for significant cough or SOB  CV: NEGATIVE for chest pain, palpitations or peripheral edema  GI: NEGATIVE for nausea, abdominal pain, heartburn, or change in bowel habits  : NEGATIVE for frequency, dysuria, hematuria, vaginal discharge  M: NEGATIVE for significant arthralgias or myalgia  N: NEGATIVE for weakness, dizziness or paresthesias  E: NEGATIVE for temperature intolerance, skin/hair changes  P: NEGATIVE for changes in mood or affect    EXAM:  Blood pressure (!) 140/90, pulse 76, temperature 98.6  F (37  C), resp. rate 16, height 1.632 m (5' 4.25\"), weight 121 kg (266 lb 12.8 oz), last menstrual period 10/24/2019, not currently breastfeeding.   BMI= Body mass index is 45.44 kg/m .  General - pleasant female in no acute distress.  Neck - supple without lymphadenopathy or thyromegaly.  Abdomen - soft, nontender, obese, nondistended, no hepatosplenomegaly.  Pelvic - EG: normal adult female,   BUS: within normal limits,   Vagina: well rugated, no discharge,   Cervix: no lesions or CMT, slightly increased girth  Uterus: firm, normal sized and nontender,   Adnexae: no masses or " tenderness.  Rectovaginal - deferred.  Musculoskeletal - no gross deformities.  Neurological - normal strength, sensation, and mental status.      ASSESSMENT/PLAN:  (N92.0) Menorrhagia with regular cycle  (primary encounter diagnosis)  Comment: (N94.6) Dysmenorrhea  long standing since menarche    (N88.9) Lesion of cervix  Comment: c/w fibroid  Plan: We reviewed the options and alternatives.  Discussed the use of oral contraceptives, injectables, implantable's, including IUD.  We reviewed hysteroscopy with D&C as well as endometrial ablation.  Lastly we discussed hysterectomy-laparoscopic due to her previous  section, bilateral salpingectomy with sparing of the ovaries and cystoscopy.      I described the procedures as indicated above. The types of anesthesia were reviewed. The pre and post-op expectations were noted. We discussed the risks and benefits of the above procedures. The risk of bleeding, infection and damage to other organs was reviewed. The possibility of much larger incision(s) was discussed.  No guarantees were made.  She did express understanding and desires to proceed with hysterectomy as reviewed above.            Letter will be sent to the referring provider.    Yash Welsh MD

## 2019-10-30 ENCOUNTER — PREP FOR PROCEDURE (OUTPATIENT)
Dept: OBGYN | Facility: CLINIC | Age: 39
End: 2019-10-30

## 2019-10-30 DIAGNOSIS — D25.1 INTRAMURAL LEIOMYOMA OF UTERUS: ICD-10-CM

## 2019-10-30 DIAGNOSIS — N94.6 DYSMENORRHEA: ICD-10-CM

## 2019-10-30 DIAGNOSIS — N92.0 MENORRHAGIA WITH REGULAR CYCLE: Primary | ICD-10-CM

## 2019-10-30 RX ORDER — OXYCODONE HCL 10 MG/1
10 TABLET, FILM COATED, EXTENDED RELEASE ORAL ONCE
Status: CANCELLED | OUTPATIENT
Start: 2019-10-30 | End: 2019-10-30

## 2019-10-30 RX ORDER — CEFAZOLIN SODIUM IN 0.9 % NACL 3 G/100 ML
3 INTRAVENOUS SOLUTION, PIGGYBACK (ML) INTRAVENOUS
Status: CANCELLED | OUTPATIENT
Start: 2019-10-30

## 2019-10-30 RX ORDER — PHENAZOPYRIDINE HYDROCHLORIDE 100 MG/1
200 TABLET, FILM COATED ORAL ONCE
Status: CANCELLED | OUTPATIENT
Start: 2019-10-30 | End: 2019-10-30

## 2019-10-30 RX ORDER — CEFAZOLIN SODIUM 1 G/50ML
1 INJECTION, SOLUTION INTRAVENOUS SEE ADMIN INSTRUCTIONS
Status: CANCELLED | OUTPATIENT
Start: 2019-10-30

## 2019-10-31 ENCOUNTER — TELEPHONE (OUTPATIENT)
Dept: OBGYN | Facility: CLINIC | Age: 39
End: 2019-10-31

## 2019-10-31 PROBLEM — N92.0 MENORRHAGIA WITH REGULAR CYCLE: Status: ACTIVE | Noted: 2019-10-31

## 2019-10-31 PROBLEM — D25.1 INTRAMURAL LEIOMYOMA OF UTERUS: Status: ACTIVE | Noted: 2019-10-31

## 2019-10-31 PROBLEM — N94.6 DYSMENORRHEA: Status: ACTIVE | Noted: 2019-10-31

## 2019-10-31 NOTE — TELEPHONE ENCOUNTER
"  You are now scheduled for surgery at [facility].  Below are the details for your surgery.  Please read the \"Preparing for Your Surgery\" instructions and let us know if you have any questions.    Type of surgery: HYSTERECTOMY, TOTAL, LAPAROSCOPIC, WITH SALPINGECTOMY (Bilateral)   Surgeon:  Yash Welsh MD  Location of surgery: [facility] OR    Date of surgery: 12-9-19    Time: 7:30am   Arrival Time: 6:00am    Time can change, to be confirmed a couple of days prior by pre-op surgery nurse.    Pre-Op Appt Date: Patient to schedule with a PCP or Family Practice Provider within 30 days to the surgery.  Post-Op Appt Date: To be determined by provider     Packet sent out: Yes  Pre-cert/Authorization completed:  TBD by Financial Securing Office.   MA Sterilization/Hysterectomy Acknowledgment Consent signed: Not Applicable    [facility]  [phone]    Fax: [phone]  Same Day Surgery [phone]  Fax: [phone]    "

## 2019-11-07 ENCOUNTER — HEALTH MAINTENANCE LETTER (OUTPATIENT)
Age: 39
End: 2019-11-07

## 2019-11-11 ENCOUNTER — TRANSFERRED RECORDS (OUTPATIENT)
Dept: HEALTH INFORMATION MANAGEMENT | Facility: CLINIC | Age: 39
End: 2019-11-11

## 2019-12-02 ENCOUNTER — OFFICE VISIT (OUTPATIENT)
Dept: FAMILY MEDICINE | Facility: CLINIC | Age: 39
End: 2019-12-02
Payer: COMMERCIAL

## 2019-12-02 ENCOUNTER — ANESTHESIA EVENT (OUTPATIENT)
Dept: SURGERY | Facility: CLINIC | Age: 39
End: 2019-12-02
Payer: COMMERCIAL

## 2019-12-02 VITALS
DIASTOLIC BLOOD PRESSURE: 76 MMHG | BODY MASS INDEX: 45.78 KG/M2 | SYSTOLIC BLOOD PRESSURE: 130 MMHG | WEIGHT: 268.8 LBS | HEART RATE: 72 BPM | RESPIRATION RATE: 20 BRPM | TEMPERATURE: 98.2 F

## 2019-12-02 DIAGNOSIS — N92.0 MENORRHAGIA WITH REGULAR CYCLE: ICD-10-CM

## 2019-12-02 DIAGNOSIS — Z01.818 PREOP GENERAL PHYSICAL EXAM: Primary | ICD-10-CM

## 2019-12-02 DIAGNOSIS — N83.201 RIGHT OVARIAN CYST: ICD-10-CM

## 2019-12-02 PROCEDURE — 99214 OFFICE O/P EST MOD 30 MIN: CPT | Performed by: NURSE PRACTITIONER

## 2019-12-02 ASSESSMENT — ENCOUNTER SYMPTOMS
POLYPHAGIA: 0
POLYDIPSIA: 0
NUMBNESS: 0
NAUSEA: 0
SHORTNESS OF BREATH: 0
ARTHRALGIAS: 0
CONSTIPATION: 0
DIFFICULTY URINATING: 0
LIGHT-HEADEDNESS: 0
FATIGUE: 0
ABDOMINAL DISTENTION: 0
WHEEZING: 0
VOMITING: 0
NERVOUS/ANXIOUS: 0
DYSPHORIC MOOD: 0
SLEEP DISTURBANCE: 0
FREQUENCY: 0
DIZZINESS: 0
ABDOMINAL PAIN: 0
CHEST TIGHTNESS: 0
SORE THROAT: 0
COUGH: 0
HEADACHES: 0
ACTIVITY CHANGE: 0
PALPITATIONS: 0
DIARRHEA: 0
RHINORRHEA: 0

## 2019-12-02 ASSESSMENT — PAIN SCALES - GENERAL: PAINLEVEL: NO PAIN (0)

## 2019-12-02 NOTE — PROGRESS NOTES
[facility]  [address] [address]  [phone]  Dept: [phone]    PRE-OP EVALUATION:  Today's date: 2019    Jennifer Valentin (: 1980) presents for pre-operative evaluation assessment as requested by Dr. Welsh.  She requires evaluation and anesthesia risk assessment prior to undergoing surgery/procedure for treatment of menorrhagia with regular cycle; dysmenorrhea; intramural leiomyoma of uterus .    Proposed Surgery/ Procedure: Hysterectomy, Total, Laparoscopic, with Salpingectomy   Date of Surgery/ Procedure: 19  Time of Surgery/ Procedure: 7:30 am  Hospital/Surgical Facility: [facility]  Primary Physician: Birgit Curtis  Type of Anesthesia Anticipated: General    Patient has a Health Care Directive or Living Will:  NO    1. NO - Do you have a history of heart attack, stroke, stent, bypass or surgery on an artery in the head, neck, heart or legs?  2. NO - Do you ever have any pain or discomfort in your chest?  3. NO - Do you have a history of  Heart Failure?  4. NO - Are you troubled by shortness of breath when: walking on the level, up a slight hill or at night?  5. NO - Do you currently have a cold, bronchitis or other respiratory infection?  6. NO - Do you have a cough, shortness of breath or wheezing?  7. NO - Do you sometimes get pains in the calves of your legs when you walk?  8. NO - Do you or anyone in your family have previous history of blood clots?  9. NO - Do you or does anyone in your family have a serious bleeding problem such as prolonged bleeding following surgeries or cuts?  10. NO - Have you ever had problems with anemia or been told to take iron pills?  11. NO - Have you had any abnormal blood loss such as black, tarry or bloody stools, or abnormal vaginal bleeding?  12. NO - Have you ever had a blood transfusion?  13. NO - Have you or any of your relatives ever had problems with  anesthesia?  14. NO - Do you have sleep apnea, excessive snoring or daytime drowsiness?  15. NO - Do you have any prosthetic heart valves?  16. NO - Do you have prosthetic joints?  17. NO - Is there any chance that you may be pregnant?      HPI:     HPI related to upcoming procedure: Hysterectomy due to heavy bleeding. Maybe having right ovary removed, will decide once is in there-has a cyst.  No previous problems with anesthesia.  No family history of anesthesia problems.      See problem list for active medical problems.  Problems all longstanding and stable, except as noted/documented.  See ROS for pertinent symptoms related to these conditions.      MEDICAL HISTORY:     Patient Active Problem List    Diagnosis Date Noted     Menorrhagia with regular cycle 10/31/2019     Priority: Medium     Added automatically from request for surgery [id number]       Dysmenorrhea 10/31/2019     Priority: Medium     Added automatically from request for surgery [id number]       Intramural leiomyoma of uterus 10/31/2019     Priority: Medium     Added automatically from request for surgery [id number]       Class 3 severe obesity due to excess calories without serious comorbidity with body mass index (BMI) of 45.0 to 49.9 in adult (H) 2019     Priority: Medium     Elevated LFTs 2018     Priority: Medium     Subclinical hypothyroidism 2015     Priority: Medium     Family history of thyroid disease 2015     Priority: Medium     Contraception -- current partner vasectomy 2014     Priority: Medium     CARDIOVASCULAR SCREENING; LDL GOAL LESS THAN 160 10/31/2010     Priority: Medium     Heavy period 2009     Priority: Medium     2013 tolerable.         Past Medical History:   Diagnosis Date     BACKACHE NOS 10/10/2006     Menarche age 11    cycles q mo x 6 d occasional cramps     Past Surgical History:   Procedure Laterality Date     C  DELIVERY ONLY       COLONOSCOPY  2014     Procedure: COLONOSCOPY;  Surgeon: Lyndon Stark MD;  Location: [facility]     LAPAROSCOPIC CHOLECYSTECTOMY WITH CHOLANGIOGRAMS  7/29/2014    Procedure: LAPAROSCOPIC CHOLECYSTECTOMY WITH CHOLANGIOGRAMS;  Surgeon: Julius Butt MD;  Location: [facility]     WRIST SURGERY Right      Current Outpatient Medications   Medication Sig Dispense Refill     levothyroxine (SYNTHROID/LEVOTHROID) 25 MCG tablet Take 1 tablet (25 mcg) by mouth daily 90 tablet 3     norethindrone-ethinyl estradiol (ORTHO-NOVUM 1-35 TAB,NORTREL 1-35 TAB) 1-35 MG-MCG tablet 1 tab 4 times per day x 1 day, 1 tab 3 times per day x 1 day, 1 tab twice per day x 1 day and then daily until done with the pill pack. (Patient not taking: Reported on 10/29/2019) 28 tablet 0     OTC products: None, except as noted above    Allergies   Allergen Reactions     Nka [No Known Allergies]       Latex Allergy: NO    Social History     Tobacco Use     Smoking status: Never Smoker     Smokeless tobacco: Never Used   Substance Use Topics     Alcohol use: No     History   Drug Use No       REVIEW OF SYSTEMS:   Review of Systems   Constitutional: Negative for activity change and fatigue.   HENT: Negative for ear pain, rhinorrhea and sore throat.    Respiratory: Negative for cough, chest tightness, shortness of breath and wheezing.    Cardiovascular: Negative for chest pain, palpitations and leg swelling.   Gastrointestinal: Negative for abdominal distention, abdominal pain, constipation, diarrhea, nausea and vomiting.   Endocrine: Negative for cold intolerance, heat intolerance, polydipsia, polyphagia and polyuria.   Genitourinary: Negative for difficulty urinating, enuresis, frequency and urgency.   Musculoskeletal: Negative for arthralgias.   Skin: Negative for rash.   Neurological: Negative for dizziness, light-headedness, numbness and headaches.   Psychiatric/Behavioral: Negative for dysphoric mood and sleep disturbance. The patient is not nervous/anxious.          EXAM:    /76 (BP Location: Left arm, Patient Position: Sitting, Cuff Size: Adult Large)   Pulse 72   Temp 98.2  F (36.8  C) (Tympanic)   Resp 20   Wt 121.9 kg (268 lb 12.8 oz)   LMP 11/20/2019   BMI 45.78 kg/m    Physical Exam  Vitals signs reviewed.   Constitutional:       Appearance: Normal appearance. She is well-developed and well-groomed.   HENT:      Head: Normocephalic and atraumatic.      Jaw: There is normal jaw occlusion.      Right Ear: Ear canal and external ear normal. No middle ear effusion. Tympanic membrane is scarred. Tympanic membrane is not erythematous or bulging.      Left Ear: Tympanic membrane, ear canal and external ear normal.  No middle ear effusion.      Nose: Nose normal. No mucosal edema.      Mouth/Throat:      Lips: Pink.      Mouth: Mucous membranes are moist.      Pharynx: Oropharynx is clear. Uvula midline.   Eyes:      General: Lids are normal.      Extraocular Movements: Extraocular movements intact.      Pupils: Pupils are equal, round, and reactive to light.   Neck:      Musculoskeletal: Full passive range of motion without pain and normal range of motion.      Thyroid: No thyromegaly or thyroid tenderness.      Vascular: No carotid bruit.      Trachea: Trachea normal.   Cardiovascular:      Rate and Rhythm: Normal rate and regular rhythm.      Pulses:           Radial pulses are 2+ on the right side and 2+ on the left side.        Femoral pulses are 2+ on the right side and 2+ on the left side.       Posterior tibial pulses are 2+ on the right side and 2+ on the left side.      Heart sounds: Normal heart sounds, S1 normal and S2 normal.   Pulmonary:      Effort: Pulmonary effort is normal.      Breath sounds: Normal breath sounds and air entry.   Abdominal:      General: Bowel sounds are decreased.      Palpations: Abdomen is soft.      Tenderness: There is no abdominal tenderness.   Musculoskeletal: Normal range of motion.      Right lower leg: No edema.      Left lower  leg: No edema.   Lymphadenopathy:      Cervical: No cervical adenopathy.   Skin:     General: Skin is warm and dry.      Capillary Refill: Capillary refill takes less than 2 seconds.      Findings: No rash.   Neurological:      General: No focal deficit present.      Mental Status: She is alert.   Psychiatric:         Attention and Perception: Attention normal.         Mood and Affect: Mood normal.         Speech: Speech normal.         Behavior: Behavior normal. Behavior is cooperative.           DIAGNOSTICS:   No labs or EKG required for low risk surgery (cataract, skin procedure, breast biopsy, etc)  Recent labs reviewed and unremarkable.    Recent Labs   Lab Test 10/09/19  0958 09/27/19  1640 02/13/17  0836   HGB 14.0 13.4 14.6    268 245   NA  --  136 136   POTASSIUM  --  4.1 4.3   CR  --  0.56 0.70        IMPRESSION:   Reason for surgery/procedure: Heavy menstrual bleeding   Diagnosis/reason for consult: Optimization.     The proposed surgical procedure is considered INTERMEDIATE risk.    REVISED CARDIAC RISK INDEX  The patient has the following serious cardiovascular risks for perioperative complications such as (MI, PE, VFib and 3  AV Block):  No serious cardiac risks  INTERPRETATION: 0 risks: Class I (very low risk - 0.4% complication rate)    The patient has the following additional risks for perioperative complications:  No identified additional risks  Morbid obesity      ICD-10-CM    1. Preop general physical exam Z01.818    2. Menorrhagia with regular cycle N92.0    3. Right ovarian cyst N83.201        RECOMMENDATIONS:       --Patient is to take all scheduled medications on the day of surgery EXCEPT for modifications listed below.    APPROVAL GIVEN to proceed with proposed procedure, without further diagnostic evaluation       Signed Electronically by: NEO Stone CNP    Copy of this evaluation report is provided to requesting physician.    [facility] Preop Guidelines    Revised Cardiac  Risk Index

## 2019-12-02 NOTE — PATIENT INSTRUCTIONS
You may take you med the AM of your surgery.     Before Your Surgery      Call your surgeon if there is any change in your health. This includes signs of a cold or flu (such as a sore throat, runny nose, cough, rash or fever).    Do not smoke, drink alcohol or take over the counter medicine (unless your surgeon or primary care doctor tells you to) for the 24 hours before and after surgery.    If you take prescribed drugs: Follow your doctor s orders about which medicines to take and which to stop until after surgery.    Eating and drinking prior to surgery: follow the instructions from your surgeon    Take a shower or bath the night before surgery. Use the soap your surgeon gave you to gently clean your skin. If you do not have soap from your surgeon, use your regular soap. Do not shave or scrub the surgery site.  Wear clean pajamas and have clean sheets on your bed.

## 2019-12-06 NOTE — ANESTHESIA PREPROCEDURE EVALUATION
Anesthesia Pre-Procedure Evaluation    Patient: Jennifer Valentin   MRN: [id number] : 1980          Preoperative Diagnosis: Menorrhagia with regular cycle [N92.0]  Dysmenorrhea [N94.6]  Intramural leiomyoma of uterus [D25.1]    Procedure(s):  HYSTERECTOMY, TOTAL, LAPAROSCOPIC, WITH SALPINGECTOMY    Past Medical History:   Diagnosis Date     BACKACHE NOS 10/10/2006     Menarche age 11    cycles q mo x 6 d occasional cramps     Past Surgical History:   Procedure Laterality Date     C  DELIVERY ONLY       COLONOSCOPY  2014    Procedure: COLONOSCOPY;  Surgeon: Lyndon Stark MD;  Location: [facility]     LAPAROSCOPIC CHOLECYSTECTOMY WITH CHOLANGIOGRAMS  2014    Procedure: LAPAROSCOPIC CHOLECYSTECTOMY WITH CHOLANGIOGRAMS;  Surgeon: Julius Butt MD;  Location: [facility]     [facility] SURGERY Right        Anesthesia Evaluation     .             ROS/MED HX    ENT/Pulmonary:     (+)LINDA risk factors obese, , . .    Neurologic:       Cardiovascular:         METS/Exercise Tolerance:     Hematologic:         Musculoskeletal:   (+)  other musculoskeletal- LBP      GI/Hepatic:     (+) Other GI/Hepatic elevated LFT's      Renal/Genitourinary:         Endo:     (+) thyroid problem hypothyroidism, Obesity, .      Psychiatric:         Infectious Disease:   (+) Recent Fever,       Malignancy:         Other: Comment: Menorrhagia  Dysmenorrhea  Leiomyoma of uterus                         Physical Exam  Normal systems: cardiovascular, pulmonary and dental    Airway   Mallampati: I  TM distance: >3 FB  Neck ROM: full    Dental     Cardiovascular   Rhythm and rate: regular and normal      Pulmonary    breath sounds clear to auscultation            Lab Results   Component Value Date    WBC 7.2 10/09/2019    HGB 14.0 10/09/2019    HCT 42.3 10/09/2019     10/09/2019     2019    POTASSIUM 4.1 2019    CHLORIDE 104 2019    CO2 27 2019    BUN 10 2019    CR 0.56 2019    GLC  "100 (H) 09/27/2019    GLEN 8.9 09/27/2019    ALBUMIN 3.4 02/13/2017    PROTTOTAL 7.5 02/13/2017    ALT 28 02/05/2018    AST 23 02/05/2018    ALKPHOS 121 02/13/2017    BILITOTAL 1.2 02/13/2017    LIPASE 90 07/31/2014    TSH 2.09 10/09/2019    T4 0.98 02/13/2017    HCG Negative 09/27/2019       Preop Vitals  BP Readings from Last 3 Encounters:   12/02/19 130/76   10/29/19 (!) 140/90   10/09/19 122/72    Pulse Readings from Last 3 Encounters:   12/02/19 72   10/29/19 76   10/09/19 79      Resp Readings from Last 3 Encounters:   12/02/19 20   10/29/19 16   10/09/19 18    SpO2 Readings from Last 3 Encounters:   10/09/19 96%   12/08/17 98%   03/30/17 99%      Temp Readings from Last 1 Encounters:   12/02/19 36.8  C (98.2  F) (Tympanic)    Ht Readings from Last 1 Encounters:   10/29/19 1.632 m (5' 4.25\")      Wt Readings from Last 1 Encounters:   12/02/19 121.9 kg (268 lb 12.8 oz)    Estimated body mass index is 45.78 kg/m  as calculated from the following:    Height as of 10/29/19: 1.632 m (5' 4.25\").    Weight as of 12/2/19: 121.9 kg (268 lb 12.8 oz).       Anesthesia Plan      History & Physical Review  History and physical reviewed and following examination; no interval change.    ASA Status:  2 .    NPO Status:  > 6 hours    Plan for General and ETT with Intravenous induction. Maintenance will be Balanced.    PONV prophylaxis:  Ondansetron (or other 5HT-3), Dexamethasone or Solumedrol and Scopolamine patch       Postoperative Care  Postoperative pain management:  IV analgesics and Oral pain medications.      Consents  Anesthetic plan, risks, benefits and alternatives discussed with:  Patient..                 NEO Kim CRNA  "

## 2019-12-09 ENCOUNTER — HOSPITAL ENCOUNTER (OUTPATIENT)
Facility: CLINIC | Age: 39
Discharge: HOME | End: 2019-12-09
Attending: OBSTETRICS & GYNECOLOGY | Admitting: OBSTETRICS & GYNECOLOGY
Payer: COMMERCIAL

## 2019-12-09 ENCOUNTER — ANESTHESIA (OUTPATIENT)
Dept: SURGERY | Facility: CLINIC | Age: 39
End: 2019-12-09
Payer: COMMERCIAL

## 2019-12-09 VITALS
DIASTOLIC BLOOD PRESSURE: 66 MMHG | HEIGHT: 64 IN | WEIGHT: 270 LBS | BODY MASS INDEX: 46.1 KG/M2 | TEMPERATURE: 98.4 F | HEART RATE: 92 BPM | RESPIRATION RATE: 16 BRPM | SYSTOLIC BLOOD PRESSURE: 112 MMHG | OXYGEN SATURATION: 95 %

## 2019-12-09 DIAGNOSIS — Z90.710 STATUS POST LAPAROSCOPIC HYSTERECTOMY: Primary | ICD-10-CM

## 2019-12-09 DIAGNOSIS — N92.0 MENORRHAGIA WITH REGULAR CYCLE: ICD-10-CM

## 2019-12-09 DIAGNOSIS — D25.1 INTRAMURAL LEIOMYOMA OF UTERUS: ICD-10-CM

## 2019-12-09 DIAGNOSIS — N94.6 DYSMENORRHEA: ICD-10-CM

## 2019-12-09 LAB — HCG UR QL: NEGATIVE

## 2019-12-09 PROCEDURE — 58571 TLH W/T/O 250 G OR LESS: CPT | Mod: AS | Performed by: PHYSICIAN ASSISTANT

## 2019-12-09 PROCEDURE — 36000093 ZZH SURGERY LEVEL 4 1ST 30 MIN: Performed by: OBSTETRICS & GYNECOLOGY

## 2019-12-09 PROCEDURE — 37000008 ZZH ANESTHESIA TECHNICAL FEE, 1ST 30 MIN: Performed by: OBSTETRICS & GYNECOLOGY

## 2019-12-09 PROCEDURE — 36000063 ZZH SURGERY LEVEL 4 EA 15 ADDTL MIN: Performed by: OBSTETRICS & GYNECOLOGY

## 2019-12-09 PROCEDURE — 25000132 ZZH RX MED GY IP 250 OP 250 PS 637: Performed by: NURSE ANESTHETIST, CERTIFIED REGISTERED

## 2019-12-09 PROCEDURE — 25000125 ZZHC RX 250: Performed by: OBSTETRICS & GYNECOLOGY

## 2019-12-09 PROCEDURE — 25800030 ZZH RX IP 258 OP 636: Performed by: NURSE ANESTHETIST, CERTIFIED REGISTERED

## 2019-12-09 PROCEDURE — 81025 URINE PREGNANCY TEST: CPT | Performed by: NURSE ANESTHETIST, CERTIFIED REGISTERED

## 2019-12-09 PROCEDURE — 25000128 H RX IP 250 OP 636: Performed by: NURSE ANESTHETIST, CERTIFIED REGISTERED

## 2019-12-09 PROCEDURE — 88305 TISSUE EXAM BY PATHOLOGIST: CPT | Performed by: OBSTETRICS & GYNECOLOGY

## 2019-12-09 PROCEDURE — 58571 TLH W/T/O 250 G OR LESS: CPT | Performed by: OBSTETRICS & GYNECOLOGY

## 2019-12-09 PROCEDURE — 40000306 ZZH STATISTIC PRE PROC ASSESS II: Performed by: OBSTETRICS & GYNECOLOGY

## 2019-12-09 PROCEDURE — 88305 TISSUE EXAM BY PATHOLOGIST: CPT | Mod: 26 | Performed by: OBSTETRICS & GYNECOLOGY

## 2019-12-09 PROCEDURE — 25800025 ZZH RX 258: Performed by: OBSTETRICS & GYNECOLOGY

## 2019-12-09 PROCEDURE — 27210794 ZZH OR GENERAL SUPPLY STERILE: Performed by: OBSTETRICS & GYNECOLOGY

## 2019-12-09 PROCEDURE — 37000009 ZZH ANESTHESIA TECHNICAL FEE, EACH ADDTL 15 MIN: Performed by: OBSTETRICS & GYNECOLOGY

## 2019-12-09 PROCEDURE — 88307 TISSUE EXAM BY PATHOLOGIST: CPT | Mod: 26 | Performed by: OBSTETRICS & GYNECOLOGY

## 2019-12-09 PROCEDURE — 25000128 H RX IP 250 OP 636: Performed by: OBSTETRICS & GYNECOLOGY

## 2019-12-09 PROCEDURE — 25000132 ZZH RX MED GY IP 250 OP 250 PS 637: Performed by: OBSTETRICS & GYNECOLOGY

## 2019-12-09 PROCEDURE — 27110028 ZZH OR GENERAL SUPPLY NON-STERILE: Performed by: OBSTETRICS & GYNECOLOGY

## 2019-12-09 PROCEDURE — 71000013 ZZH RECOVERY PHASE 1 LEVEL 1 EA ADDTL HR: Performed by: OBSTETRICS & GYNECOLOGY

## 2019-12-09 PROCEDURE — 71000027 ZZH RECOVERY PHASE 2 EACH 15 MINS: Performed by: OBSTETRICS & GYNECOLOGY

## 2019-12-09 PROCEDURE — 25000566 ZZH SEVOFLURANE, EA 15 MIN: Performed by: OBSTETRICS & GYNECOLOGY

## 2019-12-09 PROCEDURE — 71000012 ZZH RECOVERY PHASE 1 LEVEL 1 FIRST HR: Performed by: OBSTETRICS & GYNECOLOGY

## 2019-12-09 PROCEDURE — 25000125 ZZHC RX 250: Performed by: NURSE ANESTHETIST, CERTIFIED REGISTERED

## 2019-12-09 PROCEDURE — 88307 TISSUE EXAM BY PATHOLOGIST: CPT | Performed by: OBSTETRICS & GYNECOLOGY

## 2019-12-09 RX ORDER — BUPIVACAINE HYDROCHLORIDE AND EPINEPHRINE 5; 5 MG/ML; UG/ML
INJECTION, SOLUTION PERINEURAL PRN
Status: DISCONTINUED | OUTPATIENT
Start: 2019-12-09 | End: 2019-12-09 | Stop reason: HOSPADM

## 2019-12-09 RX ORDER — ONDANSETRON 4 MG/1
4 TABLET, ORALLY DISINTEGRATING ORAL EVERY 30 MIN PRN
Status: DISCONTINUED | OUTPATIENT
Start: 2019-12-09 | End: 2019-12-09 | Stop reason: HOSPADM

## 2019-12-09 RX ORDER — FENTANYL CITRATE 50 UG/ML
25-50 INJECTION, SOLUTION INTRAMUSCULAR; INTRAVENOUS
Status: DISCONTINUED | OUTPATIENT
Start: 2019-12-09 | End: 2019-12-09 | Stop reason: HOSPADM

## 2019-12-09 RX ORDER — BUPIVACAINE HYDROCHLORIDE 2.5 MG/ML
INJECTION, SOLUTION INFILTRATION; PERINEURAL PRN
Status: DISCONTINUED | OUTPATIENT
Start: 2019-12-09 | End: 2019-12-09 | Stop reason: HOSPADM

## 2019-12-09 RX ORDER — FENTANYL CITRATE 50 UG/ML
INJECTION, SOLUTION INTRAMUSCULAR; INTRAVENOUS PRN
Status: DISCONTINUED | OUTPATIENT
Start: 2019-12-09 | End: 2019-12-09

## 2019-12-09 RX ORDER — ONDANSETRON 2 MG/ML
4 INJECTION INTRAMUSCULAR; INTRAVENOUS EVERY 30 MIN PRN
Status: DISCONTINUED | OUTPATIENT
Start: 2019-12-09 | End: 2019-12-09 | Stop reason: HOSPADM

## 2019-12-09 RX ORDER — OXYCODONE HYDROCHLORIDE 5 MG/1
5 TABLET ORAL
Status: DISCONTINUED | OUTPATIENT
Start: 2019-12-09 | End: 2019-12-09 | Stop reason: HOSPADM

## 2019-12-09 RX ORDER — OXYCODONE HYDROCHLORIDE 5 MG/1
5-10 TABLET ORAL EVERY 4 HOURS PRN
Qty: 20 TABLET | Refills: 0 | Status: SHIPPED | OUTPATIENT
Start: 2019-12-09 | End: 2019-12-30

## 2019-12-09 RX ORDER — CEFAZOLIN SODIUM IN 0.9 % NACL 3 G/100 ML
3 INTRAVENOUS SOLUTION, PIGGYBACK (ML) INTRAVENOUS
Status: COMPLETED | OUTPATIENT
Start: 2019-12-09 | End: 2019-12-09

## 2019-12-09 RX ORDER — IBUPROFEN 200 MG
600 TABLET ORAL EVERY 6 HOURS PRN
COMMUNITY
Start: 2019-12-09 | End: 2019-12-30

## 2019-12-09 RX ORDER — DEXAMETHASONE SODIUM PHOSPHATE 4 MG/ML
INJECTION, SOLUTION INTRA-ARTICULAR; INTRALESIONAL; INTRAMUSCULAR; INTRAVENOUS; SOFT TISSUE PRN
Status: DISCONTINUED | OUTPATIENT
Start: 2019-12-09 | End: 2019-12-09

## 2019-12-09 RX ORDER — SCOLOPAMINE TRANSDERMAL SYSTEM 1 MG/1
1 PATCH, EXTENDED RELEASE TRANSDERMAL ONCE
Status: COMPLETED | OUTPATIENT
Start: 2019-12-09 | End: 2019-12-09

## 2019-12-09 RX ORDER — DEXAMETHASONE SODIUM PHOSPHATE 4 MG/ML
4 INJECTION, SOLUTION INTRA-ARTICULAR; INTRALESIONAL; INTRAMUSCULAR; INTRAVENOUS; SOFT TISSUE EVERY 10 MIN PRN
Status: DISCONTINUED | OUTPATIENT
Start: 2019-12-09 | End: 2019-12-09 | Stop reason: HOSPADM

## 2019-12-09 RX ORDER — SODIUM CHLORIDE, SODIUM LACTATE, POTASSIUM CHLORIDE, CALCIUM CHLORIDE 600; 310; 30; 20 MG/100ML; MG/100ML; MG/100ML; MG/100ML
INJECTION, SOLUTION INTRAVENOUS CONTINUOUS
Status: DISCONTINUED | OUTPATIENT
Start: 2019-12-09 | End: 2019-12-09 | Stop reason: HOSPADM

## 2019-12-09 RX ORDER — NALOXONE HYDROCHLORIDE 0.4 MG/ML
.1-.4 INJECTION, SOLUTION INTRAMUSCULAR; INTRAVENOUS; SUBCUTANEOUS
Status: DISCONTINUED | OUTPATIENT
Start: 2019-12-09 | End: 2019-12-09 | Stop reason: HOSPADM

## 2019-12-09 RX ORDER — LIDOCAINE 40 MG/G
CREAM TOPICAL
Status: DISCONTINUED | OUTPATIENT
Start: 2019-12-09 | End: 2019-12-09 | Stop reason: HOSPADM

## 2019-12-09 RX ORDER — ACETAMINOPHEN 325 MG/1
975 TABLET ORAL ONCE
Status: COMPLETED | OUTPATIENT
Start: 2019-12-09 | End: 2019-12-09

## 2019-12-09 RX ORDER — HYDROXYZINE HYDROCHLORIDE 50 MG/1
50 TABLET, FILM COATED ORAL EVERY 6 HOURS PRN
Status: DISCONTINUED | OUTPATIENT
Start: 2019-12-09 | End: 2019-12-09 | Stop reason: HOSPADM

## 2019-12-09 RX ORDER — HYDROXYZINE PAMOATE 25 MG/1
25 CAPSULE ORAL 4 TIMES DAILY PRN
Qty: 20 CAPSULE | Refills: 0 | Status: SHIPPED | OUTPATIENT
Start: 2019-12-09 | End: 2019-12-30

## 2019-12-09 RX ORDER — HYDROXYZINE HYDROCHLORIDE 25 MG/1
25 TABLET, FILM COATED ORAL
Status: DISCONTINUED | OUTPATIENT
Start: 2019-12-09 | End: 2019-12-09 | Stop reason: HOSPADM

## 2019-12-09 RX ORDER — ALBUTEROL SULFATE 0.83 MG/ML
2.5 SOLUTION RESPIRATORY (INHALATION) EVERY 4 HOURS PRN
Status: DISCONTINUED | OUTPATIENT
Start: 2019-12-09 | End: 2019-12-09 | Stop reason: HOSPADM

## 2019-12-09 RX ORDER — METOCLOPRAMIDE HYDROCHLORIDE 5 MG/ML
10 INJECTION INTRAMUSCULAR; INTRAVENOUS EVERY 6 HOURS PRN
Status: DISCONTINUED | OUTPATIENT
Start: 2019-12-09 | End: 2019-12-09 | Stop reason: HOSPADM

## 2019-12-09 RX ORDER — METOCLOPRAMIDE 10 MG/1
10 TABLET ORAL EVERY 6 HOURS PRN
Status: DISCONTINUED | OUTPATIENT
Start: 2019-12-09 | End: 2019-12-09 | Stop reason: HOSPADM

## 2019-12-09 RX ORDER — MEPERIDINE HYDROCHLORIDE 25 MG/ML
12.5 INJECTION INTRAMUSCULAR; INTRAVENOUS; SUBCUTANEOUS
Status: DISCONTINUED | OUTPATIENT
Start: 2019-12-09 | End: 2019-12-09 | Stop reason: HOSPADM

## 2019-12-09 RX ORDER — PROPOFOL 10 MG/ML
INJECTION, EMULSION INTRAVENOUS PRN
Status: DISCONTINUED | OUTPATIENT
Start: 2019-12-09 | End: 2019-12-09

## 2019-12-09 RX ORDER — PHENAZOPYRIDINE HYDROCHLORIDE 200 MG/1
200 TABLET, FILM COATED ORAL ONCE
Status: COMPLETED | OUTPATIENT
Start: 2019-12-09 | End: 2019-12-09

## 2019-12-09 RX ORDER — GABAPENTIN 300 MG/1
300 CAPSULE ORAL ONCE
Status: COMPLETED | OUTPATIENT
Start: 2019-12-09 | End: 2019-12-09

## 2019-12-09 RX ORDER — HYDROMORPHONE HYDROCHLORIDE 1 MG/ML
.3-.5 INJECTION, SOLUTION INTRAMUSCULAR; INTRAVENOUS; SUBCUTANEOUS EVERY 10 MIN PRN
Status: DISCONTINUED | OUTPATIENT
Start: 2019-12-09 | End: 2019-12-09 | Stop reason: HOSPADM

## 2019-12-09 RX ORDER — HYDROXYZINE HYDROCHLORIDE 25 MG/1
25 TABLET, FILM COATED ORAL EVERY 6 HOURS PRN
Status: DISCONTINUED | OUTPATIENT
Start: 2019-12-09 | End: 2019-12-09 | Stop reason: HOSPADM

## 2019-12-09 RX ORDER — OXYCODONE HCL 10 MG/1
10 TABLET, FILM COATED, EXTENDED RELEASE ORAL ONCE
Status: COMPLETED | OUTPATIENT
Start: 2019-12-09 | End: 2019-12-09

## 2019-12-09 RX ORDER — CEFAZOLIN SODIUM 1 G/50ML
1 INJECTION, SOLUTION INTRAVENOUS SEE ADMIN INSTRUCTIONS
Status: DISCONTINUED | OUTPATIENT
Start: 2019-12-09 | End: 2019-12-09 | Stop reason: HOSPADM

## 2019-12-09 RX ORDER — IBUPROFEN 600 MG/1
600 TABLET, FILM COATED ORAL
Status: DISCONTINUED | OUTPATIENT
Start: 2019-12-09 | End: 2019-12-09 | Stop reason: HOSPADM

## 2019-12-09 RX ORDER — ONDANSETRON 2 MG/ML
INJECTION INTRAMUSCULAR; INTRAVENOUS PRN
Status: DISCONTINUED | OUTPATIENT
Start: 2019-12-09 | End: 2019-12-09

## 2019-12-09 RX ORDER — KETOROLAC TROMETHAMINE 30 MG/ML
30 INJECTION, SOLUTION INTRAMUSCULAR; INTRAVENOUS EVERY 6 HOURS PRN
Status: DISCONTINUED | OUTPATIENT
Start: 2019-12-09 | End: 2019-12-09 | Stop reason: HOSPADM

## 2019-12-09 RX ADMIN — Medication 3 G: at 07:45

## 2019-12-09 RX ADMIN — PROPOFOL 50 MG: 10 INJECTION, EMULSION INTRAVENOUS at 08:25

## 2019-12-09 RX ADMIN — OXYCODONE HYDROCHLORIDE 10 MG: 10 TABLET, FILM COATED, EXTENDED RELEASE ORAL at 06:41

## 2019-12-09 RX ADMIN — ROCURONIUM BROMIDE 50 MG: 10 INJECTION INTRAVENOUS at 07:35

## 2019-12-09 RX ADMIN — PROPOFOL 100 MG: 10 INJECTION, EMULSION INTRAVENOUS at 07:36

## 2019-12-09 RX ADMIN — PROPOFOL 50 MG: 10 INJECTION, EMULSION INTRAVENOUS at 08:05

## 2019-12-09 RX ADMIN — PROPOFOL 50 MG: 10 INJECTION, EMULSION INTRAVENOUS at 08:15

## 2019-12-09 RX ADMIN — PHENAZOPYRIDINE HYDROCHLORIDE 200 MG: 200 TABLET, FILM COATED ORAL at 06:41

## 2019-12-09 RX ADMIN — FENTANYL CITRATE 150 MCG: 50 INJECTION, SOLUTION INTRAMUSCULAR; INTRAVENOUS at 08:00

## 2019-12-09 RX ADMIN — ACETAMINOPHEN 975 MG: 325 TABLET, FILM COATED ORAL at 06:41

## 2019-12-09 RX ADMIN — MIDAZOLAM HYDROCHLORIDE 2 MG: 1 INJECTION, SOLUTION INTRAMUSCULAR; INTRAVENOUS at 07:30

## 2019-12-09 RX ADMIN — ONDANSETRON 4 MG: 2 INJECTION INTRAMUSCULAR; INTRAVENOUS at 07:35

## 2019-12-09 RX ADMIN — PROPOFOL 50 MG: 10 INJECTION, EMULSION INTRAVENOUS at 08:20

## 2019-12-09 RX ADMIN — FENTANYL CITRATE 100 MCG: 50 INJECTION, SOLUTION INTRAMUSCULAR; INTRAVENOUS at 07:35

## 2019-12-09 RX ADMIN — SODIUM CHLORIDE, POTASSIUM CHLORIDE, SODIUM LACTATE AND CALCIUM CHLORIDE: 600; 310; 30; 20 INJECTION, SOLUTION INTRAVENOUS at 06:42

## 2019-12-09 RX ADMIN — FENTANYL CITRATE 100 MCG: 50 INJECTION, SOLUTION INTRAMUSCULAR; INTRAVENOUS at 07:49

## 2019-12-09 RX ADMIN — FENTANYL CITRATE 100 MCG: 50 INJECTION, SOLUTION INTRAMUSCULAR; INTRAVENOUS at 09:32

## 2019-12-09 RX ADMIN — SODIUM CHLORIDE, POTASSIUM CHLORIDE, SODIUM LACTATE AND CALCIUM CHLORIDE: 600; 310; 30; 20 INJECTION, SOLUTION INTRAVENOUS at 08:55

## 2019-12-09 RX ADMIN — GABAPENTIN 300 MG: 300 CAPSULE ORAL at 06:41

## 2019-12-09 RX ADMIN — PROPOFOL 50 MG: 10 INJECTION, EMULSION INTRAVENOUS at 07:55

## 2019-12-09 RX ADMIN — MIDAZOLAM HYDROCHLORIDE 2 MG: 1 INJECTION, SOLUTION INTRAMUSCULAR; INTRAVENOUS at 07:33

## 2019-12-09 RX ADMIN — PROPOFOL 50 MG: 10 INJECTION, EMULSION INTRAVENOUS at 08:30

## 2019-12-09 RX ADMIN — Medication 1 G: at 09:45

## 2019-12-09 RX ADMIN — PROPOFOL 50 MG: 10 INJECTION, EMULSION INTRAVENOUS at 08:10

## 2019-12-09 RX ADMIN — KETOROLAC TROMETHAMINE 30 MG: 30 INJECTION, SOLUTION INTRAMUSCULAR at 10:49

## 2019-12-09 RX ADMIN — MIDAZOLAM HYDROCHLORIDE 1 MG: 1 INJECTION, SOLUTION INTRAMUSCULAR; INTRAVENOUS at 07:35

## 2019-12-09 RX ADMIN — PROPOFOL 50 MG: 10 INJECTION, EMULSION INTRAVENOUS at 08:00

## 2019-12-09 RX ADMIN — SUGAMMADEX 200 MG: 100 INJECTION, SOLUTION INTRAVENOUS at 09:29

## 2019-12-09 RX ADMIN — DEXAMETHASONE SODIUM PHOSPHATE 10 MG: 4 INJECTION, SOLUTION INTRA-ARTICULAR; INTRALESIONAL; INTRAMUSCULAR; INTRAVENOUS; SOFT TISSUE at 07:35

## 2019-12-09 RX ADMIN — SCOLOPAMINE TRANSDERMAL SYSTEM 1 PATCH: 1 PATCH, EXTENDED RELEASE TRANSDERMAL at 07:30

## 2019-12-09 RX ADMIN — FENTANYL CITRATE 150 MCG: 50 INJECTION, SOLUTION INTRAMUSCULAR; INTRAVENOUS at 07:31

## 2019-12-09 ASSESSMENT — MIFFLIN-ST. JEOR: SCORE: 1884.71

## 2019-12-09 NOTE — OP NOTE
[facility] Gynecology  Operative Note    Pre-operative diagnosis: Menorrhagia with regular cycle [N92.0]  Dysmenorrhea [N94.6]  Intramural leiomyoma of uterus [D25.1]   Post-operative diagnosis: Same   Procedure: Laparoscopic Total hysterectomy  Bilateral salpingectomy  cystoscopy   Surgeon: Yash Welsh MD   Assistant(s): FAZAL Mondragon PA-C   A surgical assistant was required for this surgery for his experience with retraction, achievement of hemostasis, and wound closure     Anesthesia: General Endotracheal Anesthesia   Estimated blood loss: 25 ml   Total IV fluids: (See anesthesia record)  1200 ml   Blood transfusion: No transfusion was given during surgery   Total urine output: (See anesthesia record)  140 ml   Drains: None   Specimens: Uterus, fallopian tubes  Vaginal nodule   Findings: Fibroid uterus , normal ovaries and fallopian tubes  Solid vaginal nodule 1 cm  Normal appendix, bladder mucosa and bilateral ureteral efflux   Complications: None   Condition: Stable   Comments: Jennifer Valentin   1980  [id number]      Jennifer Valentin  presented for the above procedure.  She has heavy and painful periods , is s/p  section x 1  I met with Jennifer  and her , Joseph and discussed the planned procedure as well as the expected post operative course.  Risks of complications were noted and postoperative signs to watch for outlined.  Questions were answered and consent signed.  She was taken to the OR @ [facility] where she was placed in the supine position. She underwent General anesthesia with endotracheal intubation.  She was then placed in the Dorso-lithotomy position in [facility].  An examination under anesthesia was performed that showed: a multiparous uterus , and a vaginal nodule @ 5:00    She was prepped and draped.  A timeout was held confirming her identity and proposed procedures. All were in agreement.     A speculum was placed in the vagina and the cervix was  isolated.  This was grasped with a single-tooth tenaculum.  Uterus was sounded to 8 cm and the cervix measured 3.5 cm in diameter.  Examination under anesthesia revealed a nodule at 5:00 in the fornix and it was felt that, this nodule being small, would fit into the ring and be removed upon completion of the hysterectomy.  A Dorota 3.5 cm ring and 8 cm obturator were used.  This was placed into the uterus through the cervix and the balloon, while being expanded with saline push the obturator out of the uterus.  Repeat injection of the saline in the balloon because of wound rupture.  The Dorota was then abandoned for a .  Delineated was placed and secured with the balloon without any difficulties.  The speculum and tenaculum were removed.  A Shaffer cath was placed into the bladder to straight drainage the urine was stained with Pyridium.    Attention was then turned to the abdomen.  Each abdominal incision was Kaya with 0.25% Marcaine.  Umbilical incision was made the Veress needle was placed countertraction provided by placing towel clips either side of the umbilicus at 10 and 2:00.  The opening pressure was 5 mmHg.  3.2 L of CO2 were insufflated the Veress needle was removed and a 5 mm trocar cannula placed without difficulty.  Placement documented visually.    Two, 5 mm ports were placed, one each of the  lower quadrants under direct vision.  An 8 mm port was placed in the suprapubic area also under direct vision.    Findings are as noted the gallbladder is surgically absent.  The appendix is normal in appearance.  The uterus was multiparous in size with a subserosal fibroid of approximately 2.5cm near the right cornual.  The ovaries are normal in appearance.  The fallopian tubes are normal as well.  Cul-de-sac was free of lesions or adhesions.  There was an adhesion of the bladder peritoneum to the lower uterine segment.  The bladder mucosa was normal in appearance and subsequent cystoscopy showed brisk  ureteral flow at the completion of the procedure.  The course of the ureters was clearly identified early in the case.    Using the thunder beat 5 mm system, the distal fallopian tubes were elevated from the ovaries and that attachment transected.  There is linear transection of the mesosalpinx up to the cornu.  The ovarian ligaments were then crossclamped cauterized and transected as well as the round ligaments bilaterally.  Dissection was carried down the broad ligament  the anterior and posterior leaves down to the lower uterine segment and cardinal ligament where a bladder flap was begun bilaterally.  This was carried across the cervix.  The ascending vessels then crossclamped cauterized and transected.  This done bilaterally.  The bladder flap was completed.  The colpotomy incision was made using the thunder beat in a circumferential fashion with close attention paid to the nearby bowel.  Once uterus was completely freed from the vagina was removed through the vagina without difficulty.  Palpation of the vaginal cuff vaginally showed that the previously mentioned nodule was still present at 5:00.    A blue nasal suction bulb was placed in the vagina and the attention was returned to the abdomen.  Using a combination of the thunder beat and sharp dissection the nodule was freed this appeared to be a solid lesion possibly a fibroid was removed intact and placed in the vagina to be collected later.  Hemostasis was assured.    The vaginal cuff was closed with figure-of-eight sutures of 0 Vicryl using extracorporeal knot tying technique.  Once the vagina was closed, a butterfly needle was used to inject the suspensory ligaments of the uterus with 0.5% Marcaine with epinephrine for postoperative pain control.  Hemostasis was assured at this point time.  0.25% Marcaine was instilled intra-abdominally pneumoperitoneum was reduced instruments removed incisions were closed by Shad Edge  attention was turned to the vagina.  The blue nasal suction bulb was removed from the vagina and the vaginal cuff is well supported and hemostatic.  Shaffer catheter was removed from the bladder and cystoscopy was performed.  There was brisk flow of urine bilaterally confirming patency of the ureters.  At this point the procedure was complete.  All instruments removed.  Sponge needle counts were correct.  Instrument counts were correct.  Patient was awakened taken the PACU in good condition.    Yash Welsh MD

## 2019-12-09 NOTE — ANESTHESIA CARE TRANSFER NOTE
Patient: Jennifer Valentin    Procedure(s):  HYSTERECTOMY, TOTAL, LAPAROSCOPIC, WITH SALPINGECTOMY & CYSTOSCOPY & EXCISION OF VAGINAL NODULE  Cystoscopy    Diagnosis: Menorrhagia with regular cycle [N92.0]  Dysmenorrhea [N94.6]  Intramural leiomyoma of uterus [D25.1]  Diagnosis Additional Information: No value filed.    Anesthesia Type:   General, ETT     Note:  Airway :Nasal Cannula  Patient transferred to:PACU  Handoff Report: Identifed the Patient, Identified the Reponsible Provider, Reviewed the pertinent medical history, Discussed the surgical course, Reviewed Intra-OP anesthesia mangement and issues during anesthesia, Set expectations for post-procedure period and Allowed opportunity for questions and acknowledgement of understanding      Vitals: (Last set prior to Anesthesia Care Transfer)    CRNA VITALS  12/9/2019 0921 - 12/9/2019 0951      12/9/2019             Pulse:  112    SpO2:  100 %    Resp Rate (observed):  11                Electronically Signed By: NEO Kan CRNA  December 9, 2019  9:51 AM

## 2019-12-09 NOTE — DISCHARGE INSTRUCTIONS
Same Day Surgery Discharge Instructions  Special Precautions After Surgery - Adult    1. It is not unusual to feel lightheaded or faint, up to 24 hours after surgery or while taking pain medication.  If you have these symptoms; sit for a few minutes before standing and have someone assist you when getting up.  2. You should rest and relax for the next 24 hours and must have someone stay with you for at least 24 hours after your discharge.  3. DO NOT DRIVE any vehicle or operate mechanical equipment for 24 hours following the end of your surgery.  DO NOT DRIVE while taking narcotic pain medications that have been prescribed by your physician.  If you had a limb operated on, you must be able to use it fully to drive.  4. DO NOT drink alcoholic beverages for 24 hours following surgery or while taking prescription pain medication.  5. Drink clear liquids (apple juice, ginger ale, broth, 7-Up, etc.).  Progress to your regular diet as you feel able.  6. Any questions call your physician and do not make important decisions for 24 hours.    ACTIVITY  Resume activity as tolerated. No lifting more than 15 pounds or strenuous activity for     INCISIONAL CARE  ? May shower starting tomorrow  ? Be alert for signs of infection: fever, foul smelling or pus like drainage  Pelvic rest for 2 weeks : no tampons douching or intercourseDischarge instructions for Patient with Scopolamine Transdermal Patch    1.  You may leave the patch on behind your ear for three days-But NO LONGER.  May have withdrawal symptoms (nausea, vomiting, headache,dizziness) if used longer.  2.  When you remove the patch, you must wash and dry your hands thoroughly and before touching your eyes, as pupils may dilate.  3.  Discard patch (away from children and pets).  4.  May develop some urinary hesitancy or urine retention.  5.  Patch should be removed by:___________________  Discharge instructions for Patient with Scopolamine Transdermal  Patch    1.  You may leave the patch on behind your ear for three days-But NO LONGER.  May have withdrawal symptoms (nausea, vomiting, headache,dizziness) if used longer.  2.  When you remove the patch, you must wash and dry your hands thoroughly and before touching your eyes, as pupils may dilate.  3.  Discard patch (away from children and pets).  4.  May develop some urinary hesitancy or urine retention.  5.  Patch should be removed by:_12/12/19 at 10:00am  Nausea and Vomiting  What are nausea and vomiting?   Nausea is the queasy feeling you usually have before you vomit. Vomiting is the forceful emptying (throwing up) of the stomach's contents through the mouth.   What causes nausea and vomiting?   Nausea and vomiting are symptoms that may occur with many conditions, such as:   Anesthesia medications   side effect of narcotic medicines  exposure to unpleasant odors or sights   stress and anxiety     How is it treated?   At first you should rest your stomach for a few hours by eating nothing solid and sipping only clear liquids. A little later you can eat soft bland foods that are easy to digest.   It is important to drink small amounts (1 to 4 ounces) often so that you do not become dehydrated. Gradually drink larger amounts of the clear fluids. If you vomit, wait an hour, then start over with a smaller amount of fluid.   Eat slowly and avoid foods that are acidic, spicy, fatty, or fibrous (such as meats, coarse grains, and raw vegetables). Also avoid extremely hot or cold food. In addition, avoid dairy products if you have diarrhea. You may start eating your normal diet again in 3 days or so, when all signs of illness have passed.   Rest as much as possible. Sit or lie down with your head propped up. Do not lie flat for at least 2 hours after eating. Nausea and vomiting usually last only a short period of time. If you have cramping or pain in your belly you can try putting a heating pad set at low or a covered  hot water bottle on your belly. Never set a heating pad on high because you could get burned.   If you have been vomiting for more than a day or have had diarrhea for over 3 days, you may need to have an exam by your provider, including a check for dehydration. If you are very dehydrated, you may need to be given fluids intravenously (IV). In children and older adults dehydration can quickly become life threatening.   When should I call my healthcare provider?   Talk with your provider if you are unable to keep fluids down for more than 12 hours or if you have any of the following symptoms with nausea and vomiting:   severe headache or neck ache, or stiff neck   severe abdominal pain   diarrhea and vomiting that last more than 24 hours   blood in the vomited material that may look red, brown, or black, or like coffee grounds   bloody diarrhea   very forceful vomiting   signs of dehydration such as dry mouth, excessive thirst, little or no urination, severe weakness, dizziness, or lightheadedness.   If you have nausea and pain in the jaw, arm, shoulder, chest, or back; sweating; shortness of breath; or lightheadedness; call 1 for emergency care.   __________________  ?      Call for an appointment to return to the clinic    call to make a post op appointment for 6 weeks with dr. reeves  _________________________________________________________________________________________________________________________________  IMPORTANT NUMBERS:    [facility] Main Number:  [phone], [phone]  Pharmacy:  [phone]  Same Day Surgery:  [phone], Monday - Friday until 8:30 p.m.  Urgent Care:  [phone]  Emergency Room:  [phone]      [facility]:  [phone]                                                                             [facility]:  [phone] option 1  [facility]:  [phone]     [facility]:  [phone]   [facility]:  [phone]    Home Medical Equipment: [phone]  [facility]:  [phone]

## 2019-12-09 NOTE — ANESTHESIA POSTPROCEDURE EVALUATION
Patient: Jennifer Valentin    Procedure(s):  HYSTERECTOMY, TOTAL, LAPAROSCOPIC, WITH SALPINGECTOMY & CYSTOSCOPY & EXCISION OF VAGINAL NODULE  Cystoscopy    Diagnosis:Menorrhagia with regular cycle [N92.0]  Dysmenorrhea [N94.6]  Intramural leiomyoma of uterus [D25.1]  Diagnosis Additional Information: No value filed.    Anesthesia Type:  General, ETT    Note:  Anesthesia Post Evaluation    Patient location during evaluation: Bedside  Patient participation: Able to fully participate in evaluation  Level of consciousness: awake and alert  Pain management: adequate  multimodal analgesia used between 6 hours prior to anesthesia start to PACU dischargeAirway patency: patent  Cardiovascular status: acceptable  Respiratory status: acceptable  two or more mitigation strategies used for obstructive sleep apneaHydration status: acceptable  PONV: none     Anesthetic complications: None          Last vitals:  Vitals:    12/09/19 1130 12/09/19 1145 12/09/19 1200   BP: 126/46 134/82 112/66   Pulse: 97 94 92   Resp: 16     Temp:      SpO2: 96% 95% 95%         Electronically Signed By: NEO Kan CRNA  December 9, 2019  1:18 PM

## 2019-12-11 LAB — COPATH REPORT: NORMAL

## 2019-12-13 ENCOUNTER — HOSPITAL ENCOUNTER (EMERGENCY)
Facility: CLINIC | Age: 39
Discharge: HOME | End: 2019-12-13
Attending: NURSE PRACTITIONER | Admitting: NURSE PRACTITIONER
Payer: COMMERCIAL

## 2019-12-13 ENCOUNTER — APPOINTMENT (OUTPATIENT)
Dept: GENERAL RADIOLOGY | Facility: CLINIC | Age: 39
End: 2019-12-13
Attending: NURSE PRACTITIONER
Payer: COMMERCIAL

## 2019-12-13 VITALS
DIASTOLIC BLOOD PRESSURE: 99 MMHG | TEMPERATURE: 98.4 F | SYSTOLIC BLOOD PRESSURE: 141 MMHG | HEART RATE: 107 BPM | RESPIRATION RATE: 16 BRPM | OXYGEN SATURATION: 98 %

## 2019-12-13 DIAGNOSIS — R30.0 DYSURIA: ICD-10-CM

## 2019-12-13 LAB
ALBUMIN SERPL-MCNC: 3.2 G/DL (ref 3.4–5)
ALBUMIN UR-MCNC: NEGATIVE MG/DL
ALP SERPL-CCNC: 133 U/L (ref 40–150)
ALT SERPL W P-5'-P-CCNC: 44 U/L (ref 0–50)
ANION GAP SERPL CALCULATED.3IONS-SCNC: 7 MMOL/L (ref 3–14)
APPEARANCE UR: CLEAR
AST SERPL W P-5'-P-CCNC: 16 U/L (ref 0–45)
BASOPHILS # BLD AUTO: 0 10E9/L (ref 0–0.2)
BASOPHILS NFR BLD AUTO: 0.4 %
BILIRUB SERPL-MCNC: 1.3 MG/DL (ref 0.2–1.3)
BILIRUB UR QL STRIP: NEGATIVE
BUN SERPL-MCNC: 9 MG/DL (ref 7–30)
CALCIUM SERPL-MCNC: 9.2 MG/DL (ref 8.5–10.1)
CHLORIDE SERPL-SCNC: 107 MMOL/L (ref 94–109)
CO2 SERPL-SCNC: 25 MMOL/L (ref 20–32)
COLOR UR AUTO: YELLOW
CREAT SERPL-MCNC: 0.69 MG/DL (ref 0.52–1.04)
DIFFERENTIAL METHOD BLD: NORMAL
EOSINOPHIL # BLD AUTO: 0.2 10E9/L (ref 0–0.7)
EOSINOPHIL NFR BLD AUTO: 2.4 %
ERYTHROCYTE [DISTWIDTH] IN BLOOD BY AUTOMATED COUNT: 12.1 % (ref 10–15)
GFR SERPL CREATININE-BSD FRML MDRD: >90 ML/MIN/{1.73_M2}
GLUCOSE SERPL-MCNC: 100 MG/DL (ref 70–99)
GLUCOSE UR STRIP-MCNC: NEGATIVE MG/DL
HCT VFR BLD AUTO: 45.4 % (ref 35–47)
HGB BLD-MCNC: 14.7 G/DL (ref 11.7–15.7)
HGB UR QL STRIP: NEGATIVE
IMM GRANULOCYTES # BLD: 0 10E9/L (ref 0–0.4)
IMM GRANULOCYTES NFR BLD: 0.2 %
KETONES UR STRIP-MCNC: NEGATIVE MG/DL
LEUKOCYTE ESTERASE UR QL STRIP: NEGATIVE
LYMPHOCYTES # BLD AUTO: 1.5 10E9/L (ref 0.8–5.3)
LYMPHOCYTES NFR BLD AUTO: 14.8 %
MCH RBC QN AUTO: 29.2 PG (ref 26.5–33)
MCHC RBC AUTO-ENTMCNC: 32.4 G/DL (ref 31.5–36.5)
MCV RBC AUTO: 90 FL (ref 78–100)
MONOCYTES # BLD AUTO: 0.5 10E9/L (ref 0–1.3)
MONOCYTES NFR BLD AUTO: 5 %
NEUTROPHILS # BLD AUTO: 7.8 10E9/L (ref 1.6–8.3)
NEUTROPHILS NFR BLD AUTO: 77.2 %
NITRATE UR QL: NEGATIVE
NRBC # BLD AUTO: 0 10*3/UL
NRBC BLD AUTO-RTO: 0 /100
PH UR STRIP: 6 PH (ref 5–7)
PLATELET # BLD AUTO: 283 10E9/L (ref 150–450)
POTASSIUM SERPL-SCNC: 4.3 MMOL/L (ref 3.4–5.3)
PROT SERPL-MCNC: 7.9 G/DL (ref 6.8–8.8)
RBC # BLD AUTO: 5.03 10E12/L (ref 3.8–5.2)
SODIUM SERPL-SCNC: 139 MMOL/L (ref 133–144)
SOURCE: NORMAL
SP GR UR STRIP: 1.01 (ref 1–1.03)
UROBILINOGEN UR STRIP-MCNC: 0 MG/DL (ref 0–2)
WBC # BLD AUTO: 10.1 10E9/L (ref 4–11)

## 2019-12-13 PROCEDURE — 81003 URINALYSIS AUTO W/O SCOPE: CPT | Performed by: NURSE PRACTITIONER

## 2019-12-13 PROCEDURE — 99285 EMERGENCY DEPT VISIT HI MDM: CPT | Mod: 25 | Performed by: NURSE PRACTITIONER

## 2019-12-13 PROCEDURE — 51798 US URINE CAPACITY MEASURE: CPT | Performed by: NURSE PRACTITIONER

## 2019-12-13 PROCEDURE — 99285 EMERGENCY DEPT VISIT HI MDM: CPT | Mod: Z6 | Performed by: NURSE PRACTITIONER

## 2019-12-13 PROCEDURE — 80053 COMPREHEN METABOLIC PANEL: CPT | Performed by: NURSE PRACTITIONER

## 2019-12-13 PROCEDURE — 87086 URINE CULTURE/COLONY COUNT: CPT | Performed by: NURSE PRACTITIONER

## 2019-12-13 PROCEDURE — 85025 COMPLETE CBC W/AUTO DIFF WBC: CPT | Performed by: NURSE PRACTITIONER

## 2019-12-13 PROCEDURE — 74019 RADEX ABDOMEN 2 VIEWS: CPT

## 2019-12-13 RX ORDER — OXYBUTYNIN CHLORIDE 10 MG/1
10 TABLET, EXTENDED RELEASE ORAL DAILY
Qty: 14 TABLET | Refills: 0 | Status: SHIPPED | OUTPATIENT
Start: 2019-12-13 | End: 2019-12-30

## 2019-12-13 RX ORDER — NORETHINDRONE AND ETHINYL ESTRADIOL 1 MG-35MCG
1 KIT ORAL DAILY
Refills: 0 | COMMUNITY
Start: 2019-10-09 | End: 2019-12-13

## 2019-12-13 NOTE — ED AVS SNAPSHOT
[facility] Emergency Department  [address] [address]  Phone:  [phone]  Fax:  [phone]                                    Jennifer Valentin   MRN: [id number]    Department:  [facility] Emergency Department   Date of Visit:  12/13/2019           After Visit Summary Signature Page    I have received my discharge instructions, and my questions have been answered. I have discussed any challenges I see with this plan with the nurse or doctor.    ..........................................................................................................................................  Patient/Patient Representative Signature      ..........................................................................................................................................  Patient Representative Print Name and Relationship to Patient    ..................................................               ................................................  Date                                   Time    ..........................................................................................................................................  Reviewed by Signature/Title    ...................................................              ..............................................  Date                                               Time          22EPIC Rev 08/18

## 2019-12-13 NOTE — DISCHARGE INSTRUCTIONS
Start the Ditropan today and take 1 tablet daily.  Follow-up on Monday Tuesday or Wednesday with Dr. Rojas or 1 of his partners.  Return to the emergency room if you should develop fevers of 101.5 or greater, nausea that is persistent, vomiting that is persistent, worsening abdominal pain.

## 2019-12-13 NOTE — ED PROVIDER NOTES
History     Chief Complaint   Patient presents with     Abdominal Pain     ?uti, pt had a hysterectomy last monday     HPI  Jennifer Valentin is a 39 year old female with recent laparoscopic hysterectomy 5 days ago who presents to the emergency department with urinary urgency, dysuria, and hesitancy with associated right lower quadrant pain.  Patient rates this pain a 7 out of 10.  Patient states onset of symptoms was Tuesday and was worse yesterday (Thursday) and slightly improved today.  Patient states she has been drinking plenty of fluids and reports daily normal bowel movement since surgery.  Patient states she has not been taking narcotics and just utilizing ibuprofen once or twice a day.  Patient denies blood in the urine or blood in the stool.    Patient denies fever, aches, chills, sweats, ear pain, eye pain, throat pain, cough, wheezing, shortness of breath, abdominal pain, nausea, vomiting, diarrhea, dysuria, speech difficulty, mental confusion, thoughts of harming self.  Patient reports feeling well otherwise    Allergies:  Allergies   Allergen Reactions     Nka [No Known Allergies]        Problem List:    Patient Active Problem List    Diagnosis Date Noted     Menorrhagia with regular cycle 10/31/2019     Priority: Medium     Added automatically from request for surgery [id number]       Dysmenorrhea 10/31/2019     Priority: Medium     Added automatically from request for surgery [id number]       Intramural leiomyoma of uterus 10/31/2019     Priority: Medium     Added automatically from request for surgery [id number]       Class 3 severe obesity due to excess calories without serious comorbidity with body mass index (BMI) of 45.0 to 49.9 in adult (H) 01/31/2019     Priority: Medium     Elevated LFTs 02/05/2018     Priority: Medium     Subclinical hypothyroidism 11/30/2015     Priority: Medium     Family history of thyroid disease 11/17/2015     Priority: Medium     Contraception -- current partner vasectomy  2014     Priority: Medium     CARDIOVASCULAR SCREENING; LDL GOAL LESS THAN 160 10/31/2010     Priority: Medium     Heavy period 2009     Priority: Medium     2013 tolerable.           Past Medical History:    Past Medical History:   Diagnosis Date     BACKACHE NOS 10/10/2006     Menarche age 11     Obese        Past Surgical History:    Past Surgical History:   Procedure Laterality Date     C  DELIVERY ONLY       COLONOSCOPY  2014    Procedure: COLONOSCOPY;  Surgeon: Lyndon Stark MD;  Location: [facility]     CYSTOSCOPY N/A 2019    Procedure: Cystoscopy;  Surgeon: Yash Welsh MD;  Location: [facility]     LAPAROSCOPIC CHOLECYSTECTOMY WITH CHOLANGIOGRAMS  2014    Procedure: LAPAROSCOPIC CHOLECYSTECTOMY WITH CHOLANGIOGRAMS;  Surgeon: Julius Butt MD;  Location: [facility]     LAPAROSCOPIC HYSTERECTOMY TOTAL, BILATERAL SALPINGO-OOPHORECTOMY, COMBINED Bilateral 2019    Procedure: HYSTERECTOMY, TOTAL, LAPAROSCOPIC, WITH SALPINGECTOMY & CYSTOSCOPY & EXCISION OF VAGINAL NODULE;  Surgeon: Yash Welsh MD;  Location: [facility]     WRIST SURGERY Right        Family History:    Family History   Problem Relation Age of Onset     Thyroid Disease Mother 30        hypothyroidism.      Anxiety Disorder Mother      Musculoskeletal Disorder Father          in a motorcycle accident , age 20's or early 30's     Genitourinary Problems Son         kidney reflux     C.A.D. No family hx of      Diabetes No family hx of      Hypertension No family hx of      Breast Cancer No family hx of      Cancer - colorectal No family hx of      Prostate Cancer No family hx of      Colon Cancer No family hx of      Coronary Artery Disease No family hx of        Social History:  Marital Status:   [2]  Social History     Tobacco Use     Smoking status: Never Smoker     Smokeless tobacco: Never Used   Substance Use Topics     Alcohol use: No     Drug use: No        Medications:     hydrOXYzine (VISTARIL) 25 MG capsule  ibuprofen (ADVIL/MOTRIN) 200 MG tablet  levothyroxine (SYNTHROID/LEVOTHROID) 25 MCG tablet  oxybutynin ER (DITROPAN-XL) 10 MG 24 hr tablet  oxyCODONE (ROXICODONE) 5 MG tablet      Review of Systems  As mentioned above in the history present illness. All other systems were reviewed and are negative.    Physical Exam   BP: (!) 145/80  Pulse: 107  Heart Rate: 90  Temp: 98.4  F (36.9  C)  Resp: 16  SpO2: 97 %      Physical Exam  Vitals signs and nursing note reviewed.   Constitutional:       General: She is not in acute distress.     Appearance: She is well-developed. She is not ill-appearing, toxic-appearing or diaphoretic.   HENT:      Head: Normocephalic and atraumatic.      Right Ear: Hearing, tympanic membrane, ear canal and external ear normal.      Left Ear: Hearing, tympanic membrane, ear canal and external ear normal.      Nose: Nose normal.      Mouth/Throat:      Pharynx: Uvula midline.      Tonsils: No tonsillar exudate.   Eyes:      General: No scleral icterus.        Right eye: No discharge.         Left eye: No discharge.      Conjunctiva/sclera: Conjunctivae normal.   Neck:      Musculoskeletal: Normal range of motion and neck supple.   Cardiovascular:      Rate and Rhythm: Normal rate and regular rhythm.      Heart sounds: Normal heart sounds. No murmur. No friction rub.   Pulmonary:      Effort: Pulmonary effort is normal. No respiratory distress.      Breath sounds: Normal breath sounds. No stridor. No wheezing or rales.   Chest:      Chest wall: No tenderness.   Abdominal:      General: Bowel sounds are normal. There is no distension.      Palpations: Abdomen is soft. There is no mass.      Tenderness: There is abdominal tenderness in the right lower quadrant and suprapubic area. There is no right CVA tenderness, left CVA tenderness, guarding or rebound.      Hernia: No hernia is present.   Skin:     General: Skin is warm and dry.      Capillary Refill:  Capillary refill takes less than 2 seconds.      Coloration: Skin is not pale.      Findings: No erythema or rash.   Neurological:      Mental Status: She is alert and oriented to person, place, and time.      Deep Tendon Reflexes: Reflexes normal.   Psychiatric:         Mood and Affect: Mood normal.         ED Course     ED Course as of Dec 13 1855   Fri Dec 13, 2019   1130 Reassessed patient.  Reviewed labs with patient.  Discussed urine as being normal but culture to be placed and ordered and in process.  Reviewed CBC with platelets as normal with a normal hemoglobin noted and no leukocytosis.  Reviewed comprehensive metabolic panel as glucose being 100 and indicating prediabetes if this was fasting and patient did indicate that this was fasting.  Discussed normal renal and kidney function.  Discussed possibility of an upright x-ray and also advised patient that I would be contacting OB/GYN to discuss case.  Advised that this all is looking very reassuring and no concerning signs of infection or bowel obstruction or abscess post surgical.  Patient verbalized understanding denied any questions.      1136 Phone call with Dr. Aguero OB/GYN and reviewed case with her.  She recommended a anticholinergic such as Ditropan to be utilized over the weekend with follow-up on Monday or Tuesday with Dr. Rojas.  Will proceed with this plan as long as abdominal x-ray is unremarkable.  She advised that there will be some free air in the abdomen at this point in time due to surgery.        Procedures    Results for orders placed or performed during the hospital encounter of 12/13/19 (from the past 24 hour(s))   UA reflex to Microscopic and Culture   Result Value Ref Range    Color Urine Yellow     Appearance Urine Clear     Glucose Urine Negative NEG^Negative mg/dL    Bilirubin Urine Negative NEG^Negative    Ketones Urine Negative NEG^Negative mg/dL    Specific Gravity Urine 1.012 1.003 - 1.035    Blood Urine Negative  NEG^Negative    pH Urine 6.0 5.0 - 7.0 pH    Protein Albumin Urine Negative NEG^Negative mg/dL    Urobilinogen mg/dL 0.0 0.0 - 2.0 mg/dL    Nitrite Urine Negative NEG^Negative    Leukocyte Esterase Urine Negative NEG^Negative    Source Midstream Urine    Urine Culture   Result Value Ref Range    Specimen Description Midstream Urine     Special Requests Specimen received in preservative     Culture Micro PENDING    CBC with platelets differential   Result Value Ref Range    WBC 10.1 4.0 - 11.0 10e9/L    RBC Count 5.03 3.8 - 5.2 10e12/L    Hemoglobin 14.7 11.7 - 15.7 g/dL    Hematocrit 45.4 35.0 - 47.0 %    MCV 90 78 - 100 fl    MCH 29.2 26.5 - 33.0 pg    MCHC 32.4 31.5 - 36.5 g/dL    RDW 12.1 10.0 - 15.0 %    Platelet Count 283 150 - 450 10e9/L    Diff Method Automated Method     % Neutrophils 77.2 %    % Lymphocytes 14.8 %    % Monocytes 5.0 %    % Eosinophils 2.4 %    % Basophils 0.4 %    % Immature Granulocytes 0.2 %    Nucleated RBCs 0 0 /100    Absolute Neutrophil 7.8 1.6 - 8.3 10e9/L    Absolute Lymphocytes 1.5 0.8 - 5.3 10e9/L    Absolute Monocytes 0.5 0.0 - 1.3 10e9/L    Absolute Eosinophils 0.2 0.0 - 0.7 10e9/L    Absolute Basophils 0.0 0.0 - 0.2 10e9/L    Abs Immature Granulocytes 0.0 0 - 0.4 10e9/L    Absolute Nucleated RBC 0.0    Comprehensive metabolic panel   Result Value Ref Range    Sodium 139 133 - 144 mmol/L    Potassium 4.3 3.4 - 5.3 mmol/L    Chloride 107 94 - 109 mmol/L    Carbon Dioxide 25 20 - 32 mmol/L    Anion Gap 7 3 - 14 mmol/L    Glucose 100 (H) 70 - 99 mg/dL    Urea Nitrogen 9 7 - 30 mg/dL    Creatinine 0.69 0.52 - 1.04 mg/dL    GFR Estimate >90 >60 mL/min/[1.73_m2]    GFR Estimate If Black >90 >60 mL/min/[1.73_m2]    Calcium 9.2 8.5 - 10.1 mg/dL    Bilirubin Total 1.3 0.2 - 1.3 mg/dL    Albumin 3.2 (L) 3.4 - 5.0 g/dL    Protein Total 7.9 6.8 - 8.8 g/dL    Alkaline Phosphatase 133 40 - 150 U/L    ALT 44 0 - 50 U/L    AST 16 0 - 45 U/L   Abdomen, flat/upright (2 views)    Narrative     ABDOMEN TWO VIEWS 12/13/2019 11:55 AM     HISTORY: Right lower quadrant pain, laparoscopic hysterectomy this  past Monday. Onset of dysuria yesterday.    COMPARISON: 7/31/2014.       Impression    IMPRESSION: Nonobstructive bowel gas pattern without dilated  air-filled loops of bowel. No free air under the diaphragm on the  upright view. Surgical clips in the right upper quadrant presumably  from cholecystectomy. Visualized lung bases are clear. The bones are  unremarkable.     BENIGNO CLARK MD       Medications - No data to display    Assessments & Plan (with Medical Decision Making)  Jennifer Valentin is a 39 year old female with recent laparoscopic hysterectomy 5 days ago who presents to the emergency department with urinary urgency, dysuria, and hesitancy with associated right lower quadrant pain.  Patient rates this pain a 7 out of 10.  Patient states onset of symptoms was Tuesday and was worse yesterday (Thursday) and slightly improved today.  Patient states she has been drinking plenty of fluids and reports daily normal bowel movement since surgery.  Patient states she has not been taking narcotics and just utilizing ibuprofen once or twice a day.  Patient denies blood in the urine or blood in the stool.  On exam patient is nontoxic in appearance and bowel sounds are normal and no signs of acute abdomen there is mild tenderness of right lower quadrant noted and suprapubic tenderness noted.  CBC is unremarkable no signs of acute infection or acute blood loss and comprehensive metabolic panel is unremarkable.  Flat and upright x-ray reveals no free air under the diaphragm and no signs of perforation or concern for obstruction.  Consulted with OB/GYN who recommends an anticholinergic for overactive bladder type symptoms.  Ditropan prescribed.  Urinalysis also obtained and is unremarkable no signs of acute infection.  Follow-up recommended for early next week.  Discussed worrisome signs to return.  Patient  verbalized understanding was discharged in stable condition.     I have reviewed the nursing notes.    I have reviewed the findings, diagnosis, plan and need for follow up with the patient.    Discharge Medication List as of 12/13/2019 12:14 PM      START taking these medications    Details   oxybutynin ER (DITROPAN-XL) 10 MG 24 hr tablet Take 1 tablet (10 mg) by mouth daily, Disp-14 tablet, R-0, E-Prescribe             Final diagnoses:   Dysuria       12/13/2019   [facility] EMERGENCY DEPARTMENT     Goldie Pierce APRN CNP  12/13/19 1524

## 2019-12-13 NOTE — ED NOTES
Pt reports she had a hysterectomy on Monday, reports having pain in lower abdomen/pelvic area with urination. Pt denies painful urination but is not urinating normally, reports pressure in bladder area with urinating and not urinating as much as usual. Pt denies fevers, chills, nausea and vomiting at this time

## 2019-12-14 LAB
BACTERIA SPEC CULT: NORMAL
Lab: NORMAL
SPECIMEN SOURCE: NORMAL

## 2019-12-17 ENCOUNTER — OFFICE VISIT (OUTPATIENT)
Dept: OBGYN | Facility: CLINIC | Age: 39
End: 2019-12-17
Payer: COMMERCIAL

## 2019-12-17 VITALS
BODY MASS INDEX: 44.39 KG/M2 | TEMPERATURE: 98.1 F | RESPIRATION RATE: 18 BRPM | WEIGHT: 260 LBS | HEART RATE: 70 BPM | DIASTOLIC BLOOD PRESSURE: 80 MMHG | SYSTOLIC BLOOD PRESSURE: 100 MMHG | HEIGHT: 64 IN

## 2019-12-17 DIAGNOSIS — N76.0 VAGINAL CUFF CELLULITIS: Primary | ICD-10-CM

## 2019-12-17 DIAGNOSIS — N92.0 MENORRHAGIA WITH REGULAR CYCLE: ICD-10-CM

## 2019-12-17 DIAGNOSIS — D25.1 INTRAMURAL LEIOMYOMA OF UTERUS: ICD-10-CM

## 2019-12-17 DIAGNOSIS — N94.6 DYSMENORRHEA: ICD-10-CM

## 2019-12-17 PROCEDURE — 99024 POSTOP FOLLOW-UP VISIT: CPT | Performed by: OBSTETRICS & GYNECOLOGY

## 2019-12-17 RX ORDER — FLUCONAZOLE 150 MG/1
150 TABLET ORAL ONCE
Qty: 2 TABLET | Refills: 0 | Status: SHIPPED | OUTPATIENT
Start: 2019-12-17 | End: 2019-12-30

## 2019-12-17 RX ORDER — METRONIDAZOLE 500 MG/1
500 TABLET ORAL 3 TIMES DAILY
Qty: 21 TABLET | Refills: 0 | Status: SHIPPED | OUTPATIENT
Start: 2019-12-17 | End: 2019-12-30

## 2019-12-17 ASSESSMENT — MIFFLIN-ST. JEOR: SCORE: 1839.35

## 2019-12-17 NOTE — PROGRESS NOTES
LYN Valentin is a 39 year old female presents for post operative check. She is  1  week(s) status post Total Laparoscopic hysterectomy.  She reports doing well and denies significant pain or bleeding. Pathological findings significant for uterine fibroids.  Her hysterectomy was last Monday.  She has had no bleeding since that time.  She was doing well until Thursday when she noted some pain with urination.  Pain is close the right lower quadrant.  Between episodes of bladder emptying, she has no pain.  She denies any fevers.  She has had  No lifting.    O.  Last menstrual period 11/20/2019, not currently breastfeeding.    Abd: soft, non-tender, non-distended. Incision clear, dry, and intact without evidence of infection.    A. /P. (N76.0) Vaginal cuff cellulitis  (primary encounter diagnosis)  Comment: post op  Plan: metroNIDAZOLE (FLAGYL) 500 MG tablet            (N94.6) Dysmenorrhea  (N92.0) Menorrhagia with regular cycle  (D25.1) Intramural leiomyoma of uterus  Comment: S/P hysterectomy  Plan: RTC 5 weeks         Follow up prn or when due for next annual exam.    Yash Welsh MD

## 2019-12-23 ENCOUNTER — TRANSFERRED RECORDS (OUTPATIENT)
Dept: HEALTH INFORMATION MANAGEMENT | Facility: CLINIC | Age: 39
End: 2019-12-23

## 2019-12-30 ENCOUNTER — OFFICE VISIT (OUTPATIENT)
Dept: FAMILY MEDICINE | Facility: CLINIC | Age: 39
End: 2019-12-30
Payer: COMMERCIAL

## 2019-12-30 VITALS
SYSTOLIC BLOOD PRESSURE: 132 MMHG | DIASTOLIC BLOOD PRESSURE: 82 MMHG | BODY MASS INDEX: 45.73 KG/M2 | HEART RATE: 68 BPM | RESPIRATION RATE: 12 BRPM | TEMPERATURE: 98.7 F | WEIGHT: 266.4 LBS

## 2019-12-30 DIAGNOSIS — N89.8 VAGINAL IRRITATION: ICD-10-CM

## 2019-12-30 DIAGNOSIS — R30.0 DYSURIA: Primary | ICD-10-CM

## 2019-12-30 LAB
ALBUMIN UR-MCNC: NEGATIVE MG/DL
APPEARANCE UR: CLEAR
BILIRUB UR QL STRIP: NEGATIVE
COLOR UR AUTO: YELLOW
GLUCOSE UR STRIP-MCNC: NEGATIVE MG/DL
HGB UR QL STRIP: NEGATIVE
KETONES UR STRIP-MCNC: NEGATIVE MG/DL
LEUKOCYTE ESTERASE UR QL STRIP: NEGATIVE
NITRATE UR QL: NEGATIVE
PH UR STRIP: 6.5 PH (ref 5–7)
SOURCE: NORMAL
SP GR UR STRIP: 1.01 (ref 1–1.03)
SPECIMEN SOURCE: NORMAL
UROBILINOGEN UR STRIP-ACNC: 0.2 EU/DL (ref 0.2–1)
WET PREP SPEC: NORMAL

## 2019-12-30 PROCEDURE — 81003 URINALYSIS AUTO W/O SCOPE: CPT | Performed by: NURSE PRACTITIONER

## 2019-12-30 PROCEDURE — 87210 SMEAR WET MOUNT SALINE/INK: CPT | Performed by: NURSE PRACTITIONER

## 2019-12-30 PROCEDURE — 99213 OFFICE O/P EST LOW 20 MIN: CPT | Performed by: NURSE PRACTITIONER

## 2019-12-30 ASSESSMENT — PAIN SCALES - GENERAL: PAINLEVEL: NO PAIN (0)

## 2019-12-30 NOTE — NURSING NOTE
"Initial /82 (BP Location: Left arm, Patient Position: Chair, Cuff Size: Adult Large)   Pulse 68   Temp 98.7  F (37.1  C) (Tympanic)   Resp 12   Wt 120.8 kg (266 lb 6.4 oz)   LMP 11/20/2019   BMI 45.73 kg/m   Estimated body mass index is 45.73 kg/m  as calculated from the following:    Height as of 12/17/19: 1.626 m (5' 4\").    Weight as of this encounter: 120.8 kg (266 lb 6.4 oz). .    Viv Chen CMA (AAMA)    "

## 2019-12-30 NOTE — PATIENT INSTRUCTIONS
All of your tests are negative,     Try taking an antihistamine to see if the feeling that you are having is an allergy .     Stay well hydrated - urine should be clear.      Limited sugars.

## 2019-12-30 NOTE — PROGRESS NOTES
Subjective     Jennifer Valentin is a 39 year old female who presents to clinic today for the following health issues:    HPI     URINARY TRACT SYMPTOMS  Onset: December 10    Description:   Painful urination (Dysuria): YES           Frequency: no   Blood in urine (Hematuria): no   Delay in urine (Hesitency): no     Intensity: moderate    Progression of Symptoms:  waxing and waning    Accompanying Signs & Symptoms:  Fever/chills: no   Flank pain no   Nausea and vomiting: no   Any vaginal symptoms: none  Abdominal/Pelvic Pain: yes, when she has to urinate     History:   History of frequent UTI's: no   History of kidney stones: no   Sexually Active: YES, but not since onset of symptoms  Possibility of pregnancy: No    Precipitating factors:   Had hysterectomy on  and symptoms started the following day    Therapies Tried and outcome:  -Had been given flagyl on  by Dr. Welsh but was only able to complete 4 days of the abx before d/cing due to hives.  -Diflucan - took diflucan on  and  did get some temporary relief from symptoms for a couple of days  -Cranberry juice - no significant improvement        Patient Active Problem List   Diagnosis     Heavy period     CARDIOVASCULAR SCREENING; LDL GOAL LESS THAN 160     Contraception -- current partner vasectomy     Family history of thyroid disease     Subclinical hypothyroidism     Elevated LFTs     Class 3 severe obesity due to excess calories without serious comorbidity with body mass index (BMI) of 45.0 to 49.9 in adult (H)     Menorrhagia with regular cycle     Dysmenorrhea     Intramural leiomyoma of uterus     Vaginal cuff cellulitis     Past Surgical History:   Procedure Laterality Date     C  DELIVERY ONLY       COLONOSCOPY  2014    Procedure: COLONOSCOPY;  Surgeon: Lyndon Stark MD;  Location: [facility]     CYSTOSCOPY N/A 2019    Procedure: Cystoscopy;  Surgeon: Yash Welsh MD;  Location: [facility]     LAPAROSCOPIC  CHOLECYSTECTOMY WITH CHOLANGIOGRAMS  2014    Procedure: LAPAROSCOPIC CHOLECYSTECTOMY WITH CHOLANGIOGRAMS;  Surgeon: Julius Butt MD;  Location: [facility]     LAPAROSCOPIC HYSTERECTOMY TOTAL, BILATERAL SALPINGO-OOPHORECTOMY, COMBINED Bilateral 2019    Procedure: HYSTERECTOMY, TOTAL, LAPAROSCOPIC, WITH SALPINGECTOMY & CYSTOSCOPY & EXCISION OF VAGINAL NODULE;  Surgeon: Yash Welsh MD;  Location: [facility]     WRIST SURGERY Right        Social History     Tobacco Use     Smoking status: Never Smoker     Smokeless tobacco: Never Used   Substance Use Topics     Alcohol use: No     Family History   Problem Relation Age of Onset     Thyroid Disease Mother 30        hypothyroidism.      Anxiety Disorder Mother      Depression Mother      Musculoskeletal Disorder Father          in a motorcycle accident , age 20's or early 30's     Genitourinary Problems Son         kidney reflux     Other Cancer Other      Depression Brother      C.A.D. No family hx of      Diabetes No family hx of      Hypertension No family hx of      Breast Cancer No family hx of      Cancer - colorectal No family hx of      Prostate Cancer No family hx of      Colon Cancer No family hx of      Coronary Artery Disease No family hx of          Current Outpatient Medications   Medication Sig Dispense Refill     levothyroxine (SYNTHROID/LEVOTHROID) 25 MCG tablet Take 1 tablet (25 mcg) by mouth daily 90 tablet 3     Allergies   Allergen Reactions     Flagyl [Metronidazole] Hives     Nka [No Known Allergies]      BP Readings from Last 3 Encounters:   19 132/82   19 100/80   19 (!) 141/99    Wt Readings from Last 3 Encounters:   19 120.8 kg (266 lb 6.4 oz)   19 117.9 kg (260 lb)   19 122.5 kg (270 lb)                      Reviewed and updated as needed this visit by Provider  Tobacco  Allergies  Meds  Med Hx  Surg Hx  Fam Hx  Soc Hx        Review of Systems   ROS COMP: CONSTITUTIONAL: NEGATIVE for  fever, chills, change in weight  ENT/MOUTH: NEGATIVE for ear, mouth and throat problems  RESP: NEGATIVE for significant cough or SOB  CV: NEGATIVE for chest pain, palpitations or peripheral edema  : dysuria, frequency it is painful when urinating       Objective    /82 (BP Location: Left arm, Patient Position: Chair, Cuff Size: Adult Large)   Pulse 68   Temp 98.7  F (37.1  C) (Tympanic)   Resp 12   Wt 120.8 kg (266 lb 6.4 oz)   LMP 11/20/2019   BMI 45.73 kg/m    Body mass index is 45.73 kg/m .  Physical Exam   GENERAL: healthy, alert and no distress  RESP: lungs clear to auscultation - no rales, rhonchi or wheezes  CV: regular rate and rhythm, normal S1 S2, no S3 or S4, no murmur, click or rub, no peripheral edema and peripheral pulses strong   female external is normal        Diagnostic Test Results:  Labs reviewed in Epic  Results for orders placed or performed in visit on 12/30/19 (from the past 24 hour(s))   *UA reflex to Microscopic and Culture ([facility] (except [place] and [place])   Result Value Ref Range    Color Urine Yellow     Appearance Urine Clear     Glucose Urine Negative NEG^Negative mg/dL    Bilirubin Urine Negative NEG^Negative    Ketones Urine Negative NEG^Negative mg/dL    Specific Gravity Urine 1.015 1.003 - 1.035    Blood Urine Negative NEG^Negative    pH Urine 6.5 5.0 - 7.0 pH    Protein Albumin Urine Negative NEG^Negative mg/dL    Urobilinogen Urine 0.2 0.2 - 1.0 EU/dL    Nitrite Urine Negative NEG^Negative    Leukocyte Esterase Urine Negative NEG^Negative    Source Midstream Urine    Wet prep   Result Value Ref Range    Specimen Description Vagina     Wet Prep Few  WBC'S seen       Wet Prep No Trichomonas seen     Wet Prep No clue cells seen     Wet Prep No yeast seen            ASSESSMENT/PLAN:      ICD-10-CM    1. Dysuria R30.0 *UA reflex to Microscopic and Culture ([facility] (except [place] and [place])   2. Vaginal irritation  N89.8 Wet prep       Patient Instructions   All of your tests are negative,     Try taking an antihistamine to see if the feeling that you are having is an allergy .     Stay well hydrated - urine should be clear.      Limited sugars.

## 2020-01-14 ENCOUNTER — OFFICE VISIT (OUTPATIENT)
Dept: OBGYN | Facility: CLINIC | Age: 40
End: 2020-01-14
Payer: COMMERCIAL

## 2020-01-14 VITALS
TEMPERATURE: 98.5 F | HEIGHT: 64 IN | HEART RATE: 86 BPM | BODY MASS INDEX: 45 KG/M2 | SYSTOLIC BLOOD PRESSURE: 130 MMHG | RESPIRATION RATE: 20 BRPM | WEIGHT: 263.6 LBS | DIASTOLIC BLOOD PRESSURE: 92 MMHG

## 2020-01-14 DIAGNOSIS — N76.0 VAGINAL CUFF CELLULITIS: ICD-10-CM

## 2020-01-14 DIAGNOSIS — D25.1 INTRAMURAL LEIOMYOMA OF UTERUS: Primary | ICD-10-CM

## 2020-01-14 DIAGNOSIS — N92.0 MENORRHAGIA WITH REGULAR CYCLE: ICD-10-CM

## 2020-01-14 PROBLEM — N94.6 DYSMENORRHEA: Status: RESOLVED | Noted: 2019-10-31 | Resolved: 2020-01-14

## 2020-01-14 PROCEDURE — 99024 POSTOP FOLLOW-UP VISIT: CPT | Performed by: OBSTETRICS & GYNECOLOGY

## 2020-01-14 ASSESSMENT — MIFFLIN-ST. JEOR: SCORE: 1855.68

## 2020-01-14 NOTE — PROGRESS NOTES
"LYN Valentin is a 39 year old female presents for post operative check. She is  5  week(s) status post Laparoscopic Hysterectomy with bilateral salpingectomy .  She reports doing well and denies significant pain or bleeding. Pathological findings significant for uterine fibroids    O.  Blood pressure (!) 130/92, pulse 86, temperature 98.5  F (36.9  C), resp. rate 20, height 1.626 m (5' 4\"), weight 119.6 kg (263 lb 9.6 oz), last menstrual period 11/20/2019, not currently breastfeeding.    Abd: soft, non-tender, non-distended. Incisions clear, dry, and intact without evidence of infection.  Pelvic exam: normal vagina and vulva, pelvic exam limited by obesity, uterus surgically absent, post hysterectomy status, vaginal cuff well healed, non-tender, supple and well supported exam chaperoned by nurse.      A. /P.  (D25.1) Intramural leiomyoma of uterus  (primary encounter diagnosis)  (N92.0) Menorrhagia with regular cycle  (D25.1) Intramural leiomyoma of uterus  (primary encounter diagnosis)  Comment: s/p hysterectomy  Plan: resume normal activities          (N76.0) Vaginal cuff cellulitis  Comment: post op complication   Treated with Flagyl  Discovered Hives after 4 days of treatment  Resolved         Follow up prn or when due for next annual exam.    Yash Welsh MD    "

## 2020-02-24 ENCOUNTER — OFFICE VISIT (OUTPATIENT)
Dept: FAMILY MEDICINE | Facility: CLINIC | Age: 40
End: 2020-02-24
Payer: COMMERCIAL

## 2020-02-24 VITALS
RESPIRATION RATE: 16 BRPM | WEIGHT: 263 LBS | HEART RATE: 92 BPM | SYSTOLIC BLOOD PRESSURE: 110 MMHG | DIASTOLIC BLOOD PRESSURE: 70 MMHG | HEIGHT: 65 IN | BODY MASS INDEX: 43.82 KG/M2 | TEMPERATURE: 99 F

## 2020-02-24 DIAGNOSIS — Z13.220 SCREENING FOR LIPID DISORDERS: ICD-10-CM

## 2020-02-24 DIAGNOSIS — E03.8 SUBCLINICAL HYPOTHYROIDISM: ICD-10-CM

## 2020-02-24 DIAGNOSIS — Z12.31 ENCOUNTER FOR SCREENING MAMMOGRAM FOR BREAST CANCER: ICD-10-CM

## 2020-02-24 DIAGNOSIS — Z13.1 SCREENING FOR DIABETES MELLITUS: ICD-10-CM

## 2020-02-24 DIAGNOSIS — Z00.00 ROUTINE GENERAL MEDICAL EXAMINATION AT A HEALTH CARE FACILITY: Primary | ICD-10-CM

## 2020-02-24 PROCEDURE — 99395 PREV VISIT EST AGE 18-39: CPT | Performed by: NURSE PRACTITIONER

## 2020-02-24 PROCEDURE — 99213 OFFICE O/P EST LOW 20 MIN: CPT | Mod: 25 | Performed by: NURSE PRACTITIONER

## 2020-02-24 ASSESSMENT — ENCOUNTER SYMPTOMS
HEADACHES: 0
NAUSEA: 0
ABDOMINAL PAIN: 0
LIGHT-HEADEDNESS: 0
DYSPHORIC MOOD: 0
SORE THROAT: 0
CONSTIPATION: 0
CHEST TIGHTNESS: 0
COUGH: 0
POLYDIPSIA: 0
PALPITATIONS: 0
DIARRHEA: 0
SHORTNESS OF BREATH: 0
SLEEP DISTURBANCE: 0
DIZZINESS: 0
FATIGUE: 0
WHEEZING: 0
VOMITING: 0
RHINORRHEA: 0
NERVOUS/ANXIOUS: 0
ABDOMINAL DISTENTION: 0
ACTIVITY CHANGE: 0
ARTHRALGIAS: 0
POLYPHAGIA: 0
FREQUENCY: 0
DIFFICULTY URINATING: 0
NUMBNESS: 0

## 2020-02-24 ASSESSMENT — PAIN SCALES - GENERAL: PAINLEVEL: NO PAIN (0)

## 2020-02-24 ASSESSMENT — MIFFLIN-ST. JEOR: SCORE: 1868.84

## 2020-02-24 NOTE — PROGRESS NOTES
SUBJECTIVE:   CC: Jennifer Valentin is an 39 year old woman who presents for preventive health visit.     Chief Complaint   Patient presents with     Physical     pt is fasting       Healthy Habits:    Do you get at least three servings of calcium containing foods daily (dairy, green leafy vegetables, etc.)? yes    Amount of exercise or daily activities, outside of work: 5 day(s) per week    Problems taking medications regularly No    Medication side effects: No    Have you had an eye exam in the past two years? yes    Do you see a dentist twice per year? yes    Do you have sleep apnea, excessive snoring or daytime drowsiness?no      Today's PHQ-2 Score:   PHQ-2 ( 1999 Pfizer) 2/24/2020 6/4/2019   Q1: Little interest or pleasure in doing things 0 0   Q2: Feeling down, depressed or hopeless 0 0   PHQ-2 Score 0 0   Q1: Little interest or pleasure in doing things - -   Q2: Feeling down, depressed or hopeless - -   PHQ-2 Score - -       Abuse: Current or Past(Physical, Sexual or Emotional)- No  Do you feel safe in your environment? Yes        Social History     Tobacco Use     Smoking status: Never Smoker     Smokeless tobacco: Never Used   Substance Use Topics     Alcohol use: No     If you drink alcohol do you typically have >3 drinks per day or >7 drinks per week? No                     Reviewed orders with patient.  Reviewed health maintenance and updated orders accordingly - Yes  Current Outpatient Medications   Medication Sig Dispense Refill     levothyroxine (SYNTHROID/LEVOTHROID) 25 MCG tablet Take 1 tablet (25 mcg) by mouth daily 90 tablet 3     Allergies   Allergen Reactions     Flagyl [Metronidazole] Hives       Mammogram not appropriate for this patient based on age.    Pertinent mammograms are reviewed under the imaging tab.  History of abnormal Pap smear: NO - age 30-65 PAP every 5 years with negative HPV co-testing recommended  PAP / HPV Latest Ref Rng & Units 2/10/2017 9/19/2013 4/1/2010   PAP - NIL  NIL WARNING! - Addended   HPV 16 DNA NEG Negative - -   HPV 18 DNA NEG Negative - -   OTHER HR HPV NEG Negative - -     Reviewed and updated as needed this visit by clinical staff  Tobacco  Allergies  Meds  Med Hx  Surg Hx  Fam Hx  Soc Hx        Reviewed and updated as needed this visit by Provider        Here for physical. No current concerns. Is not fasting today       Needs to have refill of levothyroxine. Has been taking it and tolerating it well, no side effects that is aware of.     Last Pap: 2017 normal, neg HPV. Last Dec had hyst due to heavy bleeding.   Last mammogram: 2017. No family history of breast cancer.   Last BMD: N/A  Last Colonoscopy: 2014-normal. No family history of colon cancer.   Last eye exam: yearly   Last dental exam: Every 6 mo  Last tetanus vaccine: 2011  Last influenza vaccine: Oct 2019  Last shingles vaccine: N/A  Last pneumonia vaccine: N/A  Hep C screen (born [birth year]): N/A  HIV screen: was with pregnancies   AAA screen (age 65-78 with smoking hx): N/A  IVD (HTN, Hyperlipid, Smoking): N/A  Lung CA screening (55-80, 30 pk smoking hx): N/A    ROS:  Review of Systems   Constitutional: Negative for activity change and fatigue.   HENT: Negative for ear pain, rhinorrhea and sore throat.    Respiratory: Negative for cough, chest tightness, shortness of breath and wheezing.    Cardiovascular: Negative for chest pain, palpitations and leg swelling.   Gastrointestinal: Negative for abdominal distention, abdominal pain, constipation, diarrhea, nausea and vomiting.   Endocrine: Negative for cold intolerance, heat intolerance, polydipsia, polyphagia and polyuria.   Genitourinary: Negative for difficulty urinating, enuresis, frequency and urgency.   Musculoskeletal: Negative for arthralgias.   Skin: Negative for rash.   Neurological: Negative for dizziness, light-headedness, numbness and headaches.   Psychiatric/Behavioral: Negative for dysphoric mood and sleep disturbance. The patient is  "not nervous/anxious.           OBJECTIVE:   /70 (BP Location: Left arm, Patient Position: Sitting, Cuff Size: Adult Large)   Pulse 92   Temp 99  F (37.2  C) (Tympanic)   Resp 16   Ht 1.651 m (5' 5\")   Wt 119.3 kg (263 lb)   LMP 11/20/2019   BMI 43.77 kg/m    EXAM:  Physical Exam  Constitutional:       Appearance: Normal appearance. She is well-developed.   HENT:      Head: Normocephalic and atraumatic.      Right Ear: Tympanic membrane and external ear normal. No middle ear effusion.      Left Ear: Tympanic membrane and external ear normal.  No middle ear effusion.      Nose: No mucosal edema.      Mouth/Throat:      Pharynx: Uvula midline.   Eyes:      Pupils: Pupils are equal, round, and reactive to light.   Neck:      Musculoskeletal: Normal range of motion.      Thyroid: No thyromegaly.      Vascular: No carotid bruit.   Cardiovascular:      Rate and Rhythm: Normal rate and regular rhythm.      Pulses:           Femoral pulses are 2+ on the right side and 2+ on the left side.     Heart sounds: Normal heart sounds.   Pulmonary:      Effort: Pulmonary effort is normal.      Breath sounds: Normal breath sounds.   Abdominal:      General: Bowel sounds are normal.      Palpations: Abdomen is soft.      Tenderness: There is no abdominal tenderness.   Musculoskeletal: Normal range of motion.   Skin:     General: Skin is warm and dry.      Findings: No rash.   Neurological:      Mental Status: She is alert.   Psychiatric:         Behavior: Behavior normal.           ASSESSMENT/PLAN:   1. Routine general medical examination at a health care facility  Screening guidelines reviewed.     2. Screening for lipid disorders  Return for fasting labs  - Lipid panel reflex to direct LDL Fasting; Future    3. Screening for diabetes mellitus  Return for fasting labs  - Glucose; Future    4. Encounter for screening mammogram for breast cancer  Order placed, plans to call per self and set up  - MA Screening Digital " "Bilateral; Future    5. Subclinical hypothyroidism  Return for labs  - TSH with free T4 reflex; Future  Will send refills of levothyroxine when labs return.       COUNSELING:   Reviewed preventive health counseling, as reflected in patient instructions       Regular exercise       Healthy diet/nutrition    Estimated body mass index is 43.77 kg/m  as calculated from the following:    Height as of this encounter: 1.651 m (5' 5\").    Weight as of this encounter: 119.3 kg (263 lb).    Weight management plan: Discussed healthy diet and exercise guidelines     reports that she has never smoked. She has never used smokeless tobacco.      Counseling Resources:  ATP IV Guidelines  Pooled Cohorts Equation Calculator  Breast Cancer Risk Calculator  FRAX Risk Assessment  ICSI Preventive Guidelines  Dietary Guidelines for Americans, 2010  USDA's MyPlate  ASA Prophylaxis  Lung CA Screening    [facility]  "

## 2020-03-02 ENCOUNTER — TRANSFERRED RECORDS (OUTPATIENT)
Dept: HEALTH INFORMATION MANAGEMENT | Facility: CLINIC | Age: 40
End: 2020-03-02

## 2020-03-10 DIAGNOSIS — Z13.220 SCREENING FOR LIPID DISORDERS: ICD-10-CM

## 2020-03-10 DIAGNOSIS — Z13.1 SCREENING FOR DIABETES MELLITUS: ICD-10-CM

## 2020-03-10 DIAGNOSIS — E03.8 SUBCLINICAL HYPOTHYROIDISM: ICD-10-CM

## 2020-03-10 LAB
CHOLEST SERPL-MCNC: 192 MG/DL
GLUCOSE SERPL-MCNC: 94 MG/DL (ref 70–99)
HDLC SERPL-MCNC: 43 MG/DL
LDLC SERPL CALC-MCNC: 118 MG/DL
NONHDLC SERPL-MCNC: 149 MG/DL
TRIGL SERPL-MCNC: 156 MG/DL
TSH SERPL DL<=0.005 MIU/L-ACNC: 2.46 MU/L (ref 0.4–4)

## 2020-03-10 PROCEDURE — 80061 LIPID PANEL: CPT | Performed by: NURSE PRACTITIONER

## 2020-03-10 PROCEDURE — 84443 ASSAY THYROID STIM HORMONE: CPT | Performed by: NURSE PRACTITIONER

## 2020-03-10 PROCEDURE — 82947 ASSAY GLUCOSE BLOOD QUANT: CPT | Performed by: NURSE PRACTITIONER

## 2020-03-10 PROCEDURE — 36415 COLL VENOUS BLD VENIPUNCTURE: CPT | Performed by: NURSE PRACTITIONER

## 2020-04-02 DIAGNOSIS — E03.9 ACQUIRED HYPOTHYROIDISM: ICD-10-CM

## 2020-04-02 RX ORDER — LEVOTHYROXINE SODIUM 25 UG/1
25 TABLET ORAL DAILY
Qty: 90 TABLET | Refills: 2 | Status: SHIPPED | OUTPATIENT
Start: 2020-04-02 | End: 2020-12-22

## 2020-04-02 NOTE — TELEPHONE ENCOUNTER
"Requested Prescriptions   Pending Prescriptions Disp Refills     levothyroxine (SYNTHROID/LEVOTHROID) 25 MCG tablet [Pharmacy Med Name: LEVOTHYROXINE 25 MCG TABLET] 90 tablet 3     Sig: TAKE 1 TABLET (25 MCG) BY MOUTH DAILY       Thyroid Protocol Passed - 4/2/2020 12:22 AM        Passed - Patient is 12 years or older        Passed - Recent (12 mo) or future (30 days) visit within the authorizing provider's specialty     Patient has had an office visit with the authorizing provider or a provider within the authorizing providers department within the previous 12 mos or has a future within next 30 days. See \"Patient Info\" tab in inbasket, or \"Choose Columns\" in Meds & Orders section of the refill encounter.              Passed - Medication is active on med list        Passed - Normal TSH on file in past 12 months     Recent Labs   Lab Test 03/10/20  0743   TSH 2.46              Passed - No active pregnancy on record     If patient is pregnant or has had a positive pregnancy test, please check TSH.          Passed - No positive pregnancy test in past 12 months     If patient is pregnant or has had a positive pregnancy test, please check TSH.               "

## 2020-09-08 ENCOUNTER — HOSPITAL ENCOUNTER (OUTPATIENT)
Dept: MAMMOGRAPHY | Facility: CLINIC | Age: 40
Discharge: HOME | End: 2020-09-08
Attending: NURSE PRACTITIONER | Admitting: NURSE PRACTITIONER
Payer: COMMERCIAL

## 2020-09-08 DIAGNOSIS — Z12.31 ENCOUNTER FOR SCREENING MAMMOGRAM FOR BREAST CANCER: ICD-10-CM

## 2020-09-08 PROCEDURE — 77067 SCR MAMMO BI INCL CAD: CPT

## 2020-09-23 ENCOUNTER — OFFICE VISIT (OUTPATIENT)
Dept: FAMILY MEDICINE | Facility: CLINIC | Age: 40
End: 2020-09-23
Payer: COMMERCIAL

## 2020-09-23 VITALS
BODY MASS INDEX: 42.73 KG/M2 | WEIGHT: 256.8 LBS | RESPIRATION RATE: 12 BRPM | SYSTOLIC BLOOD PRESSURE: 112 MMHG | TEMPERATURE: 99 F | DIASTOLIC BLOOD PRESSURE: 70 MMHG | HEART RATE: 60 BPM

## 2020-09-23 DIAGNOSIS — K21.00 GASTROESOPHAGEAL REFLUX DISEASE WITH ESOPHAGITIS: Primary | ICD-10-CM

## 2020-09-23 PROCEDURE — 99213 OFFICE O/P EST LOW 20 MIN: CPT | Performed by: NURSE PRACTITIONER

## 2020-09-23 RX ORDER — FAMOTIDINE 20 MG/1
TABLET, FILM COATED ORAL
Qty: 90 TABLET | Refills: 1 | Status: SHIPPED | OUTPATIENT
Start: 2020-09-23 | End: 2021-01-05

## 2020-09-23 ASSESSMENT — ENCOUNTER SYMPTOMS
EYE DISCHARGE: 0
DIARRHEA: 1
SINUS PRESSURE: 0
NAUSEA: 0
FEVER: 0
HEADACHES: 0
SHORTNESS OF BREATH: 0
EYE ITCHING: 0
RHINORRHEA: 0
ABDOMINAL PAIN: 1
DIZZINESS: 0
FATIGUE: 0
LIGHT-HEADEDNESS: 0
SORE THROAT: 0
COUGH: 0
CHILLS: 0
CONSTIPATION: 0
DIAPHORESIS: 0
WHEEZING: 0

## 2020-09-23 ASSESSMENT — PAIN SCALES - GENERAL: PAINLEVEL: NO PAIN (0)

## 2020-09-23 NOTE — PROGRESS NOTES
Subjective     Jennifer Valentin is a 39 year old female who presents to clinic today for the following health issues:    HPI       GERD/Heartburn  Onset/Duration: 6 weeks  Description: heartburn, diarrhea  Intensity: severe  Progression of Symptoms: Worsened for ahile now improving  Accompanying Signs & Symptoms:  Does it feel like food gets stuck or trouble swallowing: no  Nausea: YES  Vomiting (bloody?): no  Abdominal Pain: YES  Black-Tarry stools: no  Bloody stools: no  History:  Previous similar episodes: YES  Previous ulcers: no  Precipitating factors:   Caffeine use: YES  Alcohol use: no  NSAID/Aspirin use: no  Tobacco use: no  Worse with spicy foods.  Alleviating factors: None  Therapies tried and outcome:             Lifestyle changes: tries to limit coffee on bad days            Medications: antacids      Daily the last 6 weeks has been having pain. This pain is similar to the pain that had when had gallbladder out. Stomach pain, feels it into chest. No coughing or wheezing. Is nauseated. Will hear gurglining Stool are more acidy, they are looser. Has been taking 6 tums per day for 2 weeks. Stools do not look dark, no blood in stool. Does not drink alcohol. Drinks 1 pop and 1 coffee per day. No NSAID use. Only takes tylenol as needed.     Review of Systems   Constitutional: Negative for chills, diaphoresis, fatigue and fever.   HENT: Negative for ear discharge, ear pain, hearing loss, rhinorrhea, sinus pressure and sore throat.    Eyes: Negative for discharge and itching.   Respiratory: Negative for cough, shortness of breath and wheezing.    Gastrointestinal: Positive for abdominal pain and diarrhea. Negative for constipation and nausea.   Skin: Negative for rash.   Neurological: Negative for dizziness, light-headedness and headaches.          Objective    /70 (BP Location: Left arm, Patient Position: Sitting, Cuff Size: Adult Large)   Pulse 60   Temp 99  F (37.2  C) (Tympanic)   Resp 12   Wt  "116.5 kg (256 lb 12.8 oz)   LMP 11/20/2019   BMI 42.73 kg/m    Body mass index is 42.73 kg/m .  Physical Exam  Constitutional:       Appearance: She is well-developed.   Cardiovascular:      Rate and Rhythm: Normal rate and regular rhythm.   Pulmonary:      Effort: Pulmonary effort is normal.      Breath sounds: Normal breath sounds.   Abdominal:      Tenderness: There is abdominal tenderness in the epigastric area. There is no guarding or rebound.   Skin:     General: Skin is warm.   Neurological:      Mental Status: She is alert.              Assessment & Plan     Gastroesophageal reflux disease with esophagitis  Discussed how to diminish symptoms. Patient understands that it may take 1-2 weeks to experience an improvement in symptoms  --Enc to stop smoking  --Stop alcohol or at least drink no more than one drink per day  --Avoid eating large meals, do not eat late at night   --Avoid foods that trigger   --Elevate head of bed 2-3 inches  --Eat frequently  Trying to increase activity to decrease weight.  - famotidine (PEPCID) 20 MG tablet; Take 1 twice per day for 30 days and then may decrease to daily.  Notify if no improvement and may need to change to omeprazole.    BMI:   Estimated body mass index is 42.73 kg/m  as calculated from the following:    Height as of 2/24/20: 1.651 m (5' 5\").    Weight as of this encounter: 116.5 kg (256 lb 12.8 oz).   Weight management plan: Discussed healthy diet and exercise guidelines    No follow-ups on file.    NEO Stone [facility]    "

## 2020-10-07 ENCOUNTER — APPOINTMENT (OUTPATIENT)
Dept: LAB | Facility: CLINIC | Age: 40
End: 2020-10-07
Payer: COMMERCIAL

## 2020-10-07 DIAGNOSIS — R19.7 DIARRHEA, UNSPECIFIED TYPE: Primary | ICD-10-CM

## 2020-10-07 NOTE — PROGRESS NOTES
Please call patient and set up for lab only appointment time  specimen containers to obtain ordered stool samples.  May double book in lab.    Julissa POWERS

## 2020-10-08 DIAGNOSIS — R19.7 DIARRHEA, UNSPECIFIED TYPE: ICD-10-CM

## 2020-10-08 LAB
HEMOCCULT STL QL: NEGATIVE
LACTOFERRIN STL QL IA: NEGATIVE

## 2020-10-08 PROCEDURE — 83630 LACTOFERRIN FECAL (QUAL): CPT | Performed by: NURSE PRACTITIONER

## 2020-10-08 PROCEDURE — 87338 HPYLORI STOOL AG IA: CPT | Performed by: NURSE PRACTITIONER

## 2020-10-08 PROCEDURE — 87506 IADNA-DNA/RNA PROBE TQ 6-11: CPT | Performed by: NURSE PRACTITIONER

## 2020-10-08 PROCEDURE — 82272 OCCULT BLD FECES 1-3 TESTS: CPT

## 2020-10-09 LAB
C COLI+JEJUNI+LARI FUSA STL QL NAA+PROBE: NOT DETECTED
EC STX1 GENE STL QL NAA+PROBE: NOT DETECTED
EC STX2 GENE STL QL NAA+PROBE: NOT DETECTED
ENTERIC PATHOGEN COMMENT: NORMAL
H PYLORI AG STL QL IA: NEGATIVE
NOROV GI+II ORF1-ORF2 JNC STL QL NAA+PR: NOT DETECTED
RVA NSP5 STL QL NAA+PROBE: NOT DETECTED
SALMONELLA SP RPOD STL QL NAA+PROBE: NOT DETECTED
SHIGELLA SP+EIEC IPAH STL QL NAA+PROBE: NOT DETECTED
V CHOL+PARA RFBL+TRKH+TNAA STL QL NAA+PR: NOT DETECTED
Y ENTERO RECN STL QL NAA+PROBE: NOT DETECTED

## 2020-11-10 ENCOUNTER — VIRTUAL VISIT (OUTPATIENT)
Dept: FAMILY MEDICINE | Facility: OTHER | Age: 40
End: 2020-11-10

## 2020-11-10 NOTE — PROGRESS NOTES
"Date: 11/10/2020 12:10:38  Clinician: Maida Patton  Clinician NPI: [id number]  Patient: WALTER CHOUDHURY  Patient : 1980  Patient Address: [address] [place] [address]  Patient Phone: [phone]  Visit Protocol: URI  Patient Summary:  WALTER is a 40 year old ( : 1980 ) female who initiated a [facility] Visit for COVID-19 (Coronavirus) evaluation and screening. When asked the question \"Please sign me up to receive news, health information and promotions. \", WALTER responded \"No\".    When asked when her symptoms started, WALTER reported that she does not have any symptoms.   She denies taking antibiotic medication in the past month and having recent facial or sinus surgery in the past 60 days.    Pertinent COVID-19 (Coronavirus) information  WALTER does not work or volunteer as healthcare worker or a . In the past 14 days, WALTER has not worked or volunteered at a healthcare facility or group living setting.   In the past 14 days, she also has not lived in a congregate living setting.   WALTER has had a close contact with a laboratory-confirmed COVID-19 patient in the last 14 days. She was exposed at her work. Date WALTER was exposed to the laboratory-confirmed COVID-19 patient: 2020   Additional information about contact with COVID-19 (Coronavirus) patient as reported by the patient (free text): Coworker   WALTER is not living in the same household with the COVID-19 positive patient. She was in an enclosed space for greater than 15 minutes with the COVID-19 patient.   During the encounter, both of them were wearing masks.   Since 2019, WALTER has not been tested for COVID-19 and has not had upper respiratory infection or influenza-like illness.   Pertinent medical history  WALTER typically gets a yeast infection when she takes antibiotics. She has used fluconazole (Diflucan) to treat previous yeast infections. 2 doses of " fluconazole (Diflucan) has typically been needed for symptoms to resolve in the past.  WALTER needs a return to work/school note.   Weight: 240 lbs   WALTER does not smoke or use smokeless tobacco.   She denies pregnancy and denies breastfeeding. She does not menstruate.   Weight: 240 lbs    MEDICATIONS: levothyroxine oral, famotidine oral, ALLERGIES: NKDA  Clinician Response:  Dear WALTER,   Based on your exposure to COVID-19 (coronavirus), we would like to test you for this virus.  1. Please call [phone] to schedule your visit. Explain that you were referred by [facility] to have a COVID-19 test. Be ready to share your [facility] visit ID number.  * If you need to schedule in [facility] please call [phone] or for [employer] employees please call [phone].   * If you need to schedule in the [place] area please call [phone]. [employer] employees call [phone].   The following will serve as your written order for this COVID Test, ordered by me, for the indication of suspected COVID [Z20.828]: The test will be ordered in [employer], our electronic health record, after you are scheduled. It will show as ordered and authorized by Sterling eRstrepo MD.  Order: COVID-19 (coronavirus) PCR for ASYMPTOMATIC EXPOSURE testing from [facility].   If you know you have had close contact with someone who tested positive, you should be quarantined for 14 days after this exposure. You should stay in quarantine for the14 days even if the covid test is negative, the optimal time to test after exposure is 5-7 days from the exposure  Quarantine means   What should I do?  For safety, it's very important to follow these rules. Do this for 14 days after the date you were last exposed to the virus..  Stay home and away from others. Don't go to school or anywhere else. Generally quarantine means staying home from work but there are some exceptions to this. Please contact your workplace.   No hugging, kissing or shaking hands.  Don't let  anyone visit.  Cover your mouth and nose with a mask, tissue or washcloth to avoid spreading germs.  Wash your hands and face often. Use soap and water.  What are the symptoms of COVID-19?  The most common symptoms are cough, fever and trouble breathing. Less common symptoms include headache, body aches, fatigue (feeling very tired), chills, sore throat, stuffy or runny nose, diarrhea (loose poop), loss of taste or smell, belly pain, and nausea or vomiting (feeling sick to your stomach or throwing up).  After 14 days, if you have still don't have symptoms, you likely don't have this virus.  If you develop symptoms, follow these guidelines.  If you're normally healthy: Please start another [facility] visit to report your symptoms. Go to [email or web address].  If you have a serious health problem (like cancer, heart failure, an organ transplant or kidney disease): Call your specialty clinic. Let them know that you might have COVID-19.  2. When it's time for your COVID test:  Stay at least 6 feet away from others. (If someone will drive you to your test, stay in the backseat, as far away from the  as you can.)  Cover your mouth and nose with a mask, tissue or washcloth.  Go straight to the testing site. Don't make any stops on the way there or back.  Please note  Caregivers in these groups are at risk for severe illness due to COVID-19:  o People 65 years and older  o People who live in a nursing home or long-term care facility  o People with chronic disease (lung, heart, cancer, diabetes, kidney, liver, immunologic)  o People who have a weakened immune system, including those who:  Are in cancer treatment  Take medicine that weakens the immune system, such as corticosteroids  Had a bone marrow or organ transplant  Have an immune deficiency  Have poorly controlled HIV or AIDS  Are obese (body mass index of 40 or higher)  Smoke regularly  Where can I get more information?   [facility] -- About COVID-19:  [email or web address]  CDC -- What to Do If You're Sick: www.cdc.gov/coronavirus/2019-ncov/about/steps-when-sick.html  CDC -- Ending Home Isolation: www.cdc.gov/coronavirus/2019-ncov/hcp/disposition-in-home-patients.html  CDC -- Caring for Someone: www.cdc.gov/coronavirus/2019-ncov/if-you-are-sick/care-for-someone.html  [facility] -- Interim Guidance for Hospital Discharge to Home: www.[facility].mn./diseases/coronavirus/hcp/hospdischarge.pdf  [facility] clinical trials (COVID-19 research studies): clinicalaffairs.[facility]-clinical-trials  Below are the COVID-19 hotlines at the [place] Department of Health ([facility]). Interpreters are available.  For health questions: Call 077-977-7728 or 1-490.920.4013 (7 a.m. to 7 p.m.)  For questions about schools and childcare: Call 481-603-9433 or 1-205.207.8671 (7 a.m. to 7 p.m.)    Diagnosis: Contact with and (suspected) exposure to other viral communicable diseases  Diagnosis ICD: Z20.828

## 2020-11-14 ENCOUNTER — AMBULATORY - HEALTHEAST (OUTPATIENT)
Dept: FAMILY MEDICINE | Facility: CLINIC | Age: 40
End: 2020-11-14

## 2020-11-14 DIAGNOSIS — Z20.822 SUSPECTED COVID-19 VIRUS INFECTION: ICD-10-CM

## 2020-11-15 ENCOUNTER — AMBULATORY - HEALTHEAST (OUTPATIENT)
Dept: FAMILY MEDICINE | Facility: CLINIC | Age: 40
End: 2020-11-15

## 2020-11-15 DIAGNOSIS — Z20.822 SUSPECTED COVID-19 VIRUS INFECTION: ICD-10-CM

## 2020-11-17 ENCOUNTER — COMMUNICATION - HEALTHEAST (OUTPATIENT)
Dept: SCHEDULING | Facility: CLINIC | Age: 40
End: 2020-11-17

## 2020-11-17 ENCOUNTER — OFFICE VISIT (OUTPATIENT)
Dept: FAMILY MEDICINE | Facility: CLINIC | Age: 40
End: 2020-11-17
Payer: COMMERCIAL

## 2020-11-17 VITALS
SYSTOLIC BLOOD PRESSURE: 110 MMHG | WEIGHT: 249.4 LBS | OXYGEN SATURATION: 98 % | BODY MASS INDEX: 41.5 KG/M2 | TEMPERATURE: 98.6 F | RESPIRATION RATE: 16 BRPM | DIASTOLIC BLOOD PRESSURE: 64 MMHG | HEART RATE: 120 BPM

## 2020-11-17 DIAGNOSIS — M54.42 ACUTE MIDLINE LOW BACK PAIN WITH LEFT-SIDED SCIATICA: Primary | ICD-10-CM

## 2020-11-17 PROCEDURE — 99213 OFFICE O/P EST LOW 20 MIN: CPT | Performed by: NURSE PRACTITIONER

## 2020-11-17 RX ORDER — PREDNISONE 20 MG/1
40 TABLET ORAL DAILY
Qty: 10 TABLET | Refills: 0 | Status: SHIPPED | OUTPATIENT
Start: 2020-11-17 | End: 2020-11-22

## 2020-11-17 RX ORDER — CYCLOBENZAPRINE HCL 10 MG
5-10 TABLET ORAL 3 TIMES DAILY PRN
Qty: 30 TABLET | Refills: 0 | Status: SHIPPED | OUTPATIENT
Start: 2020-11-17 | End: 2021-03-18

## 2020-11-17 NOTE — PROGRESS NOTES
Subjective     Jennifer Valentin is a 40 year old female who presents to clinic today for the following health issues:    HPI         Pain  Onset: yesterday    Description:   Location: back pain-lower back    Progression of Symptoms: worse- sharp, constant. Goes into butt/legs- L>R. No loss of bowel or bladder. No urinary symptoms.  No weakness.  No injury. No fevers.     Accompanying Signs & Symptoms:  Other symptoms: none    Precipitating factors:   Trauma or overuse: no   Therapies Tried and outcome: none      Review of Systems   Constitutional, HEENT, cardiovascular, pulmonary, GI, , musculoskeletal, neuro, skin, endocrine and psych systems are negative, except as otherwise noted.      Objective    /64   Pulse 120   Temp 98.6  F (37  C) (Tympanic)   Resp 16   Wt 113.1 kg (249 lb 6.4 oz)   LMP 11/20/2019   SpO2 98%   BMI 41.50 kg/m    Body mass index is 41.5 kg/m .  Physical Exam   GENERAL: healthy, alert and no distress  RESP: lungs clear to auscultation - no rales, rhonchi or wheezes  CV: regular rate and rhythm, normal S1 S2, no S3 or S4, no murmur, click or rub, no peripheral edema and peripheral pulses strong  MS: no gross musculoskeletal defects noted, no edema no pain with palpation of midline spine, no CVA tenderness.  Low back pain with dependent leg raise >60 degrees L>R  NEURO: Normal strength and tone  PSYCH: mentation appears normal, affect normal/bright    See orders        Assessment & Plan     Jennifer was seen today for pain.    Diagnoses and all orders for this visit:    Acute midline low back pain with left-sided sciatica  -     cyclobenzaprine (FLEXERIL) 10 MG tablet; Take 0.5-1 tablets (5-10 mg) by mouth 3 times daily as needed for muscle spasms  -     predniSONE (DELTASONE) 20 MG tablet; Take 2 tablets (40 mg) by mouth daily for 5 days            See Patient Instructions: follow up if symptoms persist or worsen.     Return in about 4 weeks (around 12/15/2020), or if  symptoms worsen or fail to improve.    ETHAN Thorpe  [facility]

## 2020-11-17 NOTE — PATIENT INSTRUCTIONS
Patient Education     Relieving Back Pain  Back pain is a common problem. You can strain back muscles by lifting too much weight or just by moving the wrong way. Back strain can be uncomfortable, even painful. And it can take weeks or months to improve. To help yourself feel better and prevent future back strains, try these tips.   Important: Don't give aspirin to children or teens without first discussing it with your child's healthcare provider.   Ice    Ice reduces muscle pain and swelling. It helps most during the first 24 to 48 hours after an injury.     Wrap an ice pack or a bag of frozen peas in a thin towel. Never put ice directly on your skin.    Place the ice where your back hurts the most.    Don t ice for more than 20 minutes at a time.    You can use ice several times a day.  Medicines  Over-the-counter pain relievers include acetaminophen and anti-inflammatory medicines, which includes aspirin, naproxen, or ibuprofen. They can help ease discomfort. Some also reduce swelling.     Tell your healthcare provider about any medicines you are already taking.    Take medicines only as directed.  Manipulation and massage  Having manipulation by an osteopathic doctor or chiropractor may be helpful. Getting a massage also may help.    Heat  After the first 48 hours, heat can relax sore muscles and improve blood flow.    Try a warm bath or shower. Or use a heating pad set on low. To prevent a burn, keep a cloth between you and the heating pad.    Don t use a heating pad for more than 15 minutes at a time. Never sleep on a heating pad.  [employer] last reviewed this educational content on 6/1/2018 2000-2020 The [employer], [employer]. [address] [place] [address], [place] [address]. All rights reserved. This information is not intended as a substitute for professional medical care. Always follow your healthcare professional's instructions.           Patient Education     Exercises to Strengthen Your Lower Back  Strong  lower back and abdominal muscles work together to support your spine. The exercises below will help strengthen the lower back. It's important that you start exercising slowly and increase levels gradually.   Always start any exercise program with stretching. If you feel pain while doing any of these exercises, stop and talk to your doctor about a more specific exercise program that better suits your condition.    Low back stretch  The point of stretching is to make you more flexible and increase your range of motion. Stretch only as much as you are able. Stretch slowly. Don't push your stretch to the limit. If at any point you feel pain while stretching, this is your (temporary) limit.     Lie on your back with your knees bent and both feet on the ground.    Slowly raise your left knee to your chest as you flatten your lower back against the floor. Hold for 5 seconds.    Relax and repeat the exercise with your right knee.    Do 10 of these exercises for each leg.    Repeat hugging both knees to your chest at the same time.  Building lower back strength  Start your exercise routine with 10 to 30 minutes a day, 1 to 3 times a day.  Initial exercises  Lying on your back:  1. Ankle pumps. Move your foot up and down, towards your head, and then away. Repeat 10 times with each foot.  2. Heel slides. Slowly bend your knee, drawing the heel of your foot towards you. Then slide your heel/foot from you, straightening your knee. Don't lift your foot off the floor (this is not a leg lift).  3. Abdominal contraction. Bend your knees and put your hands on your stomach. Tighten your stomach muscles. Hold for 5 seconds, then relax. Repeat 10 times.  4. Straight leg raise. Bend one leg at the knee and keep the other leg straight. Tighten your stomach muscles. Slowly lift your straight leg 6 to 12 inches off the floor and hold for up to 5 seconds. Repeat 10 times on each side.  Standin. Wall squats. Stand with your back against  the wall. Move your feet about 12 inches away from the wall. Tighten your stomach muscles, and slowly bend your knees until they are at about a 45 degree angle. Don't go down too far. Hold about 5 seconds. Then slowly return to your starting position. Repeat 10 times.  2. Heel raises. Stand facing the wall. Slowly raise the heels of your feet up and down, while keeping your toes on the floor. If you have trouble balancing, you can touch the wall with your hands. Repeat 10 times.  More advanced exercises  When you feel comfortable enough, try these exercises.  1. Kneeling lumbar extension. Start on your hands and knees. At the same time, raise and straighten your right arm and left leg until they are parallel to the ground. Hold for 2 seconds and come back slowly to a starting position. Repeat with left arm and right leg, alternating 10 times.  2. Prone lumbar extension. Lie face down, arms extended overhead, palms on the floor. At the same time, raise your right arm and left leg as high as comfortably possible. Hold for 10 seconds and slowly return to start. Repeat with left arm and right leg, alternating 10 times. Gradually build up to 20 times. (Advanced: Repeat this exercise raising both arms and both legs a few inches off the floor at the same time. Hold for 5 seconds and release.)  3. Pelvic tilt. Lie on the floor on your back with your knees bent at 90 degrees. Your feet should be flat on the floor. Inhale, exhale, then slowly contract your abdominal muscles bringing your navel toward your spine. Let your pelvis rock back until your lower back is flat on the floor. Hold for 10 seconds while breathing smoothly.  4. Abdominal crunch. Perform a pelvic tilt (above) flattening your lower back against the floor. Holding the tension in your abdominal muscles, take another breath and raise your shoulder blades off the ground (this is not a full sit-up). Keep your head in line with your body (don t bend your neck  forward). Hold for 2 seconds, then slowly lower.  [employer] last reviewed this educational content on 8/1/2019 2000-2020 The [employer], [employer]. [address] [place] [address], [place] [address]. All rights reserved. This information is not intended as a substitute for professional medical care. Always follow your healthcare professional's instructions.           Patient Education     Understanding Lumbar Radiculopathy    Lumbar radiculopathy is irritation or inflammation of a nerve root in the low back. It causes symptoms that spread out from the back down one or both legs. To understand this condition, it helps to understand the parts of the spine:    Vertebrae. These are bones that stack to form the spine. The lumbar spine contains the 5 bottom vertebrae.    Disks. These are soft pads of tissue between the vertebrae. They act as shock absorbers for the spine.    Spinal canal. This is a tunnel formed within the stacked vertebrae. In the lumbar spine, nerves run through this canal.    Nerves. These branch off and leave the spinal canal, traveling out to parts of the body. As they leave the spinal canal, nerves pass through openings between the vertebrae. The nerve root is the part of the nerve that is closest to the spinal canal.    Sciatic nerve. This is a large nerve formed from several nerve roots in the low back. This nerve extends down the back of the leg to the foot.  With lumbar radiculopathy, nerve roots in the low back become irritated. This leads to pain and symptoms. The sciatic nerve is commonly involved, so the condition is often called sciatica.  What causes lumbar radiculopathy?  Aging, injury, poor posture, extra body weight, and other issues can lead to problems in the low back. These problems may then irritate nerve roots. They include:    Damage to a disk in the lumbar spine. The damaged disk may then press on nearby nerve roots.    Degeneration from wear and tear, and aging. This can lead to  narrowing (stenosis) of the openings between the vertebrae. The narrowed openings press on nerve roots as they leave the spinal canal.    Unstable spine. This is when a vertebra slips forward. It can then press on a nerve root.  Other, less common things can put pressure on nerves in the low back. These include diabetes, infection, or a tumor.  Symptoms of lumbar radiculopathy  These include:    Pain in the low back    Pain, numbness, tingling, or weakness that travels into the buttocks, hip, groin, or leg    Muscle spasms  Treatment for lumbar radiculopathy  In most cases, your healthcare provider will first try treatments that help relieve symptoms. These may include:    Prescription and over-the-counter pain medicines. These help relieve pain, swelling, and irritation.    Limits on positions and activities that increase pain. But lying in bed or avoiding all movement is only recommended for a short period of time.    Physical therapy, including exercises and stretches. This helps decrease pain and increase movement and function.    Steroid shots into the lower back. This may help relieve symptoms for a time.    Weight-loss program. If you are overweight, losing extra pounds may help relieve symptoms.  In some cases, you may need surgery to fix the underlying problem. This depends on the cause, the symptoms, and how long the pain has lasted.  Possible complications  Over time, an irritated and inflamed nerve may become damaged. This may lead to long-lasting (permanent) numbness or weakness in your legs and feet. If symptoms change suddenly or get worse, be sure to let your healthcare provider know.  When to call your healthcare provider  Call your healthcare provider right away if you have any of these:    New pain or pain that gets worse    New or increasing weakness, tingling, or numbness in your leg or foot    Problems controlling your bladder or bowel   [employer] last reviewed this educational content on  3/10/2016    9703-7698 [employer]. [address] [place] [address], [place] [address]. All rights reserved. This information is not intended as a substitute for professional medical care. Always follow your healthcare professional's instructions.

## 2020-12-06 ENCOUNTER — HEALTH MAINTENANCE LETTER (OUTPATIENT)
Age: 40
End: 2020-12-06

## 2021-01-01 DIAGNOSIS — K21.00 GASTROESOPHAGEAL REFLUX DISEASE WITH ESOPHAGITIS WITHOUT HEMORRHAGE: ICD-10-CM

## 2021-01-05 RX ORDER — FAMOTIDINE 20 MG/1
20 TABLET, FILM COATED ORAL DAILY
Qty: 90 TABLET | Refills: 1 | Status: SHIPPED | OUTPATIENT
Start: 2021-01-05 | End: 2021-03-18

## 2021-01-05 NOTE — TELEPHONE ENCOUNTER
Will send to provider to approve, new medication for pt on 9/23/20    -famotidine (PEPCID) 20 MG tablet; Take 1 twice per day for 30 days and then may decrease to daily.  Notify if no improvement and may need to change to omeprazole.     Dysphagia, unspecified type

## 2021-03-15 ASSESSMENT — ENCOUNTER SYMPTOMS
ABDOMINAL PAIN: 0
PALPITATIONS: 0
DYSURIA: 0
DIZZINESS: 0
CHILLS: 0
HEADACHES: 0
DIARRHEA: 0
WEAKNESS: 0
FREQUENCY: 0
COUGH: 0
MYALGIAS: 0
ARTHRALGIAS: 0
CONSTIPATION: 0
PARESTHESIAS: 0
NERVOUS/ANXIOUS: 0
BREAST MASS: 0
SHORTNESS OF BREATH: 0
HEMATOCHEZIA: 0
JOINT SWELLING: 0
NAUSEA: 0
EYE PAIN: 0
HEARTBURN: 0
FEVER: 0
SORE THROAT: 0
HEMATURIA: 0

## 2021-03-18 ENCOUNTER — OFFICE VISIT (OUTPATIENT)
Dept: FAMILY MEDICINE | Facility: CLINIC | Age: 41
End: 2021-03-18
Payer: COMMERCIAL

## 2021-03-18 VITALS
HEIGHT: 65 IN | HEART RATE: 100 BPM | RESPIRATION RATE: 20 BRPM | DIASTOLIC BLOOD PRESSURE: 74 MMHG | TEMPERATURE: 99 F | BODY MASS INDEX: 41.92 KG/M2 | WEIGHT: 251.6 LBS | SYSTOLIC BLOOD PRESSURE: 114 MMHG

## 2021-03-18 DIAGNOSIS — E03.9 ACQUIRED HYPOTHYROIDISM: ICD-10-CM

## 2021-03-18 DIAGNOSIS — Z23 NEED FOR VACCINATION: ICD-10-CM

## 2021-03-18 DIAGNOSIS — Z11.59 ENCOUNTER FOR HEPATITIS C SCREENING TEST FOR LOW RISK PATIENT: ICD-10-CM

## 2021-03-18 DIAGNOSIS — Z00.00 ROUTINE GENERAL MEDICAL EXAMINATION AT A HEALTH CARE FACILITY: Primary | ICD-10-CM

## 2021-03-18 PROCEDURE — 99213 OFFICE O/P EST LOW 20 MIN: CPT | Mod: 25 | Performed by: NURSE PRACTITIONER

## 2021-03-18 PROCEDURE — 99396 PREV VISIT EST AGE 40-64: CPT | Performed by: NURSE PRACTITIONER

## 2021-03-18 RX ORDER — LEVOTHYROXINE SODIUM 25 UG/1
25 TABLET ORAL DAILY
Qty: 30 TABLET | Refills: 0 | Status: SHIPPED | OUTPATIENT
Start: 2021-03-18 | End: 2021-04-16

## 2021-03-18 ASSESSMENT — ENCOUNTER SYMPTOMS
WHEEZING: 0
ACTIVITY CHANGE: 0
POLYDIPSIA: 0
DIFFICULTY URINATING: 0
HEADACHES: 0
SORE THROAT: 0
NERVOUS/ANXIOUS: 0
CONSTIPATION: 0
COUGH: 0
NAUSEA: 0
NUMBNESS: 0
SLEEP DISTURBANCE: 0
ARTHRALGIAS: 0
DIZZINESS: 0
DIARRHEA: 0
SHORTNESS OF BREATH: 0
CHEST TIGHTNESS: 0
VOMITING: 0
FREQUENCY: 0
RHINORRHEA: 0
ABDOMINAL PAIN: 0
LIGHT-HEADEDNESS: 0
DYSPHORIC MOOD: 0
ABDOMINAL DISTENTION: 0
FATIGUE: 0
PALPITATIONS: 0
POLYPHAGIA: 0

## 2021-03-18 ASSESSMENT — MIFFLIN-ST. JEOR: SCORE: 1812.13

## 2021-03-18 ASSESSMENT — PAIN SCALES - GENERAL: PAINLEVEL: NO PAIN (0)

## 2021-03-18 NOTE — PROGRESS NOTES
SUBJECTIVE:   CC: Jennifer Valentin is an 40 year old woman who presents for preventive health visit.     Chief Complaint   Patient presents with     Physical     pt is not fasting       Patient has been advised of split billing requirements and indicates understanding: Yes     Healthy Habits:     Getting at least 3 servings of Calcium per day:  Yes    Bi-annual eye exam:  Yes    Dental care twice a year:  Yes    Sleep apnea or symptoms of sleep apnea:  None    Diet:  Other    Frequency of exercise:  2-3 days/week    Duration of exercise:  15-30 minutes    Taking medications regularly:  Yes    Medication side effects:  Not applicable    PHQ-2 Total Score: 0    Additional concerns today:  No        Today's PHQ-2 Score:   PHQ-2 ( 1999 Pfizer) 3/15/2021   Q1: Little interest or pleasure in doing things 0   Q2: Feeling down, depressed or hopeless 0   PHQ-2 Score 0   Q1: Little interest or pleasure in doing things Not at all   Q2: Feeling down, depressed or hopeless Not at all   PHQ-2 Score 0       Abuse: Current or Past (Physical, Sexual or Emotional) - No  Do you feel safe in your environment? Yes    Have you ever done Advance Care Planning? (For example, a Health Directive, POLST, or a discussion with a medical provider or your loved ones about your wishes): No, advance care planning information given to patient to review.  Patient plans to discuss their wishes with loved ones or provider.      Social History     Tobacco Use     Smoking status: Never Smoker     Smokeless tobacco: Never Used   Substance Use Topics     Alcohol use: No         Alcohol Use 3/15/2021   Prescreen: >3 drinks/day or >7 drinks/week? No   Prescreen: >3 drinks/day or >7 drinks/week? -         Reviewed orders with patient.  Reviewed health maintenance and updated orders accordingly - Yes  Current Outpatient Medications   Medication Sig Dispense Refill     levothyroxine (SYNTHROID/LEVOTHROID) 25 MCG tablet Take 1 tablet (25 mcg) by mouth  daily 30 tablet 0     Allergies   Allergen Reactions     Flagyl [Metronidazole] Hives       Breast CA Risk Screening:  Breast CA Risk Assessment (FHS-7) 3/15/2021   Do you have a family history of breast, colon, or ovarian cancer? No / Unknown       Pertinent mammograms are reviewed under the imaging tab.    History of abnormal Pap smear: NO - age 30-65 PAP every 5 years with negative HPV co-testing recommended  PAP / HPV Latest Ref Rng & Units 2/10/2017 9/19/2013 4/1/2010   PAP - NIL NIL WARNING! - Addended   HPV 16 DNA NEG Negative - -   HPV 18 DNA NEG Negative - -   OTHER HR HPV NEG Negative - -     Reviewed and updated as needed this visit by clinical staff  Tobacco  Allergies  Meds   Med Hx  Surg Hx  Fam Hx  Soc Hx        Reviewed and updated as needed this visit by Provider                  Here today for physical.  Is not fasting today for labs.  Needs to have refills of levothyroxine.  Feels like is doing well on current dose.  Now that is getting warmer has been trying to be more active outside.    Is scheduled to have first dose of Covid vaccine tomorrow.      Last Pap: 2017 normal, neg HPV. Hyst due to heavy bleeding.   Last mammogram: 9/2020.  No family history of breast cancer.   Last BMD: N/A  Last Colonoscopy: 2014-normal. No family history of colon cancer.   Last eye exam: yearly   Last dental exam: Every 6 mo  Last tetanus vaccine: 2011-plans to get updated at pharmacy. Getting COVID  Vaccine tomorrow, will defer at this time.   Last influenza vaccine: Oct 2020  Last shingles vaccine: N/A  Last pneumonia vaccine: N/A  Hep C screen (born [id number]):  Plans to do here today  HIV screen: was with pregnancies-negative  AAA screen (age 65-78 with smoking hx): N/A  IVD (HTN, Hyperlipid, Smoking): N/A  Lung CA screening (55-80, 30 pk smoking hx): N/A    Review of Systems   Constitutional: Negative for activity change and fatigue.   HENT: Negative for ear pain, rhinorrhea and sore throat.   "  Respiratory: Negative for cough, chest tightness, shortness of breath and wheezing.    Cardiovascular: Negative for chest pain and palpitations.   Gastrointestinal: Negative for abdominal distention, abdominal pain, constipation, diarrhea, nausea and vomiting.   Endocrine: Negative for cold intolerance, heat intolerance, polydipsia, polyphagia and polyuria.   Genitourinary: Negative for difficulty urinating, enuresis, frequency and urgency.   Musculoskeletal: Negative for arthralgias.   Skin: Negative for rash.   Neurological: Negative for dizziness, light-headedness, numbness and headaches.   Psychiatric/Behavioral: Negative for dysphoric mood and sleep disturbance. The patient is not nervous/anxious.           OBJECTIVE:   /74 (BP Location: Left arm, Patient Position: Sitting, Cuff Size: Adult Large)   Pulse 100   Temp 99  F (37.2  C) (Tympanic)   Resp 20   Ht 1.651 m (5' 5\")   Wt 114.1 kg (251 lb 9.6 oz)   LMP 11/20/2019   BMI 41.87 kg/m    Physical Exam  Constitutional:       Appearance: Normal appearance. She is well-developed.   HENT:      Head: Normocephalic and atraumatic.      Right Ear: Tympanic membrane and external ear normal. No middle ear effusion.      Left Ear: Tympanic membrane and external ear normal.  No middle ear effusion.      Nose: No mucosal edema.      Mouth/Throat:      Pharynx: Uvula midline.   Eyes:      Pupils: Pupils are equal, round, and reactive to light.   Neck:      Musculoskeletal: Normal range of motion.      Thyroid: No thyromegaly.      Vascular: No carotid bruit.   Cardiovascular:      Rate and Rhythm: Normal rate and regular rhythm.      Pulses:           Femoral pulses are 2+ on the right side and 2+ on the left side.     Heart sounds: Normal heart sounds.   Pulmonary:      Effort: Pulmonary effort is normal.      Breath sounds: Normal breath sounds.   Abdominal:      General: Bowel sounds are normal.      Palpations: Abdomen is soft.      Tenderness: There is " "no abdominal tenderness.   Musculoskeletal: Normal range of motion.   Skin:     General: Skin is warm and dry.      Findings: No rash.   Neurological:      Mental Status: She is alert.   Psychiatric:         Behavior: Behavior normal.         ASSESSMENT/PLAN:   1. Routine general medical examination at a health care facility  Screening guidelines reviewed.   We will set up for fasting lab appointment  - Lipid panel reflex to direct LDL Fasting; Future  - Comprehensive metabolic panel; Future    2. Need for vaccination  Encouraged to get Tdap vaccine at pharmacy 2 weeks after second dose of Covid vaccine.    3. Acquired hypothyroidism  Stable-doing well on current dose.  We will set up for return lab appointment.  We will send further refills once labs return.  - levothyroxine (SYNTHROID/LEVOTHROID) 25 MCG tablet; Take 1 tablet (25 mcg) by mouth daily  Dispense: 30 tablet; Refill: 0  - TSH with free T4 reflex; Future    4. Encounter for hepatitis C screening test for low risk patient  - Hepatitis C Screen Reflex to HCV RNA Quant and Genotype; Future    Patient has been advised of split billing requirements and indicates understanding: Yes  COUNSELING:  Reviewed preventive health counseling, as reflected in patient instructions       Regular exercise       Healthy diet/nutrition       Immunizations    We will defer Tdap vaccine at this time due to getting Covid vaccine tomorrow.           Colon cancer screening       Consider Hep C screening for all patients one time for ages 18-79 years    Estimated body mass index is 41.87 kg/m  as calculated from the following:    Height as of this encounter: 1.651 m (5' 5\").    Weight as of this encounter: 114.1 kg (251 lb 9.6 oz).    Weight management plan: Discussed healthy diet and exercise guidelines    She reports that she has never smoked. She has never used smokeless tobacco.      Counseling Resources:  ATP IV Guidelines  Pooled Cohorts Equation Calculator  Breast Cancer " Risk Calculator  BRCA-Related Cancer Risk Assessment: FHS-7 Tool  FRAX Risk Assessment  ICSI Preventive Guidelines  Dietary Guidelines for Americans, 2010  USDA's MyPlate  ASA Prophylaxis  Lung CA Screening    NEO Stone CNP  [facility]

## 2021-04-09 DIAGNOSIS — Z11.59 ENCOUNTER FOR HEPATITIS C SCREENING TEST FOR LOW RISK PATIENT: ICD-10-CM

## 2021-04-09 DIAGNOSIS — Z00.00 ROUTINE GENERAL MEDICAL EXAMINATION AT A HEALTH CARE FACILITY: ICD-10-CM

## 2021-04-09 DIAGNOSIS — E03.9 ACQUIRED HYPOTHYROIDISM: ICD-10-CM

## 2021-04-09 LAB
ALBUMIN SERPL-MCNC: 3.5 G/DL (ref 3.4–5)
ALP SERPL-CCNC: 99 U/L (ref 40–150)
ALT SERPL W P-5'-P-CCNC: 44 U/L (ref 0–50)
ANION GAP SERPL CALCULATED.3IONS-SCNC: 3 MMOL/L (ref 3–14)
AST SERPL W P-5'-P-CCNC: 24 U/L (ref 0–45)
BILIRUB SERPL-MCNC: 1 MG/DL (ref 0.2–1.3)
BUN SERPL-MCNC: 14 MG/DL (ref 7–30)
CALCIUM SERPL-MCNC: 8.8 MG/DL (ref 8.5–10.1)
CHLORIDE SERPL-SCNC: 108 MMOL/L (ref 94–109)
CHOLEST SERPL-MCNC: 205 MG/DL
CO2 SERPL-SCNC: 28 MMOL/L (ref 20–32)
CREAT SERPL-MCNC: 0.7 MG/DL (ref 0.52–1.04)
GFR SERPL CREATININE-BSD FRML MDRD: >90 ML/MIN/{1.73_M2}
GLUCOSE SERPL-MCNC: 93 MG/DL (ref 70–99)
HCV AB SERPL QL IA: NONREACTIVE
HDLC SERPL-MCNC: 44 MG/DL
LDLC SERPL CALC-MCNC: 137 MG/DL
NONHDLC SERPL-MCNC: 161 MG/DL
POTASSIUM SERPL-SCNC: 4.5 MMOL/L (ref 3.4–5.3)
PROT SERPL-MCNC: 7.7 G/DL (ref 6.8–8.8)
SODIUM SERPL-SCNC: 139 MMOL/L (ref 133–144)
TRIGL SERPL-MCNC: 118 MG/DL
TSH SERPL DL<=0.005 MIU/L-ACNC: 3.02 MU/L (ref 0.4–4)

## 2021-04-09 PROCEDURE — 84443 ASSAY THYROID STIM HORMONE: CPT | Performed by: NURSE PRACTITIONER

## 2021-04-09 PROCEDURE — 80053 COMPREHEN METABOLIC PANEL: CPT | Performed by: NURSE PRACTITIONER

## 2021-04-09 PROCEDURE — 36415 COLL VENOUS BLD VENIPUNCTURE: CPT | Performed by: NURSE PRACTITIONER

## 2021-04-09 PROCEDURE — 80061 LIPID PANEL: CPT | Performed by: NURSE PRACTITIONER

## 2021-04-09 PROCEDURE — 86803 HEPATITIS C AB TEST: CPT | Performed by: NURSE PRACTITIONER

## 2021-04-15 DIAGNOSIS — E03.9 ACQUIRED HYPOTHYROIDISM: ICD-10-CM

## 2021-04-16 RX ORDER — LEVOTHYROXINE SODIUM 25 UG/1
TABLET ORAL
Qty: 90 TABLET | Refills: 1 | Status: SHIPPED | OUTPATIENT
Start: 2021-04-16 | End: 2021-10-03

## 2021-07-05 ENCOUNTER — OFFICE VISIT (OUTPATIENT)
Dept: FAMILY MEDICINE | Facility: CLINIC | Age: 41
End: 2021-07-05
Payer: COMMERCIAL

## 2021-07-05 VITALS
WEIGHT: 250.31 LBS | HEART RATE: 78 BPM | DIASTOLIC BLOOD PRESSURE: 80 MMHG | SYSTOLIC BLOOD PRESSURE: 118 MMHG | HEIGHT: 63 IN | OXYGEN SATURATION: 99 % | TEMPERATURE: 97.6 F | RESPIRATION RATE: 18 BRPM | BODY MASS INDEX: 44.35 KG/M2

## 2021-07-05 DIAGNOSIS — R21 RASH: Primary | ICD-10-CM

## 2021-07-05 PROCEDURE — 99213 OFFICE O/P EST LOW 20 MIN: CPT | Performed by: PHYSICIAN ASSISTANT

## 2021-07-05 RX ORDER — CETIRIZINE HYDROCHLORIDE 10 MG/1
10 TABLET ORAL DAILY
Qty: 30 TABLET | Refills: 0 | Status: SHIPPED | OUTPATIENT
Start: 2021-07-05 | End: 2022-02-28

## 2021-07-05 ASSESSMENT — MIFFLIN-ST. JEOR: SCORE: 1774.54

## 2021-07-05 NOTE — PROGRESS NOTES
"    Assessment & Plan     Rash  Patient is a 40-year-old female who presents to clinic due to 4/5 days of facial rash.  Rash started after using new face lotion.  Vital signs normal.   No intraoral, perioral, or facial swelling present to suggest anaphylaxis. Rash is most consistent with contact dermatitis from new face lotion.  Recommended discontinue use of this as well as make-up until rash improves.  Zyrtec prescribed for itching.  Recommended using gentle unscented facial products.  Return precautions provided.  - cetirizine (ZYRTEC) 10 MG tablet; Take 1 tablet (10 mg) by mouth daily       See Patient Instructions    Return in about 2 weeks (around 7/19/2021), or if symptoms worsen or fail to improve.    HERACLIO Johnson [facility] ASTRID Rodriguez is a 40 year old who presents for the following health issues     HPI     Rash  Onset/Duration: 4-5 days. Started after using new face lotion.  Rash is specific to face only.  No facial swelling, wheezing, cough, difficulty breathing.  Description  Location: Eye lids, lips and cheeks  Character: round, blotchy, raised, red, dry  Itching: mild  Intensity:  moderate  Progression of Symptoms:  same  Accompanying signs and symptoms:   Fever: no  Body aches or joint pain: no  Sore throat symptoms: no  Recent cold symptoms: no  History:           Previous episodes of similar rash: during childhood  New exposures:  New lotion  Recent travel: no  Exposure to similar rash: no  Precipitating or alleviating factors: None  Therapies tried and outcome: Neosporin, OTC lotion          Objective    /80   Pulse 78   Temp 97.6  F (36.4  C) (Tympanic)   Resp 18   Ht 1.6 m (5' 3\")   Wt 113.5 kg (250 lb 5 oz)   LMP 11/20/2019   SpO2 99%   BMI 44.34 kg/m    Body mass index is 44.34 kg/m .  Physical Exam  Vitals signs and nursing note reviewed.   Constitutional:       General: She is not in acute distress.     Appearance: Normal appearance. "   HENT:      Head: Normocephalic and atraumatic.      Comments: Mild erythematous maculopapular rash surrounding eyes and diffusely across face.  Rash is located to face specifically.     Mouth/Throat:      Mouth: Mucous membranes are moist.      Pharynx: Oropharynx is clear.      Comments: No perioral or intraoral swelling  Eyes:      Extraocular Movements: Extraocular movements intact.      Pupils: Pupils are equal, round, and reactive to light.   Neck:      Musculoskeletal: Normal range of motion.   Cardiovascular:      Rate and Rhythm: Normal rate and regular rhythm.      Heart sounds: Normal heart sounds.   Pulmonary:      Effort: Pulmonary effort is normal.      Breath sounds: Normal breath sounds. No wheezing.   Musculoskeletal: Normal range of motion.   Lymphadenopathy:      Cervical: No cervical adenopathy.   Skin:     General: Skin is warm and dry.   Neurological:      General: No focal deficit present.      Mental Status: She is alert.   Psychiatric:         Mood and Affect: Mood normal.         Behavior: Behavior normal.

## 2021-07-05 NOTE — PATIENT INSTRUCTIONS
Your rash is likely due to the recent new facial product you tried.  Please do not use this product going forward.  For itching you been prescribed Zyrtec.  This will also help calm your body's response to the irritating facial products.    Avoid any facial products with scents alcohol while the rash heals.  You can try CeraVe which is a brand that has very gentle facial products if needed.  Additionally, I would recommend avoiding make-up while the rash heals.  I would expect the rash to go away on its own within the next 7-10 days.    Please reach out with questions or concerns.  Return to clinic for any new or worsening symptoms.

## 2021-09-25 ENCOUNTER — HEALTH MAINTENANCE LETTER (OUTPATIENT)
Age: 41
End: 2021-09-25

## 2021-09-30 DIAGNOSIS — E03.9 ACQUIRED HYPOTHYROIDISM: ICD-10-CM

## 2021-10-03 RX ORDER — LEVOTHYROXINE SODIUM 25 UG/1
TABLET ORAL
Qty: 90 TABLET | Refills: 0 | Status: SHIPPED | OUTPATIENT
Start: 2021-10-03 | End: 2021-11-06

## 2021-10-08 ENCOUNTER — HOSPITAL ENCOUNTER (OUTPATIENT)
Dept: MAMMOGRAPHY | Facility: CLINIC | Age: 41
Discharge: HOME | End: 2021-10-08
Attending: NURSE PRACTITIONER | Admitting: NURSE PRACTITIONER
Payer: COMMERCIAL

## 2021-10-08 DIAGNOSIS — Z12.31 VISIT FOR SCREENING MAMMOGRAM: ICD-10-CM

## 2021-10-08 PROCEDURE — 77067 SCR MAMMO BI INCL CAD: CPT

## 2021-10-26 ENCOUNTER — OFFICE VISIT (OUTPATIENT)
Dept: FAMILY MEDICINE | Facility: CLINIC | Age: 41
End: 2021-10-26
Payer: COMMERCIAL

## 2021-10-26 VITALS
OXYGEN SATURATION: 100 % | TEMPERATURE: 98.6 F | BODY MASS INDEX: 42.51 KG/M2 | WEIGHT: 240 LBS | RESPIRATION RATE: 16 BRPM | DIASTOLIC BLOOD PRESSURE: 76 MMHG | HEART RATE: 104 BPM | SYSTOLIC BLOOD PRESSURE: 112 MMHG

## 2021-10-26 DIAGNOSIS — R05.9 COUGH: ICD-10-CM

## 2021-10-26 DIAGNOSIS — R09.81 NASAL CONGESTION: ICD-10-CM

## 2021-10-26 DIAGNOSIS — Z11.52 ENCOUNTER FOR SCREENING LABORATORY TESTING FOR COVID-19 VIRUS: Primary | ICD-10-CM

## 2021-10-26 LAB
FLUAV AG SPEC QL IA: NEGATIVE
FLUBV AG SPEC QL IA: NEGATIVE
SARS-COV-2 RNA RESP QL NAA+PROBE: NEGATIVE

## 2021-10-26 PROCEDURE — 99213 OFFICE O/P EST LOW 20 MIN: CPT | Performed by: PHYSICIAN ASSISTANT

## 2021-10-26 PROCEDURE — U0003 INFECTIOUS AGENT DETECTION BY NUCLEIC ACID (DNA OR RNA); SEVERE ACUTE RESPIRATORY SYNDROME CORONAVIRUS 2 (SARS-COV-2) (CORONAVIRUS DISEASE [COVID-19]), AMPLIFIED PROBE TECHNIQUE, MAKING USE OF HIGH THROUGHPUT TECHNOLOGIES AS DESCRIBED BY CMS-2020-01-R: HCPCS | Performed by: PHYSICIAN ASSISTANT

## 2021-10-26 PROCEDURE — 87804 INFLUENZA ASSAY W/OPTIC: CPT | Performed by: PHYSICIAN ASSISTANT

## 2021-10-26 PROCEDURE — U0005 INFEC AGEN DETEC AMPLI PROBE: HCPCS | Performed by: PHYSICIAN ASSISTANT

## 2021-10-26 ASSESSMENT — PAIN SCALES - GENERAL: PAINLEVEL: NO PAIN (0)

## 2021-10-26 NOTE — PROGRESS NOTES
Assessment & Plan     Encounter for screening laboratory testing for COVID-19 virus  Nasal congestion  Cough  Patient is a 41-year-old female who presents to clinic due to 4 days of ear pain and sinus congestion.  Patient notes recent travel.  Vital signs significant for mild tachycardia.  Physical exam without signs of OE/OM.  Symptoms are most likely due to viral URI.  Will complete COVID-19 and influenza screening.  Discussed management with rest, Tylenol, hydration, and decongestants.  If COVID-19 positive, recommended quarantine per CDC guidelines.  Return precautions provided.  - Symptomatic COVID-19 Virus (Coronavirus) by PCR Nose; Future  - Influenza A/B antigen    See Patient Instructions    Return in about 1 week (around 11/2/2021), or if symptoms worsen or fail to improve.    HERACL[facility] ASTRID Rodriguez is a 41 year old who presents for the following health issues     HPI     Acute Illness  Acute illness concerns: Left ear pain and sinus congestion. No history of ear infections or sinus infections.   Onset/Duration: 4 days  Symptoms:  Fever: no  Chills/Sweats: no  Headache (location?): no  Sinus Pressure: YES  Conjunctivitis:  no  Ear Pain: YES: left  Rhinorrhea: YES  Congestion: YES  Sore Throat: no  Cough: Yes  Wheeze: no  Decreased Appetite: no  Nausea: no  Vomiting: no  Diarrhea: no  Dysuria/Freq.: no  Dysuria or Hematuria: no  Fatigue/Achiness: no  Sick/Strep Exposure: YES- Recent travel to [place]. Return 6 days ago.   Therapies tried and outcome: Tylenol Cold          Objective    /76   Pulse 104   Temp 98.6  F (37  C) (Tympanic)   Resp 16   Wt 108.9 kg (240 lb)   LMP 11/20/2019 (LMP Unknown)   SpO2 100%   Breastfeeding No   BMI 42.51 kg/m    Body mass index is 42.51 kg/m .  Physical Exam  Vitals and nursing note reviewed.   Constitutional:       General: She is not in acute distress.     Appearance: Normal appearance.   HENT:      Head:  Normocephalic and atraumatic.      Right Ear: Tympanic membrane, ear canal and external ear normal. There is no impacted cerumen.      Left Ear: Tympanic membrane, ear canal and external ear normal. There is no impacted cerumen.      Nose: Congestion present. No rhinorrhea.      Mouth/Throat:      Mouth: Mucous membranes are moist.      Pharynx: Oropharynx is clear. No oropharyngeal exudate or posterior oropharyngeal erythema.   Eyes:      Extraocular Movements: Extraocular movements intact.      Pupils: Pupils are equal, round, and reactive to light.   Cardiovascular:      Rate and Rhythm: Normal rate and regular rhythm.      Heart sounds: Normal heart sounds.   Pulmonary:      Effort: Pulmonary effort is normal.      Breath sounds: Normal breath sounds. No wheezing, rhonchi or rales.   Musculoskeletal:         General: Normal range of motion.      Cervical back: Normal range of motion.   Skin:     General: Skin is warm and dry.   Neurological:      General: No focal deficit present.      Mental Status: She is alert.   Psychiatric:         Mood and Affect: Mood normal.         Behavior: Behavior normal.

## 2021-10-26 NOTE — LETTER
October 26, 2021      Jennifer Valentin  [address] [facility] [id number]        To Whom It May Concern:    Jennifer Valentin was seen in our clinic. She may return to work with symptoms improvement and negative results or per CDC guidelines without restrictions.      Sincerely,        Makenzie Cuenca PA-C

## 2021-10-26 NOTE — PATIENT INSTRUCTIONS
Your symptoms are concerning for viral upper respiratory infection.  We are completing COVID-19 and influenza testing today.  Results will be sent directly to your MyChart.  There are no antibiotics to treat viruses.  For management please make sure to get plenty of rest, stay hydrated, and you can use over-the-counter medications such as Tylenol, Tylenol Cold, or Sudafed.  Most viruses run their course within 1-2 weeks.  All viruses are contagious, so please make sure to prevent viral spread.  If your COVID-19 positive, follow CDC guidelines.    Please reach out with questions or concerns.  Return to clinic for new or worsening symptoms.      Patient Education     Viral Upper Respiratory Illness (Adult)  You have a viral upper respiratory illness (URI), which is another term for the common cold. This illness is contagious during the first few days. It is spread through the air by coughing and sneezing. It may also be spread by direct contact (touching the sick person and then touching your own eyes, nose, or mouth). Frequent handwashing will decrease risk of spread. Most viral illnesses go away within 7 to 10 days with rest and simple home remedies. Sometimes the illness may last for several weeks. Antibiotics will not kill a virus, and they are generally not prescribed for this condition.  Home care    If symptoms are severe, rest at home for the first 2 to 3 days. When you resume activity, don't let yourself get too tired.    Don't smoke. If you need help stopping, talk with your healthcare provider.    Avoid being exposed to cigarette smoke (yours or others ).    You may use acetaminophen or ibuprofen to control pain and fever, unless another medicine was prescribed. If you have chronic liver or kidney disease, have ever had a stomach ulcer or gastrointestinal bleeding, or are taking blood-thinning medicines, talk with your healthcare provider before using these medicines. Aspirin should never be given to anyone  under 18 years of age who is ill with a viral infection or fever. It may cause severe liver or brain damage.    Your appetite may be poor, so a light diet is fine. Stay well hydrated by drinking 6 to 8 glasses of fluids per day (water, soft drinks, juices, tea, or soup). Extra fluids will help loosen secretions in the nose and lungs.    Over-the-counter cold medicines will not shorten the length of time you re sick, but they may be helpful for the following symptoms: cough, sore throat, and nasal and sinus congestion. If you take prescription medicines, ask your healthcare provider or pharmacist which over-the-counter medicines are safe to use. (Note: Don't use decongestants if you have high blood pressure.)  Follow-up care  Follow up with your healthcare provider, or as advised.  When to seek medical advice  Call your healthcare provider right away if any of these occur:    Cough with lots of colored sputum (mucus)    Severe headache; face, neck, or ear pain    Difficulty swallowing due to throat pain    Fever of 100.4 F (38 C) or higher, or as directed by your healthcare provider  Call 911  Call 911 if any of these occur:    Chest pain, shortness of breath, wheezing, or difficulty breathing    Coughing up blood    Very severe pain with swallowing, especially if it goes along with a muffled voice   [employer] last reviewed this educational content on 6/1/2018 2000-2021 [employer]. All rights reserved. This information is not intended as a substitute for professional medical care. Always follow your healthcare professional's instructions.

## 2021-11-06 ENCOUNTER — MYC REFILL (OUTPATIENT)
Dept: FAMILY MEDICINE | Facility: CLINIC | Age: 41
End: 2021-11-06
Payer: COMMERCIAL

## 2021-11-06 DIAGNOSIS — E03.9 ACQUIRED HYPOTHYROIDISM: ICD-10-CM

## 2021-11-09 RX ORDER — LEVOTHYROXINE SODIUM 25 UG/1
25 TABLET ORAL DAILY
Qty: 90 TABLET | Refills: 0 | Status: SHIPPED | OUTPATIENT
Start: 2021-11-09 | End: 2022-02-01

## 2021-11-09 NOTE — TELEPHONE ENCOUNTER
Prescription approved per [facility] Refill Protocol.     NEO Hutchinson CNP   4/12/2021  6:48 AM LALO Rodriguez,      You are negative for hepatitis C.  Your thyroid function is normal.  Your electrolytes are normal range.  Your kidney function is normal.  Your blood sugar is normal.  Your liver function is normal.     Your total cholesterol and bad cholesterol (LDL) is high. You need to try and increase your activity, 30 minutes 3-4 times per week and have a lower saturated fat diet. The Mediterranean diet has been beneficial. While there is no single definition of the Mediterranean diet, it is typically high in vegetables, fruits, whole grains, healthy fats, beans, nut and seeds, and olive oil.  Weekly intake of fish, poultry, beans and eggs with moderate portions of dairy products and limited red meats. Plan to recheck fasting lipids again in 12 months.      Julissa [facility]

## 2022-01-31 DIAGNOSIS — E03.9 ACQUIRED HYPOTHYROIDISM: ICD-10-CM

## 2022-02-01 RX ORDER — LEVOTHYROXINE SODIUM 25 UG/1
25 TABLET ORAL DAILY
Qty: 90 TABLET | Refills: 0 | Status: SHIPPED | OUTPATIENT
Start: 2022-02-01 | End: 2022-02-28

## 2022-02-28 ENCOUNTER — OFFICE VISIT (OUTPATIENT)
Dept: FAMILY MEDICINE | Facility: CLINIC | Age: 42
End: 2022-02-28
Payer: COMMERCIAL

## 2022-02-28 VITALS
WEIGHT: 257.25 LBS | SYSTOLIC BLOOD PRESSURE: 126 MMHG | RESPIRATION RATE: 14 BRPM | HEIGHT: 63 IN | HEART RATE: 76 BPM | OXYGEN SATURATION: 98 % | TEMPERATURE: 98 F | DIASTOLIC BLOOD PRESSURE: 81 MMHG | BODY MASS INDEX: 45.58 KG/M2

## 2022-02-28 DIAGNOSIS — Z13.1 SCREENING FOR DIABETES MELLITUS: ICD-10-CM

## 2022-02-28 DIAGNOSIS — E03.9 ACQUIRED HYPOTHYROIDISM: ICD-10-CM

## 2022-02-28 DIAGNOSIS — E78.5 HYPERLIPIDEMIA LDL GOAL <100: ICD-10-CM

## 2022-02-28 DIAGNOSIS — E66.01 CLASS 3 SEVERE OBESITY DUE TO EXCESS CALORIES WITHOUT SERIOUS COMORBIDITY WITH BODY MASS INDEX (BMI) OF 45.0 TO 49.9 IN ADULT (H): ICD-10-CM

## 2022-02-28 DIAGNOSIS — M67.431 GANGLION CYST OF WRIST, RIGHT: Primary | ICD-10-CM

## 2022-02-28 DIAGNOSIS — E66.813 CLASS 3 SEVERE OBESITY DUE TO EXCESS CALORIES WITHOUT SERIOUS COMORBIDITY WITH BODY MASS INDEX (BMI) OF 45.0 TO 49.9 IN ADULT (H): ICD-10-CM

## 2022-02-28 LAB
CHOLEST SERPL-MCNC: 199 MG/DL
FASTING STATUS PATIENT QL REPORTED: YES
FASTING STATUS PATIENT QL REPORTED: YES
GLUCOSE BLD-MCNC: 96 MG/DL (ref 70–99)
HDLC SERPL-MCNC: 45 MG/DL
LDLC SERPL CALC-MCNC: 131 MG/DL
NONHDLC SERPL-MCNC: 154 MG/DL
TRIGL SERPL-MCNC: 113 MG/DL
TSH SERPL DL<=0.005 MIU/L-ACNC: 2.82 MU/L (ref 0.4–4)

## 2022-02-28 PROCEDURE — 36415 COLL VENOUS BLD VENIPUNCTURE: CPT | Performed by: NURSE PRACTITIONER

## 2022-02-28 PROCEDURE — 82947 ASSAY GLUCOSE BLOOD QUANT: CPT | Performed by: NURSE PRACTITIONER

## 2022-02-28 PROCEDURE — 99213 OFFICE O/P EST LOW 20 MIN: CPT | Performed by: NURSE PRACTITIONER

## 2022-02-28 PROCEDURE — 80061 LIPID PANEL: CPT | Performed by: NURSE PRACTITIONER

## 2022-02-28 PROCEDURE — 84443 ASSAY THYROID STIM HORMONE: CPT | Performed by: NURSE PRACTITIONER

## 2022-02-28 RX ORDER — LEVOTHYROXINE SODIUM 25 UG/1
25 TABLET ORAL DAILY
Qty: 90 TABLET | Refills: 3 | Status: SHIPPED | OUTPATIENT
Start: 2022-02-28 | End: 2023-02-01

## 2022-02-28 ASSESSMENT — ENCOUNTER SYMPTOMS
PALPITATIONS: 0
SORE THROAT: 0
ARTHRALGIAS: 0
NUMBNESS: 0
LIGHT-HEADEDNESS: 0
FATIGUE: 0
DIARRHEA: 0
NAUSEA: 0
COUGH: 0
MYALGIAS: 0
SLEEP DISTURBANCE: 0
CHEST TIGHTNESS: 0
WHEEZING: 0
ABDOMINAL DISTENTION: 0
NERVOUS/ANXIOUS: 0
CONSTIPATION: 0
ABDOMINAL PAIN: 0
VOMITING: 0
HEADACHES: 0
DYSPHORIC MOOD: 0
RHINORRHEA: 0
SHORTNESS OF BREATH: 0
DIZZINESS: 0

## 2022-02-28 ASSESSMENT — PAIN SCALES - GENERAL: PAINLEVEL: NO PAIN (0)

## 2022-02-28 NOTE — PROGRESS NOTES
SUBJECTIVE:   CC: Jennifer Valentin is an 41 year old woman who presents for preventive health visit.       Patient has been advised of split billing requirements and indicates understanding: Yes  History of Present Illness     Reason for visit:  Prescription renewal and yearly check  Symptoms include:  No symptoms    She eats 2-3 servings of fruits and vegetables daily.She consumes 1 sweetened beverage(s) daily.She exercises with enough effort to increase her heart rate 20 to 29 minutes per day.  She exercises with enough effort to increase her heart rate 3 or less days per week.       Today's PHQ-2 Score:   PHQ-2 ( 1999 Pfizer) 2/28/2022   Q1: Little interest or pleasure in doing things 0   Q2: Feeling down, depressed or hopeless 0   PHQ-2 Score 0   PHQ-2 Total Score (12-17 Years)- Positive if 3 or more points; Administer PHQ-A if positive -   Q1: Little interest or pleasure in doing things Not at all   Q2: Feeling down, depressed or hopeless Not at all   PHQ-2 Score 0       Abuse: Current or Past (Physical, Sexual or Emotional) - No  Do you feel safe in your environment? Yes        Social History     Tobacco Use     Smoking status: Never Smoker     Smokeless tobacco: Never Used   Substance Use Topics     Alcohol use: No         Alcohol Use 3/15/2021   Prescreen: >3 drinks/day or >7 drinks/week? No   Prescreen: >3 drinks/day or >7 drinks/week? -       Reviewed orders with patient.  Reviewed health maintenance and updated orders accordingly - Yes  Current Outpatient Medications   Medication Sig Dispense Refill     levothyroxine (SYNTHROID/LEVOTHROID) 25 MCG tablet Take 1 tablet (25 mcg) by mouth daily Due for physical for further refills. 90 tablet 0     Allergies   Allergen Reactions     Flagyl [Metronidazole] Hives       Breast Cancer Screening:  Any new diagnosis of family breast, ovarian, or bowel cancer?     Breast CA Risk Assessment (FHS-7) 3/15/2021   Do you have a family history of breast, colon, or  "ovarian cancer? No / Unknown       Pertinent mammograms are reviewed under the imaging tab.    History of abnormal Pap smear:   PAP / HPV Latest Ref Rng & Units 2/10/2017 9/19/2013 4/1/2010   PAP (Historical) - NIL NIL WARNING! - Addended   HPV16 NEG Negative - -   HPV18 NEG Negative - -   HRHPV NEG Negative - -     Reviewed and updated as needed this visit by clinical staff   Tobacco  Allergies  Meds              Reviewed and updated as needed this visit by Provider                      Here today for medication check.  Is fasting this morning for labs. Recently returned from a cruise to [place] and [place].  Has been taking levothyroxine daily and tolerating well.  No negative side effects.    Has lump to right wrist. If uses right wrist a lot it will get bigger. Is right handed. No numbness or tingling to fingers. Did have surgery to this wrist about 1 year ago for torn cartilage. Will hurt the more that uses it.     Review of Systems   Constitutional: Negative for fatigue.   HENT: Negative for ear pain, rhinorrhea and sore throat.    Eyes: Negative for visual disturbance.   Respiratory: Negative for cough, chest tightness, shortness of breath and wheezing.    Cardiovascular: Negative for chest pain and palpitations.   Gastrointestinal: Negative for abdominal distention, abdominal pain, constipation, diarrhea, nausea and vomiting.   Endocrine: Negative for cold intolerance and heat intolerance.   Musculoskeletal: Negative for arthralgias and myalgias.        Bump right wrist   Skin: Negative for rash.   Neurological: Negative for dizziness, light-headedness, numbness and headaches.   Psychiatric/Behavioral: Negative for dysphoric mood and sleep disturbance. The patient is not nervous/anxious.         OBJECTIVE:   /81   Pulse 76   Temp 98  F (36.7  C) (Tympanic)   Resp 14   Ht 1.6 m (5' 3\")   Wt 116.7 kg (257 lb 4 oz)   LMP 11/20/2019 (LMP Unknown)   SpO2 98%   Breastfeeding No   BMI 45.57 kg/m  "   Physical Exam  Constitutional:       Appearance: Normal appearance. She is well-developed.   HENT:      Head: Normocephalic and atraumatic.      Right Ear: Tympanic membrane and external ear normal. No middle ear effusion.      Left Ear: Tympanic membrane and external ear normal.  No middle ear effusion.      Nose: No mucosal edema.   Neck:      Thyroid: No thyromegaly.      Vascular: No carotid bruit.   Cardiovascular:      Rate and Rhythm: Normal rate and regular rhythm.      Heart sounds: Normal heart sounds.   Pulmonary:      Effort: Pulmonary effort is normal.      Breath sounds: Normal breath sounds.   Abdominal:      General: Bowel sounds are normal.      Palpations: Abdomen is soft.   Musculoskeletal:        Arms:    Skin:     General: Skin is warm and dry.   Neurological:      Mental Status: She is alert.   Psychiatric:         Behavior: Behavior normal.         ASSESSMENT/PLAN:   Ganglion cyst of wrist, right  Plans to follow-up with established orthopedist at [facility] orthopedic    Class 3 severe obesity due to excess calories without serious comorbidity with body mass index (BMI) of 45.0 to 49.9 in adult (H)  Encouraged to remain active and monitor portion sizes    Hyperlipidemia LDL goal <100  We will recheck here today  - Lipid panel reflex to direct LDL Fasting; Future  - Lipid panel reflex to direct LDL Fasting    Screening for diabetes mellitus  - Glucose; Future  - Glucose    Acquired hypothyroidism  We will recheck here today.  We will send refills of medication when labs return.  - TSH with free T4 reflex; Future  - TSH with free T4 reflex        COUNSELING:  Reviewed preventive health counseling, as reflected in patient instructions       Regular exercise       Healthy diet/nutrition       Immunizations    Up-to-date           Safe sex practices/STD prevention       Colorectal Cancer Screening       Consider Hep C screening for all patients one time for ages 18-79 years       HIV screening:  "1x in teen years, 1x in adult years, and at intervals if high risk    Estimated body mass index is 45.57 kg/m  as calculated from the following:    Height as of this encounter: 1.6 m (5' 3\").    Weight as of this encounter: 116.7 kg (257 lb 4 oz).    Weight management plan: Discussed healthy diet and exercise guidelines    She reports that she has never smoked. She has never used smokeless tobacco.      Counseling Resources:  ATP IV Guidelines  Pooled Cohorts Equation Calculator  Breast Cancer Risk Calculator  BRCA-Related Cancer Risk Assessment: FHS-7 Tool  FRAX Risk Assessment  ICSI Preventive Guidelines  Dietary Guidelines for Americans, 2010  USDA's MyPlate  ASA Prophylaxis  Lung CA Screening    NEO Stone CNP  M [facility] ASTRID  "

## 2022-02-28 NOTE — PROGRESS NOTES
{PROVIDER CHARTING PREFERENCE:145709}    Nancy Rodriguez is a 41 year old who presents for the following health issues {ACCOMPANIED BY STATEMENT (Optional):154041}    History of Present Illness     Reason for visit:  Prescription renewal and yearly check  Symptoms include:  No symptoms    She eats 2-3 servings of fruits and vegetables daily.She consumes 1 sweetened beverage(s) daily.She exercises with enough effort to increase her heart rate 20 to 29 minutes per day.  She exercises with enough effort to increase her heart rate 3 or less days per week.   She is taking medications regularly.       {SUPERLIST (Optional):578274}  {additonal problems for provider to add (Optional):931141}    Review of Systems   {ROS COMP (Optional):642929}      Objective    LMP 11/20/2019 (LMP Unknown)   There is no height or weight on file to calculate BMI.  Physical Exam   {Exam List (Optional):487409}    {Diagnostic Test Results (Optional):270924}    {AMBULATORY ATTESTATION (Optional):060283}

## 2022-03-10 ENCOUNTER — TRANSFERRED RECORDS (OUTPATIENT)
Dept: HEALTH INFORMATION MANAGEMENT | Facility: CLINIC | Age: 42
End: 2022-03-10
Payer: COMMERCIAL

## 2022-04-06 ENCOUNTER — OFFICE VISIT (OUTPATIENT)
Dept: FAMILY MEDICINE | Facility: CLINIC | Age: 42
End: 2022-04-06
Payer: COMMERCIAL

## 2022-04-06 VITALS
OXYGEN SATURATION: 99 % | SYSTOLIC BLOOD PRESSURE: 107 MMHG | BODY MASS INDEX: 44.65 KG/M2 | DIASTOLIC BLOOD PRESSURE: 73 MMHG | HEIGHT: 63 IN | WEIGHT: 252 LBS | HEART RATE: 80 BPM | RESPIRATION RATE: 14 BRPM | TEMPERATURE: 98 F

## 2022-04-06 DIAGNOSIS — E03.8 SUBCLINICAL HYPOTHYROIDISM: ICD-10-CM

## 2022-04-06 DIAGNOSIS — M67.431 GANGLION CYST OF WRIST, RIGHT: ICD-10-CM

## 2022-04-06 DIAGNOSIS — Z01.818 PREOP GENERAL PHYSICAL EXAM: Primary | ICD-10-CM

## 2022-04-06 PROCEDURE — 99214 OFFICE O/P EST MOD 30 MIN: CPT | Performed by: PHYSICIAN ASSISTANT

## 2022-04-06 NOTE — PATIENT INSTRUCTIONS
Preparing for Your Surgery  Getting started  A nurse will call you to review your health history and instructions. They will give you an arrival time based on your scheduled surgery time. Please be ready to share:    Your doctor's clinic name and phone number    Your medical, surgical and anesthesia history    A list of allergies and sensitivities    A list of medicines, including herbal treatments and over-the-counter drugs    Whether the patient has a legal guardian (ask how to send us the papers in advance)  Please tell us if you're pregnant--or if there's any chance you might be pregnant. Some surgeries may injure a fetus (unborn baby), so they require a pregnancy test. Surgeries that are safe for a fetus don't always need a test, and you can choose whether to have one.   If you have a child who's having surgery, please ask for a copy of Preparing for Your Child's Surgery.    Preparing for surgery    Within 30 days of surgery: Have a pre-op exam (sometimes called an H&P, or History and Physical). This can be done at a clinic or pre-operative center.  ? If you're having a , you may not need this exam. Talk to your care team.    At your pre-op exam, talk to your care team about all medicines you take. If you need to stop any medicines before surgery, ask when to start taking them again.  ? We do this for your safety. Many medicines can make you bleed too much during surgery. Some change how well surgery (anesthesia) drugs work.    Call your insurance company to let them know you're having surgery. (If you don't have insurance, call [phone].)    Call your clinic if there's any change in your health. This includes signs of a cold or flu (sore throat, runny nose, cough, rash, fever). It also includes a scrape or scratch near the surgery site.    If you have questions on the day of surgery, call your hospital or surgery center.  COVID testing  You may need to be tested for COVID-19 before having  surgery. If so, your surgical team will give you instructions for scheduling this test, separate from your preoperative history and physical.  Eating and drinking guidelines  For your safety: Unless your surgeon tells you otherwise, follow the guidelines below.    Eat and drink as usual until 8 hours before surgery. After that, no food or milk.    Drink clear liquids until 2 hours before surgery. These are liquids you can see through, like water, Gatorade and Propel Water. You may also have black coffee and tea (no cream or milk).    Nothing by mouth within 2 hours of surgery. This includes gum, candy and breath mints.    If you drink alcohol: Stop drinking it the night before surgery.    If your care team tells you to take medicine on the morning of surgery, it's okay to take it with a sip of water.  Preventing infection    Shower or bathe the night before and morning of your surgery. Follow the instructions your clinic gave you. (If no instructions, use regular soap.)    Don't shave or clip hair near your surgery site. We'll remove the hair if needed.    Don't smoke or vape the morning of surgery. You may chew nicotine gum up to 2 hours before surgery. A nicotine patch is okay.  ? Note: Some surgeries require you to completely quit smoking and nicotine. Check with your surgeon.    Your care team will make every effort to keep you safe from infection. We will:  ? Clean our hands often with soap and water (or an alcohol-based hand rub).  ? Clean the skin at your surgery site with a special soap that kills germs.  ? Give you a special gown to keep you warm. (Cold raises the risk of infection.)  ? Wear special hair covers, masks, gowns and gloves during surgery.  ? Give antibiotic medicine, if prescribed. Not all surgeries need antibiotics.  What to bring on the day of surgery    Photo ID and insurance card    Copy of your health care directive, if you have one    Glasses and hearing aides (bring cases)  ? You can't  wear contacts during surgery    Inhaler and eye drops, if you use them (tell us about these when you arrive)    CPAP machine or breathing device, if you use them    A few personal items, if spending the night    If you have . . .  ? A pacemaker, ICD (cardiac defibrillator) or other implant: Bring the ID card.  ? An implanted stimulator: Bring the remote control.  ? A legal guardian: Bring a copy of the certified (court-stamped) guardianship papers.  Please remove any jewelry, including body piercings. Leave jewelry and other valuables at home.  If you're going home the day of surgery    You must have a responsible adult drive you home. They should stay with you overnight as well.    If you don't have someone to stay with you, and you aren't safe to go home alone, we may keep you overnight. Insurance often won't pay for this.  After surgery  If it's hard to control your pain or you need more pain medicine, please call your surgeon's office.  Questions?   If you have any questions for your care team, list them here: _________________________________________________________________________________________________________________________________________________________________________ ____________________________________ ____________________________________ ____________________________________  For informational purposes only. Not to replace the advice of your health care provider. Copyright   2003, 2019 [facility]. All rights reserved. Clinically reviewed by Breanna Pelletier MD. dELiAs [id number] - REV 07/21.

## 2022-04-06 NOTE — PROGRESS NOTES
[facility]  [address] [facility]  [place] [address]  Phone: [phone]  Primary Provider: Birgit Curtis  Pre-op Performing Provider: HAROON LU      PREOPERATIVE EVALUATION:  Today's date: 4/6/2022    Jennifer Valentin is a 41 year old female who presents for a preoperative evaluation.    Surgical Information:  Surgery/Procedure: Cyst Removal of Right Wirst   Surgery Location: [facility] [place]   Surgeon: Celso  Surgery Date: 04/14/2022  Time of Surgery: TBD   Where patient plans to recover: At home with family  Fax number for surgical facility: [phone]    Type of Anesthesia Anticipated: General    Assessment & Plan     The proposed surgical procedure is considered LOW risk.    Preop general physical exam  Preop covid testing per surgical team.     Ganglion cyst of wrist, right  Requesting surgical removal due to irritation    Subclinical hypothyroidism  Levels in balance.  TSH 1 month ago stable.            Risks and Recommendations:  The patient has the following additional risks and recommendations for perioperative complications:   - No identified additional risk factors other than previously addressed    Medication Instructions:  Patient is to take all scheduled medications on the day of surgery    RECOMMENDATION:  APPROVAL GIVEN to proceed with proposed procedure, without further diagnostic evaluation.                      Subjective     HPI related to upcoming procedure: Cyst of Right Wrist.    Cyst present for a few months.  Painful if she bumps it or overuses wrist.       Preop Questions 4/3/2022   1. Have you ever had a heart attack or stroke? No   2. Have you ever had surgery on your heart or blood vessels, such as a stent placement, a coronary artery bypass, or surgery on an artery in your head, neck, heart, or legs? No   3. Do you have chest pain with activity? No   4. Do you have a history of  heart failure? No   5. Do you currently  have a cold, bronchitis or symptoms of other infection? No   6. Do you have a cough, shortness of breath, or wheezing? No   7. Do you or anyone in your family have previous history of blood clots? No   8. Do you or does anyone in your family have a serious bleeding problem such as prolonged bleeding following surgeries or cuts? No   9. Have you ever had problems with anemia or been told to take iron pills? No   10. Have you had any abnormal blood loss such as black, tarry or bloody stools, or abnormal vaginal bleeding? No   11. Have you ever had a blood transfusion? No   12. Are you willing to have a blood transfusion if it is medically needed before, during, or after your surgery? Yes   13. Have you or any of your relatives ever had problems with anesthesia? No   14. Do you have sleep apnea, excessive snoring or daytime drowsiness? No   15. Do you have any artifical heart valves or other implanted medical devices like a pacemaker, defibrillator, or continuous glucose monitor? No   16. Do you have artificial joints? No   17. Are you allergic to latex? No   18. Is there any chance that you may be pregnant? No       Health Care Directive:  Patient does not have a Health Care Directive or Living Will:     Preoperative Review of :   reviewed - no record of controlled substances prescribed.      Status of Chronic Conditions:  HYPOTHYROIDISM - Patient has a longstanding history of chronic subclinical Hypothyroidism. Patient has been doing well, noting no tremor, insomnia, hair loss or changes in skin texture. Continues to take medications as directed, without adverse reactions or side effects. Last TSH   Lab Results   Component Value Date    TSH 2.82 02/28/2022   .        Review of Systems  Constitutional, neuro, ENT, endocrine, pulmonary, cardiac, gastrointestinal, genitourinary, musculoskeletal, integument and psychiatric systems are negative, except as otherwise noted.    Patient Active Problem List    Diagnosis  Date Noted     Class 3 severe obesity due to excess calories without serious comorbidity with body mass index (BMI) of 45.0 to 49.9 in adult (H) 2019     Priority: Medium     Elevated LFTs 2018     Priority: Medium     Subclinical hypothyroidism 2015     Priority: Medium     Family history of thyroid disease 2015     Priority: Medium     Contraception -- current partner vasectomy 2014     Priority: Medium      Past Medical History:   Diagnosis Date     BACKACHE NOS 10/10/2006     Menarche age 11    cycles q mo x 6 d occasional cramps     Obese      Thyroid disease      Past Surgical History:   Procedure Laterality Date     COLONOSCOPY  2014    Procedure: COLONOSCOPY;  Surgeon: Lyndon Stark MD;  Location: [facility]     CYSTOSCOPY N/A 2019    Procedure: Cystoscopy;  Surgeon: Yash Welsh MD;  Location: [facility]     HYSTERECTOMY, PAP NO LONGER INDICATED       LAPAROSCOPIC CHOLECYSTECTOMY WITH CHOLANGIOGRAMS  2014    Procedure: LAPAROSCOPIC CHOLECYSTECTOMY WITH CHOLANGIOGRAMS;  Surgeon: Julius Butt MD;  Location: [facility]     LAPAROSCOPIC HYSTERECTOMY TOTAL, BILATERAL SALPINGO-OOPHORECTOMY, COMBINED Bilateral 2019    Procedure: HYSTERECTOMY, TOTAL, LAPAROSCOPIC, WITH SALPINGECTOMY & CYSTOSCOPY & EXCISION OF VAGINAL NODULE;  Surgeon: Yash Welsh MD;  Location: [facility]     WRIST SURGERY Right      ZZC  DELIVERY ONLY       Current Outpatient Medications   Medication Sig Dispense Refill     levothyroxine (SYNTHROID/LEVOTHROID) 25 MCG tablet Take 1 tablet (25 mcg) by mouth daily Due for physical for further refills. 90 tablet 3       Allergies   Allergen Reactions     Flagyl [Metronidazole] Hives        Social History     Tobacco Use     Smoking status: Never Smoker     Smokeless tobacco: Never Used   Substance Use Topics     Alcohol use: No       History   Drug Use No         Objective     /73 (BP Location: Left arm, Patient Position: Chair,  "Cuff Size: Adult Large)   Pulse 80   Temp 98  F (36.7  C) (Tympanic)   Resp 14   Ht 1.6 m (5' 3\")   Wt 114.3 kg (252 lb)   LMP 11/20/2019 (LMP Unknown)   SpO2 99%   Breastfeeding No   BMI 44.64 kg/m      Physical Exam    GENERAL APPEARANCE: healthy, alert and no distress     EYES: EOMI, PERRL     HENT: ear canals and TM's normal and nose and mouth without ulcers or lesions     NECK: no adenopathy, no asymmetry, masses, or scars and thyroid normal to palpation     RESP: lungs clear to auscultation - no rales, rhonchi or wheezes     CV: regular rates and rhythm, normal S1 S2, no S3 or S4 and no murmur, click or rub     ABDOMEN:  soft, nontender, no HSM or masses and bowel sounds normal     MS: extremities normal- no gross deformities noted, no evidence of inflammation in joints, FROM in all extremities.     SKIN: no suspicious lesions or rashes     NEURO: Normal strength and tone, sensory exam grossly normal, mentation intact and speech normal     PSYCH: mentation appears normal. and affect normal/bright     LYMPHATICS: No cervical adenopathy    Recent Labs   Lab Test 04/09/21  0751      POTASSIUM 4.5   CR 0.70        Diagnostics:  No labs were ordered during this visit.   No EKG required, no history of coronary heart disease, significant arrhythmia, peripheral arterial disease or other structural heart disease.    Revised Cardiac Risk Index (RCRI):  The patient has the following serious cardiovascular risks for perioperative complications:   - No serious cardiac risks = 0 points     RCRI Interpretation: 0 points: Class I (very low risk - 0.4% complication rate)           Signed Electronically by: Ana Anderson PA-C  Copy of this evaluation report is provided to requesting physician.      "

## 2022-05-07 ENCOUNTER — HEALTH MAINTENANCE LETTER (OUTPATIENT)
Age: 42
End: 2022-05-07

## 2022-10-17 ENCOUNTER — HOSPITAL ENCOUNTER (OUTPATIENT)
Dept: MAMMOGRAPHY | Facility: CLINIC | Age: 42
Discharge: HOME | End: 2022-10-17
Attending: FAMILY MEDICINE | Admitting: FAMILY MEDICINE
Payer: COMMERCIAL

## 2022-10-17 DIAGNOSIS — Z12.31 VISIT FOR SCREENING MAMMOGRAM: ICD-10-CM

## 2022-10-17 PROCEDURE — 77067 SCR MAMMO BI INCL CAD: CPT

## 2022-11-02 ENCOUNTER — OFFICE VISIT (OUTPATIENT)
Dept: FAMILY MEDICINE | Facility: CLINIC | Age: 42
End: 2022-11-02
Payer: COMMERCIAL

## 2022-11-02 VITALS
WEIGHT: 255.4 LBS | DIASTOLIC BLOOD PRESSURE: 78 MMHG | TEMPERATURE: 98.1 F | RESPIRATION RATE: 16 BRPM | OXYGEN SATURATION: 99 % | BODY MASS INDEX: 45.24 KG/M2 | SYSTOLIC BLOOD PRESSURE: 112 MMHG | HEART RATE: 106 BPM

## 2022-11-02 DIAGNOSIS — K64.4 RESIDUAL HEMORRHOIDAL SKIN TAGS: Primary | ICD-10-CM

## 2022-11-02 PROCEDURE — 99213 OFFICE O/P EST LOW 20 MIN: CPT | Performed by: PHYSICIAN ASSISTANT

## 2022-11-02 ASSESSMENT — PAIN SCALES - GENERAL: PAINLEVEL: NO PAIN (0)

## 2022-11-02 NOTE — PROGRESS NOTES
"  Assessment & Plan   Problem List Items Addressed This Visit    None  Visit Diagnoses     Residual hemorrhoidal skin tags    -  Primary         Anal skin tag from previous hemorrhoid. No concurrent infectious process such as abscess, cellulitis, etc. No thrombosed hemorrhoid or fissure seen. Reassured. Follow up as needed.    Complete history and physical exam as below. AF with normal VS aside from mild tachycardia.    DDx and Dx discussed with and explained to the pt to their satisfaction.  All questions were answered at this time. Pt expressed understanding of and agreement with this dx, tx, and plan. No further workup warranted. I have given the patient a list of pertinent indications for re-evaluation. Patient left in no apparent distress.      BMI:   Estimated body mass index is 45.24 kg/m  as calculated from the following:    Height as of 4/6/22: 1.6 m (5' 3\").    Weight as of this encounter: 115.8 kg (255 lb 6.4 oz).     See Patient Instructions    Return for a recheck as needed.    NELI Bourgeois  [facility] Rodriguez is a 42 year old presenting for the following health issues:  Derm Problem      HPI     Cyst or  Lump in the perineal region. Pretty tiny. A couple months now. Has not done anything to it. No pain, drainage, fevers, changes in bowel/bladder, vaginal bleeding/discharge or other symptoms.    Review of Systems   Constitutional, HEENT, cardiovascular, pulmonary, gi and gu systems are negative, except as otherwise noted.      Objective    /78   Pulse 106   Temp 98.1  F (36.7  C) (Tympanic)   Resp 16   Wt 115.8 kg (255 lb 6.4 oz)   LMP 11/20/2019 (LMP Unknown)   SpO2 99%   BMI 45.24 kg/m    Body mass index is 45.24 kg/m .  Physical Exam  Vitals and nursing note reviewed. Exam conducted with a chaperone present (Allie Monroe CMA).   Constitutional:       General: She is not in acute distress.     Appearance: Normal appearance. She is not " diaphoretic.   HENT:      Head: Normocephalic and atraumatic.      Nose: Nose normal.   Eyes:      Conjunctiva/sclera: Conjunctivae normal.   Pulmonary:      Effort: Pulmonary effort is normal. No respiratory distress.   Genitourinary:     Comments: 5mm fleshy papule present at the anterior aspect of the anal verge consistent with residual hemorrhoidal skin tag. No fissures, hemorrhoids or other abnormalities.  Skin:     General: Skin is dry.      Coloration: Skin is not jaundiced or pale.   Neurological:      General: No focal deficit present.      Mental Status: She is alert. Mental status is at baseline.   Psychiatric:         Mood and Affect: Mood normal.         Behavior: Behavior normal.

## 2022-11-02 NOTE — PATIENT INSTRUCTIONS
Pietro Rodriguez,    Thank you for allowing [facility] to manage your care.    Your exam shows a skin tag at the site of a hemorrhoid that was previously present. It is ok to observe this for now. See us again if it worsens/changes.    Drink 8-10 glasses of fluid daily to stay well-hydrated. Eat a high fiber diet to avoid constipation.    If you have any questions or concerns, please feel free to call us at [phone]    Sincerely,    Perico Powell PA-C    Did you know?      You can schedule a video visit for follow-up appointments as well as future appointments for certain conditions.  Please see the below link.     [email or web address]    If you have not already done so,  I encourage you to sign up for Mychart ([email or web address]).  This will allow you to review your results, securely communicate with a provider, and schedule virtual visits as well.

## 2022-11-18 ENCOUNTER — HOSPITAL ENCOUNTER (OUTPATIENT)
Dept: ULTRASOUND IMAGING | Facility: CLINIC | Age: 42
Discharge: HOME | End: 2022-11-18
Attending: FAMILY MEDICINE
Payer: COMMERCIAL

## 2022-11-18 ENCOUNTER — HOSPITAL ENCOUNTER (OUTPATIENT)
Dept: MAMMOGRAPHY | Facility: CLINIC | Age: 42
Discharge: HOME | End: 2022-11-18
Attending: FAMILY MEDICINE
Payer: COMMERCIAL

## 2022-11-18 DIAGNOSIS — R92.8 ABNORMAL MAMMOGRAM: ICD-10-CM

## 2022-11-18 PROCEDURE — 77061 BREAST TOMOSYNTHESIS UNI: CPT | Mod: LT

## 2022-11-18 PROCEDURE — 76642 ULTRASOUND BREAST LIMITED: CPT | Mod: LT

## 2022-11-29 ENCOUNTER — ANCILLARY PROCEDURE (OUTPATIENT)
Dept: MAMMOGRAPHY | Facility: CLINIC | Age: 42
End: 2022-11-29
Attending: FAMILY MEDICINE
Payer: COMMERCIAL

## 2022-11-29 DIAGNOSIS — R92.30 BREAST DENSITY: ICD-10-CM

## 2022-11-29 PROCEDURE — 77066 DX MAMMO INCL CAD BI: CPT | Performed by: RADIOLOGY

## 2022-11-29 PROCEDURE — G0279 TOMOSYNTHESIS, MAMMO: HCPCS | Performed by: RADIOLOGY

## 2022-11-29 RX ORDER — IOPAMIDOL 755 MG/ML
135 INJECTION, SOLUTION INTRAVASCULAR ONCE
Status: COMPLETED | OUTPATIENT
Start: 2022-11-29 | End: 2022-11-29

## 2022-11-29 RX ADMIN — IOPAMIDOL 135 ML: 755 INJECTION, SOLUTION INTRAVASCULAR at 13:18

## 2023-01-19 NOTE — LETTER
[facility]  [address] [address]  Phone: [phone]    April 3, 2017    Jennifer Valentin  [address] [address]              Dear Ms. Valentin,      The results of your recent throat culture were negative. If you have any further questions or concerns please contact the clinic.            Sincerely,      [facility] Provider  
no

## 2023-02-01 DIAGNOSIS — E03.9 ACQUIRED HYPOTHYROIDISM: ICD-10-CM

## 2023-02-01 RX ORDER — LEVOTHYROXINE SODIUM 25 UG/1
25 TABLET ORAL DAILY
Qty: 90 TABLET | Refills: 3 | Status: SHIPPED | OUTPATIENT
Start: 2023-02-01 | End: 2023-03-02

## 2023-02-23 ASSESSMENT — ENCOUNTER SYMPTOMS: BREAST MASS: 0

## 2023-03-01 ASSESSMENT — ENCOUNTER SYMPTOMS: BREAST MASS: 0

## 2023-03-01 NOTE — PATIENT INSTRUCTIONS
You may continue to use over-the-counter Flonase as needed for your left ear discomfort.  It is safe to use continuously.  If you develop any worsening pain, fever, dizziness, lightheadedness, change in hearing please be reevaluated.    Preventive Health Recommendations  Female Ages 40 to 49    Yearly exam:   See your health care provider every year in order to  Review health changes.   Discuss preventive care.    Review your medicines if your doctor prescribed any.    Get a Pap test every three years (unless you have an abnormal result and your provider advises testing more often).    If you get Pap tests with HPV test, you only need to test every 5 years, unless you have an abnormal result. You do not need a Pap test if your uterus was removed (hysterectomy) and you have not had cancer.    You should be tested each year for STDs (sexually transmitted diseases), if you're at risk.   Ask your doctor if you should have a mammogram.    Have a colonoscopy (test for colon cancer) if someone in your family has had colon cancer or polyps before age 50.     Have a cholesterol test every 5 years.     Have a diabetes test (fasting glucose) after age 45. If you are at risk for diabetes, you should have this test every 3 years.    Shots: Get a flu shot each year. Get a tetanus shot every 10 years.     Nutrition:   Eat at least 5 servings of fruits and vegetables each day.  Eat whole-grain bread, whole-wheat pasta and brown rice instead of white grains and rice.  Get adequate Calcium and Vitamin D.      Lifestyle  Exercise at least 150 minutes a week (an average of 30 minutes a day, 5 days a week). This will help you control your weight and prevent disease.  Limit alcohol to one drink per day.  No smoking.   Wear sunscreen to prevent skin cancer.  See your dentist every six months for an exam and cleaning.

## 2023-03-01 NOTE — PROGRESS NOTES
.     SUBJECTIVE:   CC: Jennifer is an 42 year old who presents for preventive health visit.     Patient has been advised of split billing requirements and indicates understanding: Yes     Healthy Habits:     Getting at least 3 servings of Calcium per day:  Yes    Bi-annual eye exam:  Yes    Dental care twice a year:  Yes    Sleep apnea or symptoms of sleep apnea:  None    Diet:  Regular (no restrictions)    Frequency of exercise:  None    Taking medications regularly:  Yes    Medication side effects:  Not applicable    PHQ-2 Total Score: 0    Additional concerns today:  No      *Check left ear    Today's PHQ-2 Score:   PHQ-2 ( 1999 Pf[employer]) 2/23/2023   Q1: Little interest or pleasure in doing things 0   Q2: Feeling down, depressed or hopeless 0   PHQ-2 Score 0   PHQ-2 Total Score (12-17 Years)- Positive if 3 or more points; Administer PHQ-A if positive -   Q1: Little interest or pleasure in doing things Not at all   Q2: Feeling down, depressed or hopeless Not at all   PHQ-2 Score 0     Social History     Tobacco Use     Smoking status: Never     Smokeless tobacco: Never   Substance Use Topics     Alcohol use: No         Alcohol Use 2/23/2023   Prescreen: >3 drinks/day or >7 drinks/week? No   No flowsheet data found.    Reviewed orders with patient.  Reviewed health maintenance and updated orders accordingly - Yes  BP Readings from Last 3 Encounters:   03/02/23 128/80   11/02/22 112/78   04/06/22 107/73    Wt Readings from Last 3 Encounters:   03/02/23 119.7 kg (264 lb)   11/02/22 115.8 kg (255 lb 6.4 oz)   04/06/22 114.3 kg (252 lb)         Breast Cancer Screening:    Breast CA Risk Assessment (FHS-7) 3/15/2021   Do you have a family history of breast, colon, or ovarian cancer? No / Unknown         Mammogram Screening - Offered annual screening and updated Health Maintenance for mutual plan based on risk factor consideration    Pertinent mammograms are reviewed under the imaging tab.    History of abnormal Pap  smear: NO - HYST  PAP / HPV Latest Ref Rng & Units 2/10/2017 9/19/2013 4/1/2010   PAP (Historical) - NIL NIL WARNING! - Addended   HPV16 NEG Negative - -   HPV18 NEG Negative - -   HRHPV NEG Negative - -     Reviewed and updated as needed this visit by clinical staff               Silvia Caceres CMA    Reviewed and updated as needed this visit by Provider                    Here today for physical. Is fasting today.  Just returned from a cruise.    Left ear and into sinus has been hurting for some time. Got better when was on vacation but now has returned. No decreased hearing. Is congested and having nasal drainage. No fevers. Has been using Flonase and that does seem to help the ear and the congestion. No dizziness.     Works in [employer], shampoo    Has been taking levothyroxine routinely.  Has not been out.    Last Pap: Hyst 2019 due to heavy bleeding. Never an abnormal pap.   Last mammogram: 11/22 abnormal. Repeat US follow up in 6 mo. Has planned for May.  No family history of breast cancer.   Last BMD: N/A  Last Colonoscopy: 2014-normal. No family history of colon cancer.   Last eye exam: yearly   Last dental exam: Every 6 mo  Last tetanus vaccine: 5/21  Last influenza vaccine: 9/22  Last shingles vaccine: N/A  Last pneumonia vaccine: N/A  Last COVID vaccine: Has had both doses  Last COVID booster: 9/22  Hep C screen (born [birth year]):  done 2021  HIV screen: was with pregnancies-negative  AAA screen (age 65-78 with smoking hx): N/A  IVD (HTN, Hyperlipid, Smoking): N/A  Lung CA screening (55-80, 30 pk smoking hx): N/A    Review of Systems   Constitutional: Negative for activity change and fatigue.   HENT: Positive for ear pain. Negative for rhinorrhea and sore throat.    Respiratory: Negative for cough, chest tightness, shortness of breath and wheezing.    Cardiovascular: Negative for chest pain and palpitations.   Gastrointestinal: Negative for abdominal distention, abdominal pain, constipation, diarrhea, nausea  "and vomiting.   Endocrine: Negative for cold intolerance, heat intolerance, polydipsia, polyphagia and polyuria.   Breasts:  Negative for tenderness, breast mass and discharge.   Genitourinary: Negative for difficulty urinating, enuresis, frequency, pelvic pain, urgency, vaginal bleeding and vaginal discharge.   Musculoskeletal: Negative for arthralgias.   Skin: Negative for rash.   Neurological: Negative for dizziness, light-headedness, numbness and headaches.   Psychiatric/Behavioral: Negative for dysphoric mood and sleep disturbance. The patient is not nervous/anxious.         OBJECTIVE:   /80   Pulse 77   Temp 97.1  F (36.2  C) (Tympanic)   Resp 13   Ht 1.626 m (5' 4\")   Wt 119.7 kg (264 lb)   LMP 11/20/2019 (LMP Unknown)   SpO2 98%   BMI 45.32 kg/m    Physical Exam  Constitutional:       Appearance: Normal appearance. She is well-developed.   HENT:      Head: Normocephalic and atraumatic.      Right Ear: Tympanic membrane and external ear normal. No middle ear effusion.      Left Ear: Tympanic membrane and external ear normal.  No middle ear effusion.      Nose: No mucosal edema.      Mouth/Throat:      Pharynx: Uvula midline.   Eyes:      Pupils: Pupils are equal, round, and reactive to light.   Neck:      Thyroid: No thyromegaly.      Vascular: No carotid bruit.   Cardiovascular:      Rate and Rhythm: Normal rate and regular rhythm.      Pulses:           Femoral pulses are 2+ on the right side and 2+ on the left side.     Heart sounds: Normal heart sounds.   Pulmonary:      Effort: Pulmonary effort is normal.      Breath sounds: Normal breath sounds.   Abdominal:      General: Bowel sounds are normal.      Palpations: Abdomen is soft.      Tenderness: There is no abdominal tenderness.   Musculoskeletal:         General: Normal range of motion.      Cervical back: Normal range of motion.   Skin:     General: Skin is warm and dry.      Findings: No rash.   Neurological:      Mental Status: She is " "alert.   Psychiatric:         Behavior: Behavior normal.         ASSESSMENT/PLAN:     Routine general medical examination at a health care facility  Screening guidelines reviewed.   - Glucose; Future  - Hemoglobin A1c; Future  - Lipid panel reflex to direct LDL Fasting; Future    Class 3 severe obesity due to excess calories without serious comorbidity with body mass index (BMI) of 45.0 to 49.9 in adult (H)  Encouraged to increase activity and decrease portion sizes.  - Glucose; Future  - Hemoglobin A1c; Future    Acquired hypothyroidism  We will check labs here today.  Refill sent.  Plan to follow-up in 1 year.  - TSH WITH FREE T4 REFLEX; Future  - levothyroxine (SYNTHROID/LEVOTHROID) 25 MCG tablet; Take 1 tablet (25 mcg) by mouth daily Due for physical for further refills.    Dysfunction of left eustachian tube  Exam unremarkable.  May use over-the-counter Flonase as needed.  May use over-the-counter Sudafed prior to flying.    Patient has been advised of split billing requirements and indicates understanding: Yes      COUNSELING:  Reviewed preventive health counseling, as reflected in patient instructions       Regular exercise       Healthy diet/nutrition       Vision screening       Immunizations    Vaccinated for: Hepatitis B             Colorectal Cancer Screening       Consider Hep C screening for all patients one time for ages 18-79 years       HIV screeninx in teen years, 1x in adult years, and at intervals if high risk      BMI:   Estimated body mass index is 45.32 kg/m  as calculated from the following:    Height as of this encounter: 1.626 m (5' 4\").    Weight as of this encounter: 119.7 kg (264 lb).   Weight management plan: Discussed healthy diet and exercise guidelines      She reports that she has never smoked. She has never used smokeless tobacco.    NEO Stone CNP  [facility]  "

## 2023-03-02 ENCOUNTER — OFFICE VISIT (OUTPATIENT)
Dept: FAMILY MEDICINE | Facility: CLINIC | Age: 43
End: 2023-03-02
Payer: COMMERCIAL

## 2023-03-02 VITALS
HEART RATE: 77 BPM | RESPIRATION RATE: 13 BRPM | OXYGEN SATURATION: 98 % | BODY MASS INDEX: 45.07 KG/M2 | DIASTOLIC BLOOD PRESSURE: 80 MMHG | SYSTOLIC BLOOD PRESSURE: 128 MMHG | WEIGHT: 264 LBS | TEMPERATURE: 97.1 F | HEIGHT: 64 IN

## 2023-03-02 DIAGNOSIS — H69.92 DYSFUNCTION OF LEFT EUSTACHIAN TUBE: ICD-10-CM

## 2023-03-02 DIAGNOSIS — E03.9 ACQUIRED HYPOTHYROIDISM: ICD-10-CM

## 2023-03-02 DIAGNOSIS — Z00.00 ROUTINE GENERAL MEDICAL EXAMINATION AT A HEALTH CARE FACILITY: Primary | ICD-10-CM

## 2023-03-02 DIAGNOSIS — Z23 NEED FOR HEPATITIS VACCINATION: ICD-10-CM

## 2023-03-02 DIAGNOSIS — E66.813 CLASS 3 SEVERE OBESITY DUE TO EXCESS CALORIES WITHOUT SERIOUS COMORBIDITY WITH BODY MASS INDEX (BMI) OF 45.0 TO 49.9 IN ADULT (H): ICD-10-CM

## 2023-03-02 DIAGNOSIS — E66.01 CLASS 3 SEVERE OBESITY DUE TO EXCESS CALORIES WITHOUT SERIOUS COMORBIDITY WITH BODY MASS INDEX (BMI) OF 45.0 TO 49.9 IN ADULT (H): ICD-10-CM

## 2023-03-02 LAB
CHOLEST SERPL-MCNC: 206 MG/DL
FASTING STATUS PATIENT QL REPORTED: YES
FASTING STATUS PATIENT QL REPORTED: YES
GLUCOSE BLD-MCNC: 98 MG/DL (ref 70–99)
HBA1C MFR BLD: 5.1 % (ref 0–5.6)
HDLC SERPL-MCNC: 47 MG/DL
LDLC SERPL CALC-MCNC: 137 MG/DL
NONHDLC SERPL-MCNC: 159 MG/DL
TRIGL SERPL-MCNC: 108 MG/DL
TSH SERPL DL<=0.005 MIU/L-ACNC: 3.16 MU/L (ref 0.4–4)

## 2023-03-02 PROCEDURE — 80061 LIPID PANEL: CPT | Performed by: NURSE PRACTITIONER

## 2023-03-02 PROCEDURE — 36415 COLL VENOUS BLD VENIPUNCTURE: CPT | Performed by: NURSE PRACTITIONER

## 2023-03-02 PROCEDURE — 90471 IMMUNIZATION ADMIN: CPT | Performed by: NURSE PRACTITIONER

## 2023-03-02 PROCEDURE — 83036 HEMOGLOBIN GLYCOSYLATED A1C: CPT | Performed by: NURSE PRACTITIONER

## 2023-03-02 PROCEDURE — 99396 PREV VISIT EST AGE 40-64: CPT | Mod: 25 | Performed by: NURSE PRACTITIONER

## 2023-03-02 PROCEDURE — 84443 ASSAY THYROID STIM HORMONE: CPT | Performed by: NURSE PRACTITIONER

## 2023-03-02 PROCEDURE — 90746 HEPB VACCINE 3 DOSE ADULT IM: CPT | Performed by: NURSE PRACTITIONER

## 2023-03-02 PROCEDURE — 99213 OFFICE O/P EST LOW 20 MIN: CPT | Mod: 25 | Performed by: NURSE PRACTITIONER

## 2023-03-02 PROCEDURE — 82947 ASSAY GLUCOSE BLOOD QUANT: CPT | Performed by: NURSE PRACTITIONER

## 2023-03-02 RX ORDER — LEVOTHYROXINE SODIUM 25 UG/1
25 TABLET ORAL DAILY
Qty: 90 TABLET | Refills: 3 | Status: SHIPPED | OUTPATIENT
Start: 2023-03-02 | End: 2024-03-05

## 2023-03-02 ASSESSMENT — ENCOUNTER SYMPTOMS
NUMBNESS: 0
SORE THROAT: 0
ARTHRALGIAS: 0
POLYDIPSIA: 0
RHINORRHEA: 0
NAUSEA: 0
DIZZINESS: 0
CHEST TIGHTNESS: 0
COUGH: 0
FREQUENCY: 0
CONSTIPATION: 0
LIGHT-HEADEDNESS: 0
DIARRHEA: 0
FATIGUE: 0
DYSPHORIC MOOD: 0
NERVOUS/ANXIOUS: 0
WHEEZING: 0
POLYPHAGIA: 0
ABDOMINAL PAIN: 0
VOMITING: 0
ACTIVITY CHANGE: 0
ABDOMINAL DISTENTION: 0
SHORTNESS OF BREATH: 0
SLEEP DISTURBANCE: 0
HEADACHES: 0
PALPITATIONS: 0
DIFFICULTY URINATING: 0

## 2023-03-02 ASSESSMENT — PAIN SCALES - GENERAL: PAINLEVEL: NO PAIN (0)

## 2023-03-30 ENCOUNTER — ALLIED HEALTH/NURSE VISIT (OUTPATIENT)
Dept: FAMILY MEDICINE | Facility: CLINIC | Age: 43
End: 2023-03-30
Payer: COMMERCIAL

## 2023-03-30 DIAGNOSIS — Z71.89 ADVANCED CARE PLANNING/COUNSELING DISCUSSION: Primary | ICD-10-CM

## 2023-03-30 PROCEDURE — 90471 IMMUNIZATION ADMIN: CPT

## 2023-03-30 PROCEDURE — 99207 PR NO CHARGE NURSE ONLY: CPT

## 2023-03-30 PROCEDURE — 90746 HEPB VACCINE 3 DOSE ADULT IM: CPT

## 2023-03-30 NOTE — PROGRESS NOTES
Prior to immunization administration, verified patients identity using patient s name and date of birth. Please see Immunization Activity for additional information.     Screening Questionnaire for Adult Immunization    Are you sick today?   No   Do you have allergies to medications, food, a vaccine component or latex?   No   Have you ever had a serious reaction after receiving a vaccination?   No   Do you have a long-term health problem with heart, lung, kidney, or metabolic disease (e.g., diabetes), asthma, a blood disorder, no spleen, complement component deficiency, a cochlear implant, or a spinal fluid leak?  Are you on long-term aspirin therapy?   No   Do you have cancer, leukemia, HIV/AIDS, or any other immune system problem?   No   Do you have a parent, brother, or sister with an immune system problem?   No   In the past 3 months, have you taken medications that affect  your immune system, such as prednisone, other steroids, or anticancer drugs; drugs for the treatment of rheumatoid arthritis, Crohn s disease, or psoriasis; or have you had radiation treatments?   No   Have you had a seizure, or a brain or other nervous system problem?   No   During the past year, have you received a transfusion of blood or blood    products, or been given immune (gamma) globulin or antiviral drug?   No   For women: Are you pregnant or is there a chance you could become       pregnant during the next month?   No   Have you received any vaccinations in the past 4 weeks?   No     Immunization questionnaire answers were all negative.    I have reviewed the following standing orders:   This patient is due and qualifies for the Hepatitis B vaccine.    Click here for Hepatitis B Standing Order    I have reviewed the vaccines inclusion and exclusion criteria; No concerns regarding eligibility.         Injection of Hep B Aduldt given by Leida Paloamres MA. Patient instructed to remain in clinic for 15 minutes afterwards, and to  report any adverse reactions.     Screening performed by Leida Palomares MA on 3/30/2023 at 11:00 AM.

## 2023-05-30 ENCOUNTER — ANCILLARY PROCEDURE (OUTPATIENT)
Dept: MAMMOGRAPHY | Facility: CLINIC | Age: 43
End: 2023-05-30
Attending: FAMILY MEDICINE
Payer: COMMERCIAL

## 2023-05-30 DIAGNOSIS — N60.12 FIBROCYSTIC DISEASE OF LEFT BREAST: ICD-10-CM

## 2023-05-30 DIAGNOSIS — Z12.31 ENCOUNTER FOR SCREENING MAMMOGRAM FOR BREAST CANCER: Primary | ICD-10-CM

## 2023-05-30 DIAGNOSIS — R92.30 BREAST DENSITY: ICD-10-CM

## 2023-05-30 PROCEDURE — G0279 TOMOSYNTHESIS, MAMMO: HCPCS | Performed by: RADIOLOGY

## 2023-05-30 PROCEDURE — 77065 DX MAMMO INCL CAD UNI: CPT | Mod: LT | Performed by: RADIOLOGY

## 2023-05-31 ENCOUNTER — ANCILLARY ORDERS (OUTPATIENT)
Dept: FAMILY MEDICINE | Facility: CLINIC | Age: 43
End: 2023-05-31

## 2023-05-31 DIAGNOSIS — N60.12 FIBROCYSTIC DISEASE OF LEFT BREAST: ICD-10-CM

## 2023-05-31 DIAGNOSIS — R92.8 BI-RADS CATEGORY 3 MAMMOGRAM RESULT: ICD-10-CM

## 2023-09-05 ENCOUNTER — ALLIED HEALTH/NURSE VISIT (OUTPATIENT)
Dept: FAMILY MEDICINE | Facility: CLINIC | Age: 43
End: 2023-09-05
Payer: COMMERCIAL

## 2023-09-05 DIAGNOSIS — Z23 ENCOUNTER FOR IMMUNIZATION: Primary | ICD-10-CM

## 2023-09-05 PROCEDURE — 90746 HEPB VACCINE 3 DOSE ADULT IM: CPT

## 2023-09-05 PROCEDURE — 99207 PR NO CHARGE NURSE ONLY: CPT

## 2023-09-05 PROCEDURE — 90471 IMMUNIZATION ADMIN: CPT

## 2023-09-05 NOTE — PROGRESS NOTES
Prior to immunization administration, verified patients identity using patient s name and date of birth. Please see Immunization Activity for additional information.     Screening Questionnaire for Adult Immunization    Are you sick today?   No   Do you have allergies to medications, food, a vaccine component or latex?   No   Have you ever had a serious reaction after receiving a vaccination?   No   Do you have a long-term health problem with heart, lung, kidney, or metabolic disease (e.g., diabetes), asthma, a blood disorder, no spleen, complement component deficiency, a cochlear implant, or a spinal fluid leak?  Are you on long-term aspirin therapy?   No   Do you have cancer, leukemia, HIV/AIDS, or any other immune system problem?   No   Do you have a parent, brother, or sister with an immune system problem?   No   In the past 3 months, have you taken medications that affect  your immune system, such as prednisone, other steroids, or anticancer drugs; drugs for the treatment of rheumatoid arthritis, Crohn s disease, or psoriasis; or have you had radiation treatments?   No   Have you had a seizure, or a brain or other nervous system problem?   No   During the past year, have you received a transfusion of blood or blood    products, or been given immune (gamma) globulin or antiviral drug?   No   For women: Are you pregnant or is there a chance you could become       pregnant during the next month?   No   Have you received any vaccinations in the past 4 weeks?   No     Immunization questionnaire answers were all negative.    I have reviewed the following standing orders:   This patient is due and qualifies for the Hepatitis B vaccine.    Click here for Hepatitis B Standing Order    I have reviewed the vaccines inclusion and exclusion criteria; No concerns regarding eligibility.     Patient instructed to remain in clinic for 15 minutes afterwards, and to report any adverse reactions.     Screening performed by Reina  PAULO Rudd on 9/5/2023 at 11:24 AM.

## 2023-11-20 ENCOUNTER — ANCILLARY PROCEDURE (OUTPATIENT)
Dept: MAMMOGRAPHY | Facility: CLINIC | Age: 43
End: 2023-11-20
Attending: NURSE PRACTITIONER
Payer: COMMERCIAL

## 2023-11-20 DIAGNOSIS — N60.12 FIBROCYSTIC DISEASE OF LEFT BREAST: ICD-10-CM

## 2023-11-20 DIAGNOSIS — R92.8 BI-RADS CATEGORY 3 MAMMOGRAM RESULT: ICD-10-CM

## 2023-11-20 PROCEDURE — G0279 TOMOSYNTHESIS, MAMMO: HCPCS

## 2023-11-20 PROCEDURE — 77066 DX MAMMO INCL CAD BI: CPT

## 2023-11-20 NOTE — LETTER
Jennifer Valentin  [address] [facility] [address]              November 20, 2023  Date of Exam:     Dear Jennifer:    Thank you for your recent visit.  Breast Imaging Result: We are pleased to inform you that the results of your recent breast imaging show no evidence of malignancy (cancer).    If you are experiencing any breast problems such as a lump or localized pain we request that you discuss this with your health care team if you haven t already done so, as additional testing may be necessary.    As you know, early detection of cancer is very important. Although not all cancers are found through breast imaging, it is the most accurate method for early detection. A thorough examination includes a combination of breast imaging, physical examination and breast self-examination. Currently the American College of Radiology and the Society of Breast Imaging recommend breast imaging beginning at the age of 40.    A report of your breast imaging results was sent to: Julissa Garza    Your breast imaging will become part of your medical file here at [facility] for at least 10 years. You are responsible for informing any new health care team or breast imaging facility of the date and location of this examination.    We appreciate the opportunity to participate in your health care.    Sincerely,  Dr. Karon DIEHL [facility]

## 2024-03-28 SDOH — HEALTH STABILITY: PHYSICAL HEALTH: ON AVERAGE, HOW MANY MINUTES DO YOU ENGAGE IN EXERCISE AT THIS LEVEL?: 0 MIN

## 2024-03-28 SDOH — HEALTH STABILITY: PHYSICAL HEALTH: ON AVERAGE, HOW MANY DAYS PER WEEK DO YOU ENGAGE IN MODERATE TO STRENUOUS EXERCISE (LIKE A BRISK WALK)?: 0 DAYS

## 2024-03-28 ASSESSMENT — SOCIAL DETERMINANTS OF HEALTH (SDOH): HOW OFTEN DO YOU GET TOGETHER WITH FRIENDS OR RELATIVES?: ONCE A WEEK

## 2024-03-28 NOTE — COMMUNITY RESOURCES LIST (ENGLISH)
March 28, 2024           YOUR PERSONALIZED LIST OF SERVICES & PROGRAMS           & RECREATION    [place]      [place] - Sports clubs and recreational activities - [facility]  [address] [place] [address] (Distance: 6.7 miles)  Language: English  Fee: Self pay, Sliding scale      of [place] - Sports clubs and recreational activities  [address] [place] [address] (Distance: 2.2 miles)  Phone: [phone]  Website: [email or web address]  Language: English  Fee: Self pay      [facility] - Adult Enrichment  Phone: [phone]  Website: [email or web address]  Language: English  Hours: Mon 7:30 AM - 4:00 PM Tue 7:30 AM - 4:00 PM Wed 7:30 AM - 4:00 PM Thu 7:30 AM - 4:00 PM Fri 7:30 AM - 4:00 PM    Classes/Groups      of [facility] - Group fitness classes - [facility] at [address] [place] [address] (Distance: 5.3 miles)  Language: English  Fee: Free, Self pay, Sliding scale      [place] - Yoga or Pilates classes - [facility] at [facility]  [address] [place] [address] (Distance: 5.3 miles)  Language: English  Fee: Free, Self pay, Sliding scale      [facility] - Adult Enrichment  Phone: [phone]  Website: [email or web address]  Language: English  Hours: Mon 7:30 AM - 4:00 PM Tue 7:30 AM - 4:00 PM Wed 7:30 AM - 4:00 PM Thu 7:30 AM - 4:00 PM Fri 7:30 AM - 4:00 PM               IMPORTANT NUMBERS & WEBSITES        Emergency Services  911  .   [facility]  211 http://211unitedway.org  .   Poison Control  (480) 387-5790 http://[place]poison.org http://wisconsinpoison.org  .     Suicide and Crisis Lifeline  988 http://988lifeline.org  .   Childhelp [place] Child Abuse Hotline  849.735.8602 http://Childhelphotline.org   .   National Sexual Assault  Hotline  (346) 750-2856 (HOPE) http://CompBluen.[employer] Limited   .     National Runaway Safeline  (513) 453-6624 (RUNAWAY) http://i.TV.[employer] Limited  .   Pregnancy & Postpartum Support  Call/text 546-755-3641  [place]: http://ppsupportmn.org  [place]: http://psichapters.com/wi  .   Substance Abuse National Helpline ([facility])  120-409-HELP (9885) http://Findtreatment.gov   .                DISCLAIMER: [employer] does not endorse any service providers mentioned in this resource list. [employer] does not guarantee that the services mentioned in this resource list will be available to you or will improve your health or wellness.    [facility]

## 2024-04-04 ENCOUNTER — OFFICE VISIT (OUTPATIENT)
Dept: FAMILY MEDICINE | Facility: CLINIC | Age: 44
End: 2024-04-04
Payer: COMMERCIAL

## 2024-04-04 VITALS
TEMPERATURE: 97.5 F | SYSTOLIC BLOOD PRESSURE: 128 MMHG | OXYGEN SATURATION: 98 % | HEART RATE: 98 BPM | RESPIRATION RATE: 16 BRPM | WEIGHT: 269 LBS | HEIGHT: 65 IN | BODY MASS INDEX: 44.82 KG/M2 | DIASTOLIC BLOOD PRESSURE: 80 MMHG

## 2024-04-04 DIAGNOSIS — E03.8 SUBCLINICAL HYPOTHYROIDISM: ICD-10-CM

## 2024-04-04 DIAGNOSIS — E78.5 HYPERLIPIDEMIA LDL GOAL <100: ICD-10-CM

## 2024-04-04 DIAGNOSIS — K21.00 GASTROESOPHAGEAL REFLUX DISEASE WITH ESOPHAGITIS WITHOUT HEMORRHAGE: ICD-10-CM

## 2024-04-04 DIAGNOSIS — Z00.00 ROUTINE GENERAL MEDICAL EXAMINATION AT A HEALTH CARE FACILITY: Primary | ICD-10-CM

## 2024-04-04 LAB
CHOLEST SERPL-MCNC: 201 MG/DL
FASTING STATUS PATIENT QL REPORTED: YES
FASTING STATUS PATIENT QL REPORTED: YES
GLUCOSE SERPL-MCNC: 97 MG/DL (ref 70–99)
HDLC SERPL-MCNC: 44 MG/DL
LDLC SERPL CALC-MCNC: 135 MG/DL
NONHDLC SERPL-MCNC: 157 MG/DL
TRIGL SERPL-MCNC: 112 MG/DL
TSH SERPL DL<=0.005 MIU/L-ACNC: 3.25 UIU/ML (ref 0.3–4.2)

## 2024-04-04 PROCEDURE — 99214 OFFICE O/P EST MOD 30 MIN: CPT | Mod: 25 | Performed by: NURSE PRACTITIONER

## 2024-04-04 PROCEDURE — 82947 ASSAY GLUCOSE BLOOD QUANT: CPT | Performed by: NURSE PRACTITIONER

## 2024-04-04 PROCEDURE — 99396 PREV VISIT EST AGE 40-64: CPT | Performed by: NURSE PRACTITIONER

## 2024-04-04 PROCEDURE — 84443 ASSAY THYROID STIM HORMONE: CPT | Performed by: NURSE PRACTITIONER

## 2024-04-04 PROCEDURE — 80061 LIPID PANEL: CPT | Performed by: NURSE PRACTITIONER

## 2024-04-04 PROCEDURE — 36415 COLL VENOUS BLD VENIPUNCTURE: CPT | Performed by: NURSE PRACTITIONER

## 2024-04-04 RX ORDER — LEVOTHYROXINE SODIUM 25 UG/1
25 TABLET ORAL DAILY
Qty: 90 TABLET | Refills: 3 | Status: SHIPPED | OUTPATIENT
Start: 2024-04-04

## 2024-04-04 NOTE — PATIENT INSTRUCTIONS
Preventive Care Advice   This is general advice given by our system to help you stay healthy. However, your care team may have specific advice just for you. Please talk to your care team about your preventive care needs.  Nutrition  Eat 5 or more servings of fruits and vegetables each day.  Try wheat bread, brown rice and whole grain pasta (instead of white bread, rice, and pasta).  Get enough calcium and vitamin D. Check the label on foods and aim for 100% of the RDA (recommended daily allowance).  Lifestyle  Exercise at least 150 minutes each week   (30 minutes a day, 5 days a week).  Do muscle strengthening activities 2 days a week. These help control your weight and prevent disease.  No smoking.  Wear sunscreen to prevent skin cancer.  Have a dental exam and cleaning every 6 months.  Yearly exams  See your health care team every year to talk about:  Any changes in your health.  Any medicines your care team has prescribed.  Preventive care, family planning, and ways to prevent chronic diseases.  Shots (vaccines)   HPV shots (up to age 26), if you've never had them before.  Hepatitis B shots (up to age 59), if you've never had them before.  COVID-19 shot: Get this shot when it's due.  Flu shot: Get a flu shot every year.  Tetanus shot: Get a tetanus shot every 10 years.  Pneumococcal, hepatitis A, and RSV shots: Ask your care team if you need these based on your risk.  Shingles shot (for age 50 and up).  General health tests  Diabetes screening:  Starting at age 35, Get screened for diabetes at least every 3 years.  If you are younger than age 35, ask your care team if you should be screened for diabetes.  Cholesterol test: At age 39, start having a cholesterol test every 5 years, or more often if advised.  Bone density scan (DEXA): At age 50, ask your care team if you should have this scan for osteoporosis (brittle bones).  Hepatitis C: Get tested at least once in your life.  STIs (sexually transmitted  infections)  Before age 24: Ask your care team if you should be screened for STIs.  After age 24: Get screened for STIs if you're at risk. You are at risk for STIs (including HIV) if:  You are sexually active with more than one person.  You don't use condoms every time.  You or a partner was diagnosed with a sexually transmitted infection.  If you are at risk for HIV, ask about PrEP medicine to prevent HIV.  Get tested for HIV at least once in your life, whether you are at risk for HIV or not.  Cancer screening tests  Cervical cancer screening: If you have a cervix, begin getting regular cervical cancer screening tests at age 21. Most people who have regular screenings with normal results can stop after age 65. Talk about this with your provider.  Breast cancer scan (mammogram): If you've ever had breasts, begin having regular mammograms starting at age 40. This is a scan to check for breast cancer.  Colon cancer screening: It is important to start screening for colon cancer at age 45.  Have a colonoscopy test every 10 years (or more often if you're at risk) Or, ask your provider about stool tests like a FIT test every year or Cologuard test every 3 years.  To learn more about your testing options, visit: https://www.Appstarter/085103.pdf.  For help making a decision, visit: https://bit.ly/rt30297.  Prostate cancer screening test: If you have a prostate and are age 55 to 69, ask your provider if you would benefit from a yearly prostate cancer screening test.  Lung cancer screening: If you are a current or former smoker age 50 to 80, ask your care team if ongoing lung cancer screenings are right for you.  For informational purposes only. Not to replace the advice of your health care provider. Copyright   2023 [employer]. All rights reserved. Clinically reviewed by the [facility] Transitions Program. [employer] [id number] - REV 01/24.

## 2024-04-04 NOTE — PROGRESS NOTES
"Preventive Care Visit  [facility] NEO Bauman CNP, Family Medicine  Apr 4, 2024      Assessment & Plan     Subclinical hypothyroidism  Been taking levothyroxine consistently.  Will recheck labs here today.  Refill given.  Follow-up in 1 year  - TSH WITH FREE T4 REFLEX; Future  - levothyroxine (SYNTHROID/LEVOTHROID) 25 MCG tablet; Take 1 tablet (25 mcg) by mouth daily    Routine general medical examination at a health care facility  Screening guidelines reviewed.   - Glucose; Future  - Lipid panel reflex to direct LDL Fasting; Future    Gastroesophageal reflux disease with esophagitis without hemorrhage  Continues with uncontrolled symptoms with omeprazole daily.  Will arrange for upper endoscopy.  - Adult GI  Referral - Procedure Only; Future  - omeprazole (PRILOSEC) 20 MG DR capsule; Take 1 capsule (20 mg) by mouth daily    Hyperlipidemia LDL goal <100  Previous lipids reviewed.  Will recheck here today.        BMI  Estimated body mass index is 45.46 kg/m  as calculated from the following:    Height as of this encounter: 1.638 m (5' 4.5\").    Weight as of this encounter: 122 kg (269 lb).   Weight management plan: Discussed healthy diet and exercise guidelines    Counseling  Appropriate preventive services were discussed with this patient, including applicable screening as appropriate for fall prevention, nutrition, physical activity, Tobacco-use cessation, weight loss and cognition.  Checklist reviewing preventive services available has been given to the patient.  Reviewed patient's diet, addressing concerns and/or questions.       Nancy Rodriguez is a 43 year old, presenting for the following:  Physical        4/4/2024     7:10 AM   Additional Questions   Roomed by Zulema LAST CMA        Health Care Directive  Patient does not have a Health Care Directive or Living Will: Discussed advance care planning with patient; information given to patient to review.    HPI     "  3/28/2024   General Health   How would you rate your overall physical health? Good   Feel stress (tense, anxious, or unable to sleep) Not at all         3/28/2024   Nutrition   Three or more servings of calcium each day? Yes   Diet: Regular (no restrictions)   How many servings of fruit and vegetables per day? (!) 2-3   How many sweetened beverages each day? 0-1         3/28/2024   Exercise   Days per week of moderate/strenous exercise 0 days   Average minutes spent exercising at this level 0 min   (!) EXERCISE CONCERN      3/28/2024   Social Factors   Frequency of gathering with friends or relatives Once a week   Worry food won't last until get money to buy more No   Food not last or not have enough money for food? No   Do you have housing?  Yes   Are you worried about losing your housing? No   Lack of transportation? No   Unable to get utilities (heat,electricity)? No         3/28/2024   Dental   Dentist two times every year? Yes         3/28/2024   TB Screening   Were you born outside of the [place]? No         Today's PHQ-2 Score:       4/3/2024    10:40 AM   PHQ-2 ( 1999 Pfizer)   Q1: Little interest or pleasure in doing things 0   Q2: Feeling down, depressed or hopeless 0   PHQ-2 Score 0   Q1: Little interest or pleasure in doing things Not at all   Q2: Feeling down, depressed or hopeless Not at all   PHQ-2 Score 0           3/28/2024   Substance Use   Alcohol more than 3/day or more than 7/wk No   Do you use any other substances recreationally? No     Social History     Tobacco Use    Smoking status: Never     Passive exposure: Never    Smokeless tobacco: Never   Vaping Use    Vaping Use: Never used   Substance Use Topics    Alcohol use: No    Drug use: No             3/28/2024   Breast Cancer Screening   Family history of breast, colon, or ovarian cancer? No / Unknown         11/20/2023   LAST FHS-7 RESULTS   1st degree relative breast or ovarian cancer No   Any relative bilateral breast cancer No   Any male  have breast cancer No   Any ONE woman have BOTH breast AND ovarian cancer No   Any woman with breast cancer before 50yrs No   2 or more relatives with breast AND/OR ovarian cancer No   2 or more relatives with breast AND/OR bowel cancer No              3/28/2024   STI Screening   New sexual partner(s) since last STI/HIV test? No     History of abnormal Pap smear:         Latest Ref Rng & Units 2/10/2017     3:01 PM 2/10/2017     2:20 PM 9/19/2013    12:00 AM   PAP / HPV   PAP (Historical)   NIL  NIL    HPV 16 DNA NEG Negative      HPV 18 DNA NEG Negative      Other HR HPV NEG Negative        ASCVD Risk   The ASCVD Risk score (Alex DENISE, et al., 2019) failed to calculate for the following reasons:    The systolic blood pressure is missing       Reviewed and updated as needed this visit by Provider                      Here today for phyusical. Is fasting for labs.     Is still having trouble with stomach. Taking priolsec daily and does help some. If does not take it will have nausea and stabbing pain to left upper side. When takes it will occasionally get nausea. No NSAIDS. No alcohol use. No smoking. When wakes in the morning will have froggy throat and takes about 1 hour in the AM to get it to clear.     Has been taking levothyroixine dialy. Has not been out.     Last Pap: Hyst 2019 due to heavy bleeding. Never an abnormal pap.   Last mammogram: 11/23-normal.   No family history of breast cancer.   Last BMD: N/A  Last Colonoscopy: 2014-normal. No family history of colon cancer.   Last eye exam: yearly   Last dental exam: Every 6 mo  Last tetanus vaccine: 5/21  Last influenza vaccine: 9/23  Last shingles vaccine: N/A  Last pneumonia vaccine: N/A  Last COVID vaccine: Has had both doses  Last COVID booster: 9/23  Hep C screen (born [birth year]):  done 2021  HIV screen: 2020  AAA screen (age 65-78 with smoking hx): N/A  IVD (HTN, Hyperlipid, Smoking): N/A  Lung CA screening (55-80, 30 pk smoking hx): N/A      "  Objective    Exam  LMP 11/20/2019 (LMP Unknown)    Estimated body mass index is 45.32 kg/m  as calculated from the following:    Height as of 3/2/23: 1.626 m (5' 4\").    Weight as of 3/2/23: 119.7 kg (264 lb).    Physical Exam  Constitutional:       Appearance: Normal appearance. She is well-developed.   HENT:      Head: Normocephalic and atraumatic.      Right Ear: Tympanic membrane and external ear normal. No middle ear effusion.      Left Ear: Tympanic membrane and external ear normal.  No middle ear effusion.      Nose: No mucosal edema.   Neck:      Thyroid: No thyromegaly.      Vascular: No carotid bruit.   Cardiovascular:      Rate and Rhythm: Normal rate and regular rhythm.      Heart sounds: Normal heart sounds.   Pulmonary:      Effort: Pulmonary effort is normal.      Breath sounds: Normal breath sounds.   Abdominal:      General: Bowel sounds are normal.      Palpations: Abdomen is soft.   Skin:     General: Skin is warm and dry.   Neurological:      Mental Status: She is alert.   Psychiatric:         Behavior: Behavior normal.             Signed Electronically by: NEO Stone CNP    "

## 2024-04-08 ENCOUNTER — ANESTHESIA EVENT (OUTPATIENT)
Dept: GASTROENTEROLOGY | Facility: CLINIC | Age: 44
End: 2024-04-08
Payer: COMMERCIAL

## 2024-04-08 NOTE — ANESTHESIA PREPROCEDURE EVALUATION
Anesthesia Pre-Procedure Evaluation    Patient: Jennifer Valentin   MRN: [id number] : 1980        Procedure : Procedure(s):  Esophagoscopy, gastroscopy, duodenoscopy (EGD), combined          Past Medical History:   Diagnosis Date    BACKACHE NOS 10/10/2006    Menarche age 11    cycles q mo x 6 d occasional cramps    Obese     Thyroid disease       Past Surgical History:   Procedure Laterality Date    COLONOSCOPY  2014    Procedure: COLONOSCOPY;  Surgeon: Lyndon Stark MD;  Location: [facility]    CYSTOSCOPY N/A 2019    Procedure: Cystoscopy;  Surgeon: Yash Welsh MD;  Location: [facility]    HYSTERECTOMY, PAP NO LONGER INDICATED      LAPAROSCOPIC CHOLECYSTECTOMY WITH CHOLANGIOGRAMS  2014    Procedure: LAPAROSCOPIC CHOLECYSTECTOMY WITH CHOLANGIOGRAMS;  Surgeon: Julius Butt MD;  Location: [facility]    LAPAROSCOPIC HYSTERECTOMY TOTAL, BILATERAL SALPINGO-OOPHORECTOMY, COMBINED Bilateral 2019    Procedure: HYSTERECTOMY, TOTAL, LAPAROSCOPIC, WITH SALPINGECTOMY & CYSTOSCOPY & EXCISION OF VAGINAL NODULE;  Surgeon: Yash Welsh MD;  Location: [facility]    WRIST SURGERY Right     ZZC  DELIVERY ONLY        Allergies   Allergen Reactions    Metronidazole Hives      Social History     Tobacco Use    Smoking status: Never     Passive exposure: Never    Smokeless tobacco: Never   Substance Use Topics    Alcohol use: No      Wt Readings from Last 1 Encounters:   24 122 kg (269 lb)        Anesthesia Evaluation   Pt has had prior anesthetic. Type: General and MAC.        ROS/MED HX  ENT/Pulmonary:       Neurologic:       Cardiovascular:       METS/Exercise Tolerance:     Hematologic:       Musculoskeletal:       GI/Hepatic: Comment: Elevated LFTs        Renal/Genitourinary:       Endo:     (+)          thyroid problem, hypothyroidism,    Obesity,       Psychiatric/Substance Use:       Infectious Disease:       Malignancy:       Other:            Physical Exam    Airway  airway  "exam normal           Respiratory Devices and Support         Dental       (+) Minor Abnormalities - some fillings, tiny chips      Cardiovascular   cardiovascular exam normal          Pulmonary   pulmonary exam normal                OUTSIDE LABS:  CBC:   Lab Results   Component Value Date    WBC 10.1 12/13/2019    WBC 7.2 10/09/2019    HGB 14.7 12/13/2019    HGB 14.0 10/09/2019    HCT 45.4 12/13/2019    HCT 42.3 10/09/2019     12/13/2019     10/09/2019     BMP:   Lab Results   Component Value Date     04/09/2021     12/13/2019    POTASSIUM 4.5 04/09/2021    POTASSIUM 4.3 12/13/2019    CHLORIDE 108 04/09/2021    CHLORIDE 107 12/13/2019    CO2 28 04/09/2021    CO2 25 12/13/2019    BUN 14 04/09/2021    BUN 9 12/13/2019    CR 0.70 04/09/2021    CR 0.69 12/13/2019    GLC 97 04/04/2024    GLC 98 03/02/2023     COAGS: No results found for: \"PTT\", \"INR\", \"FIBR\"  POC:   Lab Results   Component Value Date    HCG Negative 12/09/2019     HEPATIC:   Lab Results   Component Value Date    ALBUMIN 3.5 04/09/2021    PROTTOTAL 7.7 04/09/2021    ALT 44 04/09/2021    AST 24 04/09/2021    ALKPHOS 99 04/09/2021    BILITOTAL 1.0 04/09/2021     OTHER:   Lab Results   Component Value Date    A1C 5.1 03/02/2023    GLEN 8.8 04/09/2021    LIPASE 90 07/31/2014    TSH 3.25 04/04/2024    T4 0.98 02/13/2017       Anesthesia Plan    ASA Status:  3    NPO Status:  NPO Appropriate    Anesthesia Type: General.      Maintenance: Balanced.        Consents    Anesthesia Plan(s) and associated risks, benefits, and realistic alternatives discussed. Questions answered and patient/representative(s) expressed understanding.     - Discussed:     - Discussed with:  Patient            Postoperative Care            Comments:               Romero Gonzalez, APRN CRNA    I have reviewed the pertinent notes and labs in the chart from the past 30 days and (re)examined the patient.  Any updates or changes from those notes are reflected in " "this note.              # Severe Obesity: Estimated body mass index is 45.46 kg/m  as calculated from the following:    Height as of 4/4/24: 1.638 m (5' 4.5\").    Weight as of 4/4/24: 122 kg (269 lb).      "

## 2024-04-12 ENCOUNTER — ANESTHESIA (OUTPATIENT)
Dept: GASTROENTEROLOGY | Facility: CLINIC | Age: 44
End: 2024-04-12
Payer: COMMERCIAL

## 2024-04-12 ENCOUNTER — HOSPITAL ENCOUNTER (OUTPATIENT)
Facility: CLINIC | Age: 44
Discharge: HOME | End: 2024-04-12
Attending: STUDENT IN AN ORGANIZED HEALTH CARE EDUCATION/TRAINING PROGRAM | Admitting: STUDENT IN AN ORGANIZED HEALTH CARE EDUCATION/TRAINING PROGRAM
Payer: COMMERCIAL

## 2024-04-12 VITALS
OXYGEN SATURATION: 96 % | DIASTOLIC BLOOD PRESSURE: 76 MMHG | HEART RATE: 82 BPM | RESPIRATION RATE: 16 BRPM | TEMPERATURE: 98.1 F | SYSTOLIC BLOOD PRESSURE: 106 MMHG | BODY MASS INDEX: 45.93 KG/M2 | WEIGHT: 269 LBS | HEIGHT: 64 IN

## 2024-04-12 LAB — UPPER GI ENDOSCOPY: NORMAL

## 2024-04-12 PROCEDURE — 258N000003 HC RX IP 258 OP 636: Performed by: PHYSICIAN ASSISTANT

## 2024-04-12 PROCEDURE — 250N000009 HC RX 250

## 2024-04-12 PROCEDURE — 43239 EGD BIOPSY SINGLE/MULTIPLE: CPT | Performed by: STUDENT IN AN ORGANIZED HEALTH CARE EDUCATION/TRAINING PROGRAM

## 2024-04-12 PROCEDURE — 88305 TISSUE EXAM BY PATHOLOGIST: CPT | Mod: 26 | Performed by: PATHOLOGY

## 2024-04-12 PROCEDURE — 370N000017 HC ANESTHESIA TECHNICAL FEE, PER MIN: Performed by: STUDENT IN AN ORGANIZED HEALTH CARE EDUCATION/TRAINING PROGRAM

## 2024-04-12 PROCEDURE — 250N000011 HC RX IP 250 OP 636: Performed by: NURSE ANESTHETIST, CERTIFIED REGISTERED

## 2024-04-12 PROCEDURE — 88305 TISSUE EXAM BY PATHOLOGIST: CPT | Mod: TC | Performed by: STUDENT IN AN ORGANIZED HEALTH CARE EDUCATION/TRAINING PROGRAM

## 2024-04-12 PROCEDURE — 250N000009 HC RX 250: Performed by: NURSE ANESTHETIST, CERTIFIED REGISTERED

## 2024-04-12 RX ORDER — LIDOCAINE 40 MG/G
CREAM TOPICAL
Status: DISCONTINUED | OUTPATIENT
Start: 2024-04-12 | End: 2024-04-12 | Stop reason: HOSPADM

## 2024-04-12 RX ORDER — PROPOFOL 10 MG/ML
INJECTION, EMULSION INTRAVENOUS PRN
Status: DISCONTINUED | OUTPATIENT
Start: 2024-04-12 | End: 2024-04-12

## 2024-04-12 RX ORDER — SODIUM CHLORIDE, SODIUM LACTATE, POTASSIUM CHLORIDE, CALCIUM CHLORIDE 600; 310; 30; 20 MG/100ML; MG/100ML; MG/100ML; MG/100ML
INJECTION, SOLUTION INTRAVENOUS CONTINUOUS
Status: DISCONTINUED | OUTPATIENT
Start: 2024-04-12 | End: 2024-04-12 | Stop reason: HOSPADM

## 2024-04-12 RX ORDER — GLYCOPYRROLATE 0.2 MG/ML
INJECTION, SOLUTION INTRAMUSCULAR; INTRAVENOUS PRN
Status: DISCONTINUED | OUTPATIENT
Start: 2024-04-12 | End: 2024-04-12

## 2024-04-12 RX ORDER — LIDOCAINE HYDROCHLORIDE 20 MG/ML
INJECTION, SOLUTION INFILTRATION; PERINEURAL PRN
Status: DISCONTINUED | OUTPATIENT
Start: 2024-04-12 | End: 2024-04-12

## 2024-04-12 RX ADMIN — GLYCOPYRROLATE 0.2 MG: 0.2 INJECTION, SOLUTION INTRAMUSCULAR; INTRAVENOUS at 10:41

## 2024-04-12 RX ADMIN — LIDOCAINE HYDROCHLORIDE 100 MG: 20 INJECTION, SOLUTION INFILTRATION; PERINEURAL at 10:41

## 2024-04-12 RX ADMIN — TOPICAL ANESTHETIC 2 SPRAY: 200 SPRAY DENTAL; PERIODONTAL at 10:41

## 2024-04-12 RX ADMIN — LIDOCAINE HYDROCHLORIDE 0.1 ML: 10 INJECTION, SOLUTION EPIDURAL; INFILTRATION; INTRACAUDAL; PERINEURAL at 10:15

## 2024-04-12 RX ADMIN — SODIUM CHLORIDE, POTASSIUM CHLORIDE, SODIUM LACTATE AND CALCIUM CHLORIDE: 600; 310; 30; 20 INJECTION, SOLUTION INTRAVENOUS at 10:14

## 2024-04-12 RX ADMIN — PROPOFOL 200 MCG/KG/MIN: 10 INJECTION, EMULSION INTRAVENOUS at 10:41

## 2024-04-12 ASSESSMENT — ACTIVITIES OF DAILY LIVING (ADL)
ADLS_ACUITY_SCORE: 35
ADLS_ACUITY_SCORE: 35

## 2024-04-12 NOTE — LETTER
Jennifer Valentin  [address] [facility] [address]  April 15, 2024    Dear Jennifer,   This letter is to inform you of the results of your pathology report on your upper endoscopy (EGD).    Your pathology report was:  Showed findings consistent with a mildly inflamed stomach. If you continue to have symptoms please follow up with your primary care doctor or with myself.    If you have any questions or concerns please do not hesitate to call my office at [phone].  Sincerely,     Gus Peres MD  [facility]        Resulted Orders   Surgical Pathology Exam   Result Value Ref Range    Case Report       Surgical Pathology Report                         Case: [id number]                                  Authorizing Provider:  Gus Peres MD       Collected:           04/12/2024 10:43 AM          Ordering Location:     [facility]   Received:            04/12/2024 11:04 AM                                 [place]                                                                      Pathologist:           Yesi Wylie MD                                                          Specimens:   A) - Stomach, Antrum, antrum - R/O H pylori                                                         B) - Esophagus, Distal, distal esophagus                                                   Final Diagnosis       A(1).  Stomach, antrum, biopsy  -Antral and transitional type gastric mucosa with mild chronic inflammation.  - Negative for H. Pylori organisms on routine stains.  - Negative for intestinal metaplasia.   -Negative for dysplasia or malignancy      B(2).  Esophagus, distal, biopsy:  -Squamous mucosa with no significant pathological changes.  -Negative for intestinal metaplasia.  -Negative for acute or eosinophilic esophagitis.  -Negative for dysplasia or malignancy.          Clinical Information       Procedure:  ESOPHAGOGASTRODUODENOSCOPY, WITH BIOPSY  Pre-op Diagnosis:  "Gastroesophageal reflux disease with esophagitis without hemorrhage [K21.00]  Post-op Diagnosis: K21.00 - Gastroesophageal reflux disease with esophagitis without hemorrhage [ICD-10-CM]      Gross Description       A(1). Stomach, Antrum, antrum - R/O H pylori:  The specimen is received in formalin, labeled with the patient's name, medical record number and other identifying information designated \"stomach, antrum biopsy\". It consists of 2 tan soft tissue fragments, each 0.4 cm.  Entirely submitted in 1 cassette.    B(2). Esophagus, Distal, distal esophagus:  The specimen is received in formalin, labeled with the patient's name, medical record number and other identifying information designated \"distal esophagus biopsy\". It consists of 2 pale-tan soft tissue fragments, each 0.3 cm.  Entirely submitted in 1 cassette.  (Анна Garcia Biopsy Tech)      Microscopic Description       Microscopic examination was performed.      Performing Labs       The technical component of this testing was completed at [facility]      Case Images       "

## 2024-04-12 NOTE — H&P
[facility]    Pre-Endoscopy History and Physical     Jennifer Valentin MRN# [id number]   YOB: 1980 Age: 43 year old     Date of Procedure: (Not on file)  Primary care provider: Julissa Garza  Type of Endoscopy: Esophagogastroduodenoscopy with possible biopsy, possible dilation, possible foreign body removal  Reason for Procedure: Gastroesophageal reflux disease  Type of Anesthesia Anticipated: MAC    HPI:    Jennifer is a 43 year old female who will be undergoing the above procedure. No prior EGD. Reports persistent symptoms of heartburn that are uncontrolled with Omeprazole at this time. Prior abdominal surgical history includes cholecystectomy and hysterectomy. No anticoagulation use.    A history and physical has been performed. The patient's medications and allergies have been reviewed. The risks and benefits of the procedure and the sedation options and risks were discussed with the patient.  All questions were answered and informed consent was obtained.      She denies a personal or family history of anesthesia complications or bleeding disorders.     Patient Active Problem List   Diagnosis    Family history of thyroid disease    Subclinical hypothyroidism    Elevated LFTs    Class 3 severe obesity due to excess calories without serious comorbidity with body mass index (BMI) of 45.0 to 49.9 in adult (H)    Fibrocystic disease of left breast        Past Medical History:   Diagnosis Date    BACKACHE NOS 10/10/2006    Menarche age 11    cycles q mo x 6 d occasional cramps    Obese     Thyroid disease         Past Surgical History:   Procedure Laterality Date    COLONOSCOPY  8/4/2014    Procedure: COLONOSCOPY;  Surgeon: Lyndon Stark MD;  Location: [facility]    CYSTOSCOPY N/A 12/9/2019    Procedure: Cystoscopy;  Surgeon: Yash Welsh MD;  Location: [facility]    HYSTERECTOMY, PAP NO LONGER INDICATED      LAPAROSCOPIC CHOLECYSTECTOMY WITH CHOLANGIOGRAMS  7/29/2014  "   Procedure: LAPAROSCOPIC CHOLECYSTECTOMY WITH CHOLANGIOGRAMS;  Surgeon: Julius Butt MD;  Location: [facility]    LAPAROSCOPIC HYSTERECTOMY TOTAL, BILATERAL SALPINGO-OOPHORECTOMY, COMBINED Bilateral 2019    Procedure: HYSTERECTOMY, TOTAL, LAPAROSCOPIC, WITH SALPINGECTOMY & CYSTOSCOPY & EXCISION OF VAGINAL NODULE;  Surgeon: Yash Welsh MD;  Location: [facility]    WRIST SURGERY Right     ZZC  DELIVERY ONLY  2002       Social History     Tobacco Use    Smoking status: Never     Passive exposure: Never    Smokeless tobacco: Never   Substance Use Topics    Alcohol use: No       Family History   Problem Relation Age of Onset    Thyroid Disease Mother 30        hypothyroidism.     Anxiety Disorder Mother     Depression Mother     Hypertension Mother     Musculoskeletal Disorder Father          in a motorcycle accident , age 20's or early 30's    Genitourinary Problems Son         kidney reflux    Other Cancer Other     Depression Brother     C.A.D. No family hx of     Diabetes No family hx of     Breast Cancer No family hx of     Cancer - colorectal No family hx of     Prostate Cancer No family hx of     Colon Cancer No family hx of     Coronary Artery Disease No family hx of        Prior to Admission medications    Medication Sig Start Date End Date Taking? Authorizing Provider   levothyroxine (SYNTHROID/LEVOTHROID) 25 MCG tablet Take 1 tablet (25 mcg) by mouth daily 24  Yes Julissa Garza APRN CNP   omeprazole (PRILOSEC) 20 MG DR capsule Take 1 capsule (20 mg) by mouth daily 24  Yes Julissa Garza APRN CNP       Allergies   Allergen Reactions    Metronidazole Hives        REVIEW OF SYSTEMS:   5 point ROS negative except as noted above in HPI, including Gen., Resp., CV, GI &  system review.    PHYSICAL EXAM:   LMP 2019 (LMP Unknown)  Estimated body mass index is 45.46 kg/m  as calculated from the following:    Height as of 24: 1.638 m (5' 4.5\").    Weight as " of 4/4/24: 122 kg (269 lb).   Constitutional: Awake, alert, no acute distress.  Eyes: No scleral icterus.  Conjunctiva are without injection.  ENMT: Mucous membranes moist, dentition and gums are intact.   Neck: Soft, supple, trachea midline.    Endocrine: n/a   Lymphatic: There is no cervical, submandibularadenopathy.  Respiratory: normal effortgs   Cardiovascular: S1, S2  Abdomen: Non-distended, non-tender,  No masses,  Musculoskeletal: No spinal or CVA tenderness. Full range of motion in the upper and lower extremities.    Skin: No skin rashes or lesions to inspection.  No petechia.    Neurologic: alerted and oriented 3x  Psychiatric: The patient's affect is not blunted and mood is appropriate.  DIAGNOSTICS:    Not indicated    IMPRESSION   ASA Class 2 - Mild systemic disease    PLAN:   Plan for Esophagogastroduodenoscopy with possible biopsy, possible dilation, possible foreign body removal. We discussed the risks, benefits and alternatives and the patient wished to proceed.  Patient is cleared for the above procedure.    The above has been forwarded to the consulting provider.    Mita Terry DO, PGY - 2   [facility]

## 2024-04-12 NOTE — ANESTHESIA POSTPROCEDURE EVALUATION
Patient: Jennifer Valentin    Procedure: Procedure(s):  ESOPHAGOGASTRODUODENOSCOPY, WITH BIOPSY       Anesthesia Type:  General    Note:  Disposition: Outpatient   Postop Pain Control: Uneventful            Sign Out: Well controlled pain   PONV: No   Neuro/Psych: Uneventful            Sign Out: Acceptable/Baseline neuro status   Airway/Respiratory: Uneventful            Sign Out: Acceptable/Baseline resp. status   CV/Hemodynamics: Uneventful            Sign Out: Acceptable CV status; No obvious hypovolemia; No obvious fluid overload   Other NRE: NONE   DID A NON-ROUTINE EVENT OCCUR? No           Last vitals:  Vitals:    04/12/24 0957   BP: 132/77   Pulse: 87   Resp: 16   Temp: 36.7  C (98  F)   SpO2: 98%       Electronically Signed By: NEO Andrade CRNA  April 12, 2024  10:56 AM

## 2024-04-12 NOTE — ANESTHESIA CARE TRANSFER NOTE
Patient: Jennifer Valentin    Procedure: Procedure(s):  ESOPHAGOGASTRODUODENOSCOPY, WITH BIOPSY       Diagnosis: Gastroesophageal reflux disease with esophagitis without hemorrhage [K21.00]  Diagnosis Additional Information: No value filed.    Anesthesia Type:   General     Note:    Oropharynx: oropharynx clear of all foreign objects and spontaneously breathing  Level of Consciousness: awake  Oxygen Supplementation: room air    Independent Airway: airway patency satisfactory and stable  Dentition: dentition unchanged  Vital Signs Stable: post-procedure vital signs reviewed and stable  Report to RN Given: handoff report given  Patient transferred to: Phase II    Handoff Report: Identifed the Patient, Identified the Reponsible Provider, Reviewed the pertinent medical history, Discussed the surgical course, Reviewed Intra-OP anesthesia mangement and issues during anesthesia, Set expectations for post-procedure period and Allowed opportunity for questions and acknowledgement of understanding      Vitals:  Vitals Value Taken Time   BP     Temp     Pulse     Resp     SpO2         Electronically Signed By: NEO Andrade CRNA  April 12, 2024  10:56 AM

## 2024-04-15 LAB
PATH REPORT.COMMENTS IMP SPEC: NORMAL
PATH REPORT.COMMENTS IMP SPEC: NORMAL
PATH REPORT.FINAL DX SPEC: NORMAL
PATH REPORT.GROSS SPEC: NORMAL
PATH REPORT.MICROSCOPIC SPEC OTHER STN: NORMAL
PATH REPORT.RELEVANT HX SPEC: NORMAL
PHOTO IMAGE: NORMAL

## 2024-04-19 ENCOUNTER — MYC MEDICAL ADVICE (OUTPATIENT)
Dept: FAMILY MEDICINE | Facility: CLINIC | Age: 44
End: 2024-04-19
Payer: COMMERCIAL

## 2024-04-19 NOTE — TELEPHONE ENCOUNTER
Does not appear provider has viewed results at this time. Routing to pcp to advise    Nayeli Mauricio RN on 4/19/2024 at 12:19 PM

## 2024-05-29 DIAGNOSIS — E03.8 SUBCLINICAL HYPOTHYROIDISM: ICD-10-CM

## 2024-05-29 RX ORDER — LEVOTHYROXINE SODIUM 25 UG/1
25 TABLET ORAL DAILY
Qty: 90 TABLET | Refills: 3 | OUTPATIENT
Start: 2024-05-29

## 2024-06-03 ENCOUNTER — MYC MEDICAL ADVICE (OUTPATIENT)
Dept: FAMILY MEDICINE | Facility: CLINIC | Age: 44
End: 2024-06-03
Payer: COMMERCIAL

## 2024-06-03 DIAGNOSIS — R10.12 LUQ ABDOMINAL PAIN: Primary | ICD-10-CM

## 2024-06-03 DIAGNOSIS — R53.83 OTHER FATIGUE: ICD-10-CM

## 2024-06-04 NOTE — TELEPHONE ENCOUNTER
4/4/24 OV:  Gastroesophageal reflux disease with esophagitis without hemorrhage  Continues with uncontrolled symptoms with omeprazole daily.  Will arrange for upper endoscopy.  - Adult GI  Referral - Procedure Only; Future  - omeprazole (PRILOSEC) 20 MG DR capsule; Take 1 capsule (20 mg) by mouth daily    4/12/24 UPPER GI ENDOSCOPY:  Recommendation:        - Discharge patient to home (ambulatory).                          - Resume previous diet.                          - Continue present medications.                          - Await pathology results.                          - Return to referring physician in 2 weeks.     Do you want follow up appointment or refer to GI?    Shaye Rosario, RN on 6/4/2024 at 8:57 AM

## 2024-06-24 ENCOUNTER — HOSPITAL ENCOUNTER (OUTPATIENT)
Dept: ULTRASOUND IMAGING | Facility: CLINIC | Age: 44
Discharge: HOME | End: 2024-06-24
Attending: NURSE PRACTITIONER | Admitting: NURSE PRACTITIONER
Payer: COMMERCIAL

## 2024-06-24 DIAGNOSIS — R10.12 LUQ ABDOMINAL PAIN: Primary | ICD-10-CM

## 2024-06-24 DIAGNOSIS — R10.12 LUQ ABDOMINAL PAIN: ICD-10-CM

## 2024-06-24 PROCEDURE — 76700 US EXAM ABDOM COMPLETE: CPT

## 2024-06-25 ENCOUNTER — LAB (OUTPATIENT)
Dept: LAB | Facility: CLINIC | Age: 44
End: 2024-06-25
Payer: COMMERCIAL

## 2024-06-25 DIAGNOSIS — R53.83 OTHER FATIGUE: ICD-10-CM

## 2024-06-25 DIAGNOSIS — R10.12 LUQ ABDOMINAL PAIN: ICD-10-CM

## 2024-06-25 LAB
ALBUMIN UR-MCNC: NEGATIVE MG/DL
APPEARANCE UR: CLEAR
BILIRUB UR QL STRIP: NEGATIVE
COLOR UR AUTO: YELLOW
CORTIS SERPL-MCNC: 17 UG/DL
ESTRADIOL SERPL-MCNC: 211 PG/ML
FSH SERPL IRP2-ACNC: 7.5 MIU/ML
GLUCOSE UR STRIP-MCNC: NEGATIVE MG/DL
HGB UR QL STRIP: NEGATIVE
KETONES UR STRIP-MCNC: NEGATIVE MG/DL
LEUKOCYTE ESTERASE UR QL STRIP: NEGATIVE
NITRATE UR QL: NEGATIVE
PH UR STRIP: 6.5 [PH] (ref 5–7)
SP GR UR STRIP: 1.02 (ref 1–1.03)
UROBILINOGEN UR STRIP-ACNC: 0.2 E.U./DL
VIT D+METAB SERPL-MCNC: 32 NG/ML (ref 20–50)

## 2024-06-25 PROCEDURE — 36415 COLL VENOUS BLD VENIPUNCTURE: CPT

## 2024-06-25 PROCEDURE — 81003 URINALYSIS AUTO W/O SCOPE: CPT

## 2024-06-25 PROCEDURE — 83001 ASSAY OF GONADOTROPIN (FSH): CPT

## 2024-06-25 PROCEDURE — 82533 TOTAL CORTISOL: CPT

## 2024-06-25 PROCEDURE — 82306 VITAMIN D 25 HYDROXY: CPT

## 2024-06-25 PROCEDURE — 82670 ASSAY OF TOTAL ESTRADIOL: CPT

## 2024-06-26 NOTE — TELEPHONE ENCOUNTER
REFERRAL INFORMATION:  Referring Provider:  Julissa Garza APRN CNP   Referring Clinic:  [facility]   Reason for Visit/Diagnosis:  LUQ abdominal pain      FUTURE VISIT INFORMATION:  Appointment Date:   Appointment Time:      NOTES STATUS DETAILS   OFFICE NOTE from Referring Provider Internal 4/4/2024, 9/23/2020 OV with MARILYN Garza   OFFICE NOTE from Other [facility]ist [facility] Everywhere [facility]:   5/25/2023 OV with RAMIN Orozco   HOSPITAL DISCHARGE SUMMARY/  ED VISITS N/A    OPERATIVE REPORT N/A    MEDICATION LIST Internal         ENDOSCOPY  Internal 4/12/2024    COLONOSCOPY N/A    IMAGING (CT, MRI, EGD, MRCP, Small Bowel Follow Through/SBT, MR/CT Enterography) Internal/CE 6/24/2024 US ABD     [facility]:   5/25/2023 CT ABD PEL

## 2024-08-26 ENCOUNTER — PRE VISIT (OUTPATIENT)
Dept: GASTROENTEROLOGY | Facility: CLINIC | Age: 44
End: 2024-08-26

## 2024-08-26 ENCOUNTER — VIRTUAL VISIT (OUTPATIENT)
Dept: GASTROENTEROLOGY | Facility: CLINIC | Age: 44
End: 2024-08-26
Attending: NURSE PRACTITIONER
Payer: COMMERCIAL

## 2024-08-26 DIAGNOSIS — R10.12 LUQ ABDOMINAL PAIN: ICD-10-CM

## 2024-08-26 PROCEDURE — 99204 OFFICE O/P NEW MOD 45 MIN: CPT | Mod: 95 | Performed by: PHYSICIAN ASSISTANT

## 2024-08-26 NOTE — NURSING NOTE
Current patient location: [address] [place] [id number]    Is the patient currently in the state of [place]? YES    Visit mode:VIDEO    If the visit is dropped, the patient can be reconnected by: VIDEO VISIT: Text to cell phone:   Telephone Information:   Mobile [phone]       Will anyone else be joining the visit? NO  (If patient encounters technical issues they should call [phone]    How would you like to obtain your AVS? MyChart    Are changes needed to the allergy or medication list? No    Are refills needed on medications prescribed by this physician? NO    Rooming Documentation:  Questionnaire(s) completed      Reason for visit: Consult    Ganga MELGARF

## 2024-08-26 NOTE — PROGRESS NOTES
GASTROENTEROLOGY NEW PATIENT VIDEO VISIT    CC/REFERRING MD:    Julissa Garza    REASON FOR CONSULTATION:   Julissa Garza for   Chief Complaint   Patient presents with    Consult       HISTORY OF PRESENT ILLNESS:    Jennifer Valentin is a 43 year old female with a past medical history significant for hypothyroidism who is being evaluated via a billable video visit for evaluation of chronic upper abdominal pain.  To review, the patient describes a 10+ year history of recurrent sharp pain in the left upper quadrant.  She gets this pain typically daily.  It does not seem to be linked to dietary intake, any specific foods, or specific time of day.  She has not had any pain symptoms at night.  Interestingly, when this pain started 10 years ago, she had laboratory testing that was notable for mildly elevated liver enzymes.  She subsequently had an ultrasound that showed evidence of gallbladder sludge.  She then underwent cholecystectomy.  Reading the operative report, she had a gallstone in the cystic duct that was dislodged, then had a normal cholangiogram.  She then underwent cholecystectomy and pathology showed minimal chronic inflammation and no other stones in the gallbladder.  Unfortunately, her pain did not improve after that.  She did have a colonoscopy shortly after due to concerns of bloody diarrhea and this looked entirely normal.    Over the years, she has continued to deal with the pain.  She has been prescribed antacids with limited effect.  She continues to take 20 mg omeprazole daily.  She did have CT abdomen and pelvis in May of last year along with labs that showed no specific abnormal findings.  Her other surgical history includes  over 20 years ago as well as hysterectomy for menorrhagia in 2019.    She underwent EGD last April that showed essentially no abnormal findings in the esophagus, stomach, or duodenum, with just minimal chronic  inflammation on biopsies of the stomach.    There is no tobacco, alcohol, or drug use.  No pertinent family medical history of digestive diseases.      I have reviewed and updated the patient's Past Medical History, Social History, Family History and Medication List.    Exam:    General appearance:  Healthy appearing adult, in no acute distress  Eyes:  Sclera anicteric, Pupils round and reactive to light  Ears, nose, mouth and throat:  No obvious external lesions of ears and nose.  Hearing intact  Neck:  Symmetric, No obvious external lesions  Respiratory:  Normal respiration, no use of accessory muscles   MSK:  No visual upper extremity, neck or facial muscle atrophy  ABD:  No visual abdominal distention, no audible borborygmi  Skin:  No rashes or jaundice   Psychiatric:  Oriented to person, place and time, Appropriate mood and affect.   Neurologic:  Peripheral muscle function and dexterity appear to be intact      PERTINENT STUDIES have been reviewed.    ASSESSMENT/PLAN:    Jennifer Valentin is a 43 year old female who presents for evaluation of chronic recurring sharp pain in the left upper quadrant of the abdomen.  Reviewed recent EGD findings, no evidence of gastroduodenal disease that would explain the symptoms.  Interestingly, the symptoms actually predate her gallbladder surgery, and although she did have some real pathology with the gallbladder, it apparently was not the source of her current symptoms.  We reviewed some other potential etiologies, including functional dyspepsia and gastroparesis, would also consider atypical presentation of IBD.  It would also be unlikely for her to have any postoperative complications this long after surgery.  Plan for laboratory testing and gastric emptying scan as detailed below.  I will follow-up with patient after results are in to discuss findings and next steps.    1. LUQ abdominal pain  - Adult GI  Referral - Consult Only  - NM Gastric Emptying; Future  -  CRP, inflammation; Future  - Calprotectin Feces; Future  - Hepatic panel (Albumin, ALT, AST, Bili, Alk Phos, TP); Future  - Lipase; Future        Video-Visit Details    Video Visit Time: 13 minutes    Type of service:  Video Visit    Originating Location (pt. Location): Home    Distant Location (provider location):  Off-site    Platform used for Video Visit: Eulalia Solano PA-C    Thank you for this consultation.  It was a pleasure to participate in the care of this patient; please contact us with any further questions.  A total of 20 minutes was spent with reviewing the chart, discussing with the patient, documentation and coordination of care.    This note was created with voice recognition software, and while reviewed for accuracy, typos may remain.     Giovanni Solano PA-C  Division of Gastroenterology, Hepatology and Nutrition  [facility]  [phone]

## 2024-09-03 ENCOUNTER — HOSPITAL ENCOUNTER (OUTPATIENT)
Dept: NUCLEAR MEDICINE | Facility: CLINIC | Age: 44
Setting detail: NUCLEAR MEDICINE
Discharge: HOME | End: 2024-09-03
Attending: PHYSICIAN ASSISTANT | Admitting: PHYSICIAN ASSISTANT
Payer: COMMERCIAL

## 2024-09-03 DIAGNOSIS — R10.12 LUQ ABDOMINAL PAIN: ICD-10-CM

## 2024-09-03 PROCEDURE — 78264 GASTRIC EMPTYING IMG STUDY: CPT

## 2024-09-03 PROCEDURE — A9541 TC99M SULFUR COLLOID: HCPCS | Performed by: PHYSICIAN ASSISTANT

## 2024-09-03 PROCEDURE — 343N000001 HC RX 343: Performed by: PHYSICIAN ASSISTANT

## 2024-09-03 RX ADMIN — Medication 1.1 MILLICURIE: at 07:58

## 2024-09-05 ENCOUNTER — LAB (OUTPATIENT)
Dept: LAB | Facility: CLINIC | Age: 44
End: 2024-09-05
Payer: COMMERCIAL

## 2024-09-05 ENCOUNTER — TELEPHONE (OUTPATIENT)
Dept: GASTROENTEROLOGY | Facility: CLINIC | Age: 44
End: 2024-09-05

## 2024-09-05 DIAGNOSIS — R10.12 LUQ ABDOMINAL PAIN: ICD-10-CM

## 2024-09-05 DIAGNOSIS — K31.84 GASTROPARESIS: Primary | ICD-10-CM

## 2024-09-05 PROCEDURE — 83690 ASSAY OF LIPASE: CPT

## 2024-09-05 PROCEDURE — 80076 HEPATIC FUNCTION PANEL: CPT

## 2024-09-05 PROCEDURE — 36415 COLL VENOUS BLD VENIPUNCTURE: CPT

## 2024-09-05 PROCEDURE — 86140 C-REACTIVE PROTEIN: CPT

## 2024-09-05 RX ORDER — METOCLOPRAMIDE 5 MG/1
5 TABLET ORAL 2 TIMES DAILY PRN
Qty: 30 TABLET | Refills: 0 | Status: SHIPPED | OUTPATIENT
Start: 2024-09-05

## 2024-09-05 NOTE — TELEPHONE ENCOUNTER
I called patient and notified her of gastric emptying scan results, which came back very abnormal with 47% retention at 4 hours.  I do think this is a plausible explanation for her symptoms.  We will have her trial a brief course of metoclopramide, reviewed the risks and benefits of this medication.  We will also have her consult with dietitian for gastroparesis diet.  She does have EGD from a year ago that did not show any obstructive findings in the duodenum, and so I do not think this is likely the result of gastric outlet obstruction.  Similarly, I do not suspect her medications are playing a role in delayed gastric emptying, so this is likely considered to be idiopathic gastroparesis.  We will see how she does with brief course of metoclopramide and dietary change.  If suboptimally effective, may consider referral to advanced endoscopy for RAGHAVENDRA ACOSTA.    Giovanni Solano PA-C

## 2024-09-06 LAB
ALBUMIN SERPL BCG-MCNC: 4.2 G/DL (ref 3.5–5.2)
ALP SERPL-CCNC: 121 U/L (ref 40–150)
ALT SERPL W P-5'-P-CCNC: 45 U/L (ref 0–50)
AST SERPL W P-5'-P-CCNC: 35 U/L (ref 0–45)
BILIRUB DIRECT SERPL-MCNC: <0.2 MG/DL (ref 0–0.3)
BILIRUB SERPL-MCNC: 0.6 MG/DL
CRP SERPL-MCNC: 6.14 MG/L
LIPASE SERPL-CCNC: 38 U/L (ref 13–60)
PROT SERPL-MCNC: 7.6 G/DL (ref 6.4–8.3)

## 2024-09-06 PROCEDURE — 83993 ASSAY FOR CALPROTECTIN FECAL: CPT

## 2024-09-09 LAB — CALPROTECTIN STL-MCNT: 94.7 MG/KG (ref 0–49.9)

## 2024-09-10 DIAGNOSIS — R19.5 ELEVATED FECAL CALPROTECTIN: Primary | ICD-10-CM

## 2024-09-10 NOTE — RESULT ENCOUNTER NOTE
Paul Rodriguez,    Your stool test screening for inflammation came back slightly abnormal -I do not think this likely represents inflammation coming from the colon, as this level can be slightly elevated in patients taking proton pump inhibitors.  It would be reasonable to recheck this test in about a month to make sure it is not changing.  I will place a future order for this.    Please let me know if you have any questions.    Sincerely,  Giovanni DIEHL [facility], Hepatology, [facility] Nutrition

## 2024-09-11 ENCOUNTER — VIRTUAL VISIT (OUTPATIENT)
Dept: GASTROENTEROLOGY | Facility: CLINIC | Age: 44
End: 2024-09-11
Attending: PHYSICIAN ASSISTANT
Payer: COMMERCIAL

## 2024-09-11 VITALS — WEIGHT: 260 LBS | BODY MASS INDEX: 43.96 KG/M2

## 2024-09-11 DIAGNOSIS — K31.84 GASTROPARESIS: Primary | ICD-10-CM

## 2024-09-11 PROCEDURE — 99207 PR NO CHARGE LOS: CPT | Mod: 95 | Performed by: DIETITIAN, REGISTERED

## 2024-09-11 PROCEDURE — 97802 MEDICAL NUTRITION INDIV IN: CPT | Mod: 95 | Performed by: DIETITIAN, REGISTERED

## 2024-09-11 ASSESSMENT — PAIN SCALES - GENERAL: PAINLEVEL: MILD PAIN (3)

## 2024-09-11 NOTE — LETTER
"9/11/2024      Jennifer Valentin  [address] [facility] [address]      Dear Colleague,    Thank you for referring your patient, Jennifer Valentin, to the [facility] [place]. Please see a copy of my visit note below.    Virtual Visit Details    Type of service:  Video Visit     Originating Location (pt. Location): Home  Distant Location (provider location):  Off-site  Platform used for Video Visit: [facility] Outpatient Medical Nutrition Therapy      Time Spent:  26 minutes  Session Type:  Initial  Referring Physician:  Giovanni Solano PA-C  Reason for RD Visit:   gastroparesis     Nutrition Assessment:  Patient is a 43 year old female with history that includes gastroparesis and noted very abnormal delay per GES. She c/o ongoing issues with chronic abdominal pain. Hx gallbladder sludge, gallstone and hx cholecystectomy and hysterectomy. She stated that she has stomach pain all of the time which can be worse with/after eating. She typically eats 3 meals per day and doesn't snack. She drinks a lot of fluids,  oz water per day and has 2% lactose free Fairlife milk with breakfast and dinner and then diet pop with lunch. She eats something lighter at breakfast, has a protein drink and banana for lunch and then dinner is her bigger meal of the day with the family. She has been walking for 30 minutes at night after dinner which is helpful especially since dinner is her bigger meal of the day. She reported having variable stools. She stated that she was prescribed medication (reglan) for her gastroparesis but first wanted to have diet education and try some dietary changes to see if helpful d/t concern for some possible side effects of the medication. She has not previously had diet education for gastroparesis so interested in diet education at visit today.    Height:   Ht Readings from Last 1 Encounters:   04/12/24 1.638 m (5' 4.49\")     Weight: she " "denied having any/any significant weight loss.   Wt Readings from Last 10 Encounters:   09/11/24 117.9 kg (260 lb)   04/12/24 122 kg (269 lb)   04/04/24 122 kg (269 lb)   03/02/23 119.7 kg (264 lb)   11/02/22 115.8 kg (255 lb 6.4 oz)   04/06/22 114.3 kg (252 lb)   02/28/22 116.7 kg (257 lb 4 oz)   10/26/21 108.9 kg (240 lb)   07/05/21 113.5 kg (250 lb 5 oz)   03/18/21 114.1 kg (251 lb 9.6 oz)     BMI: Estimated body mass index is 43.96 kg/m  as calculated from the following:    Height as of 4/12/24: 1.638 m (5' 4.49\").    Weight as of this encounter: 117.9 kg (260 lb).    Diet Recall:  (some usual/recent meals/snacks/beverages):  Meal Food    Breakfast Banana bread   Lunch Protein shake and banana   Dinner Chicken stroganoff and pasta or eggs and pancake   Snacks Not usually    Beverages Fairlife 2% Milk with B and dinner, 1 c coffee with fr van creamer,  oz water, 1 can mtn dew zero with lunch   Alcohol Intake Very occas 1 wine with seltzer every 3-4 months.     Frequency of eating/taking out meals: on Fridays: door dash     Labs:    Last Comprehensive Metabolic Panel:  Sodium   Date Value Ref Range Status   04/09/2021 139 133 - 144 mmol/L Final     Potassium   Date Value Ref Range Status   04/09/2021 4.5 3.4 - 5.3 mmol/L Final     Chloride   Date Value Ref Range Status   04/09/2021 108 94 - 109 mmol/L Final     Carbon Dioxide   Date Value Ref Range Status   04/09/2021 28 20 - 32 mmol/L Final     Anion Gap   Date Value Ref Range Status   04/09/2021 3 3 - 14 mmol/L Final     Glucose   Date Value Ref Range Status   04/04/2024 97 70 - 99 mg/dL Final   03/02/2023 98 70 - 99 mg/dL Final   04/09/2021 93 70 - 99 mg/dL Final     Comment:     Fasting specimen     Urea Nitrogen   Date Value Ref Range Status   04/09/2021 14 7 - 30 mg/dL Final     Creatinine   Date Value Ref Range Status   04/09/2021 0.70 0.52 - 1.04 mg/dL Final     GFR Estimate   Date Value Ref Range Status   04/09/2021 >90 >60 mL/min/[1.73_m2] Final    "  Comment:     Non  GFR Calc  Starting 12/18/2018, serum creatinine based estimated GFR (eGFR) will be   calculated using the Chronic Kidney Disease Epidemiology Collaboration   (CKD-EPI) equation.       Calcium   Date Value Ref Range Status   04/09/2021 8.8 8.5 - 10.1 mg/dL Final     CBC RESULTS:   Recent Labs   Lab Test 12/13/19  1056   WBC 10.1   RBC 5.03   HGB 14.7   HCT 45.4   MCV 90   MCH 29.2   MCHC 32.4   RDW 12.1          Pertinent Medications/vitamin and mineral supplements:      Current Outpatient Medications   Medication Sig Dispense Refill     levothyroxine (SYNTHROID/LEVOTHROID) 25 MCG tablet Take 1 tablet (25 mcg) by mouth daily 90 tablet 3     metoclopramide (REGLAN) 5 MG tablet Take 1 tablet (5 mg) by mouth 2 times daily as needed (abdominal pain). 30 tablet 0     omeprazole (PRILOSEC) 20 MG DR capsule Take 1 capsule (20 mg) by mouth daily 90 capsule 3     No current facility-administered medications for this visit.     Food Allergies:  NKFA   Physical Activity:  walking for 30 minutes after dinner.    MALNUTRITION:  % Weight Loss:  None noted  % Intake:  No decreased intake noted  Subcutaneous Fat Loss:  None observed  Muscle Loss:  None observed  Fluid Retention:  None noted    Malnutrition Diagnosis: Patient does not meet two of the above criteria necessary for diagnosing malnutrition  In Context of:  Chronic illness or disease    Nutrition Prescription: Nutrition Education     Nutrition Intervention      Provided diet education for gastroparesis, abdominal pain.    Answered patient's questions. Patient verbalized understanding of education provided. See Goals below.     Educational Materials Provided:  Nutrition care manual: gastroparesis nutrition therapy    Goals:    1. Eat 4-6 smaller meals per day that are lower in fat and lower in fiber.    For low fiber diet:  -Avoid raw vegetables and cook them very well before eating.  -Avoid corn, peas and beans such as black  beans, kidney beans, navy beans (even if cooked still high in fiber).  -Canned fruit in its own juice is lower in fiber, as well as ripe bananas, ripe melon, unsweetened applesauce.  -Avoid whole grain breads/crackers (choose white bread, low fiber crackers, white rice).  -Avoid whole nuts and seeds.  -Okay for small amounts of creamy nut butters. Avoid chunky nut butters.    For low fat diet:  -Choose lean proteins/lean cuts of meats. Remove skins from chicken and turkey breast, fish (not breaded and not fried), lean cuts of pork (tenderloin, lean pork chop, limit beef meat and if having choose lean cut or 93% lean ground beef. Can substitute with ground turkey or ground chicken for beef.  -Removed fat from meats before cooking.  -Use lower fat cooking methods such as baking, broiling, grilling.   -Limit using a lot of fats/oil/high fat sauces/dressing/butter/gravies when preparing foods and limit the amount used on foods.  -Choose skim or 1% based dairy products (such as lowfat yogurts, skim/1% milk, non fat/1% cottage cheese, lowfat pudding).  -Do not eat large amounts of nut butters, avocado.    2. Can have protein drink as a small meal/snack if better tolerated. These drinks count towards those 4-6 smaller meals.    --you could have a second protein drink each day for example as an afternoon snack/small meal and then eat a smaller amount at dinner as this may feel more comfortable and prevent overeating at dinner meal.    -some protein drink examples include using a protein powder of your choice (such as your Obvi protein powder) and mixing with water/skim or lowfat milk lactose free milk or lowfat milk substitute, or Premier protein, Ensure max, Ensure high protein, Boost high protein.    3. Chew food very well before swallowing.    4. Limit fluids with meals (only take small sips as needed) and drink the rest of your fluids in between meals.    --Limit carbonated beverages such as pop and carbonated water) as  this may cause some bloating, stomach pain.     5. Continue to drink at least 8 cups (64 oz) or more water per day (drink mostly in between meals).    6. Continue daily walking as this may help relieve some stomach discomfort and help with some constipation.    Nutrition Monitoring and Evaluation: Will monitor adherence to nutrition recommendations at future RD visits.     Further Medical Nutrition Therapy:  Follow-up scheduled 10/23/24    Patient was encouraged to contact RD with any further questions.    Jennifer Haley MS, RD, LD        Again, thank you for allowing me to participate in the care of your patient.        Sincerely,        Jennifer Haley RD

## 2024-09-11 NOTE — NURSING NOTE
Current patient location: Patient declined to provide     Is the patient currently in the state of [place]? YES    Visit mode:VIDEO    If the visit is dropped, the patient can be reconnected by: VIDEO VISIT: Text to cell phone:   Telephone Information:   Mobile [phone]       Will anyone else be joining the visit? NO  (If patient encounters technical issues they should call [phone])    How would you like to obtain your AVS? MyChart    Are changes needed to the allergy or medication list? No    Are refills needed on medications prescribed by this physician? NO    Rooming Documentation:  Questionnaire(s) completed      Reason for visit: Video Visit (Consult)    Germaine GALLARDO

## 2024-09-11 NOTE — PATIENT INSTRUCTIONS
It was nice meeting you today. Below are the nutrition recommendations we discussed at your visit.    Please let me know if you have any additional questions.    Nutrition Recommendations    1. Eat 4-6 smaller meals per day that are lower in fat and lower in fiber.    --based on your usual meal pattern now, you could add in a planned snack/small meal or plan to have a second protein drink or half serving between lunch and dinner to help prevent overeating at dinner meal (this would give you 4 smaller meals per day).    For low fiber diet:  -Avoid raw vegetables and cook them very well before eating.  -Avoid corn, peas and beans such as black beans, kidney beans, navy beans (even if cooked still high in fiber).  -Canned fruit in its own juice is lower in fiber, as well as ripe bananas, ripe melon, unsweetened applesauce.  -Avoid whole grain breads/crackers (choose white bread, low fiber crackers, white rice).  -Avoid whole nuts and seeds.  -Okay for small amounts of creamy nut butters. Avoid chunky nut butters.    For low fat diet:  -Choose lean proteins/lean cuts of meats. Remove skins from chicken and turkey breast, fish (not breaded and not fried), lean cuts of pork (tenderloin, lean pork chop, limit beef meat and if having choose lean cut or 93% lean ground beef. Can substitute with ground turkey or ground chicken for beef.  -Removed fat from meats before cooking.  -Use lower fat cooking methods such as baking, broiling, grilling.   -Limit using a lot of fats/oil/high fat sauces/dressing/butter/gravies when preparing foods and limit the amount used on foods.  -Choose skim or 1% based dairy products (such as lowfat yogurts, skim/1% milk, non fat/1% cottage cheese, lowfat pudding).  -Do not eat large amounts of nut butters, avocado.    2. Can have protein drink as a small meal/snack if better tolerated. These drinks count towards those 4-6 smaller meals.    --you could have a second protein drink each day for  example as an afternoon snack/small meal and then eat a smaller amount at dinner as this may feel more comfortable and prevent overeating at dinner meal.    -some protein drink examples include using a protein powder of your choice (such as your Obvi protein powder) and mixing with water/skim or lowfat milk lactose free milk or lowfat milk substitute, or Premier protein, Ensure max, Ensure high protein, Boost high protein.    3. Chew food very well before swallowing.    4. Limit fluids with meals (only take small sips as needed) and drink the rest of your fluids in between meals.    --Limit carbonated beverages such as pop and carbonated water) as this may cause some bloating, stomach pain.     5. Continue to drink at least 8 cups (64 oz) or more water per day (drink mostly in between meals).    6. Continue daily walking as this may help relieve some stomach discomfort and help with some constipation.    Your follow-up video appointment is scheduled for October 23, 2024 at 3:30 PM. If you need to make any changes to your appointment, please call [phone].    Thank you,    Jennifer Haley, MS, RD, LD

## 2024-09-11 NOTE — PROGRESS NOTES
"Virtual Visit Details    Type of service:  Video Visit     Originating Location (pt. Location): Home  Distant Location (provider location):  Off-site  Platform used for Video Visit: [facility] Outpatient Medical Nutrition Therapy      Time Spent:  26 minutes  Session Type:  Initial  Referring Physician:  Giovanni Solano PA-C  Reason for RD Visit:   gastroparesis     Nutrition Assessment:  Patient is a 43 year old female with history that includes gastroparesis and noted very abnormal delay per GES. She c/o ongoing issues with chronic abdominal pain. Hx gallbladder sludge, gallstone and hx cholecystectomy and hysterectomy. She stated that she has stomach pain all of the time which can be worse with/after eating. She typically eats 3 meals per day and doesn't snack. She drinks a lot of fluids,  oz water per day and has 2% lactose free Fairlife milk with breakfast and dinner and then diet pop with lunch. She eats something lighter at breakfast, has a protein drink and banana for lunch and then dinner is her bigger meal of the day with the family. She has been walking for 30 minutes at night after dinner which is helpful especially since dinner is her bigger meal of the day. She reported having variable stools. She stated that she was prescribed medication (reglan) for her gastroparesis but first wanted to have diet education and try some dietary changes to see if helpful d/t concern for some possible side effects of the medication. She has not previously had diet education for gastroparesis so interested in diet education at visit today.    Height:   Ht Readings from Last 1 Encounters:   04/12/24 1.638 m (5' 4.49\")     Weight: she denied having any/any significant weight loss.   Wt Readings from Last 10 Encounters:   09/11/24 117.9 kg (260 lb)   04/12/24 122 kg (269 lb)   04/04/24 122 kg (269 lb)   03/02/23 119.7 kg (264 lb)   11/02/22 115.8 kg (255 lb 6.4 oz)   04/06/22 114.3 kg (252 lb) " "  02/28/22 116.7 kg (257 lb 4 oz)   10/26/21 108.9 kg (240 lb)   07/05/21 113.5 kg (250 lb 5 oz)   03/18/21 114.1 kg (251 lb 9.6 oz)     BMI: Estimated body mass index is 43.96 kg/m  as calculated from the following:    Height as of 4/12/24: 1.638 m (5' 4.49\").    Weight as of this encounter: 117.9 kg (260 lb).    Diet Recall:  (some usual/recent meals/snacks/beverages):  Meal Food    Breakfast Banana bread   Lunch Protein shake and banana   Dinner Chicken stroganoff and pasta or eggs and pancake   Snacks Not usually    Beverages Fairlife 2% Milk with B and dinner, 1 c coffee with fr van creamer,  oz water, 1 can mtn dew zero with lunch   Alcohol Intake Very occas 1 wine with seltzer every 3-4 months.     Frequency of eating/taking out meals: on Fridays: door dash     Labs:    Last Comprehensive Metabolic Panel:  Sodium   Date Value Ref Range Status   04/09/2021 139 133 - 144 mmol/L Final     Potassium   Date Value Ref Range Status   04/09/2021 4.5 3.4 - 5.3 mmol/L Final     Chloride   Date Value Ref Range Status   04/09/2021 108 94 - 109 mmol/L Final     Carbon Dioxide   Date Value Ref Range Status   04/09/2021 28 20 - 32 mmol/L Final     Anion Gap   Date Value Ref Range Status   04/09/2021 3 3 - 14 mmol/L Final     Glucose   Date Value Ref Range Status   04/04/2024 97 70 - 99 mg/dL Final   03/02/2023 98 70 - 99 mg/dL Final   04/09/2021 93 70 - 99 mg/dL Final     Comment:     Fasting specimen     Urea Nitrogen   Date Value Ref Range Status   04/09/2021 14 7 - 30 mg/dL Final     Creatinine   Date Value Ref Range Status   04/09/2021 0.70 0.52 - 1.04 mg/dL Final     GFR Estimate   Date Value Ref Range Status   04/09/2021 >90 >60 mL/min/[1.73_m2] Final     Comment:     Non  GFR Calc  Starting 12/18/2018, serum creatinine based estimated GFR (eGFR) will be   calculated using the Chronic Kidney Disease Epidemiology Collaboration   (CKD-EPI) equation.       Calcium   Date Value Ref Range Status "   04/09/2021 8.8 8.5 - 10.1 mg/dL Final     CBC RESULTS:   Recent Labs   Lab Test 12/13/19  1056   WBC 10.1   RBC 5.03   HGB 14.7   HCT 45.4   MCV 90   MCH 29.2   MCHC 32.4   RDW 12.1          Pertinent Medications/vitamin and mineral supplements:      Current Outpatient Medications   Medication Sig Dispense Refill    levothyroxine (SYNTHROID/LEVOTHROID) 25 MCG tablet Take 1 tablet (25 mcg) by mouth daily 90 tablet 3    metoclopramide (REGLAN) 5 MG tablet Take 1 tablet (5 mg) by mouth 2 times daily as needed (abdominal pain). 30 tablet 0    omeprazole (PRILOSEC) 20 MG DR capsule Take 1 capsule (20 mg) by mouth daily 90 capsule 3     No current facility-administered medications for this visit.     Food Allergies:  NKFA   Physical Activity:  walking for 30 minutes after dinner.    MALNUTRITION:  % Weight Loss:  None noted  % Intake:  No decreased intake noted  Subcutaneous Fat Loss:  None observed  Muscle Loss:  None observed  Fluid Retention:  None noted    Malnutrition Diagnosis: Patient does not meet two of the above criteria necessary for diagnosing malnutrition  In Context of:  Chronic illness or disease    Nutrition Prescription: Nutrition Education     Nutrition Intervention      Provided diet education for gastroparesis, abdominal pain.    Answered patient's questions. Patient verbalized understanding of education provided. See Goals below.     Educational Materials Provided:  Nutrition care manual: gastroparesis nutrition therapy    Goals:    1. Eat 4-6 smaller meals per day that are lower in fat and lower in fiber.    --based on your usual meal pattern now, you could add in a planned snack/small meal or plan to have a second protein drink or half serving between lunch and dinner to help prevent overeating at dinner meal (this would give you 4 smaller meals per day).    For low fiber diet:  -Avoid raw vegetables and cook them very well before eating.  -Avoid corn, peas and beans such as black beans,  kidney beans, navy beans (even if cooked still high in fiber).  -Canned fruit in its own juice is lower in fiber, as well as ripe bananas, ripe melon, unsweetened applesauce.  -Avoid whole grain breads/crackers (choose white bread, low fiber crackers, white rice).  -Avoid whole nuts and seeds.  -Okay for small amounts of creamy nut butters. Avoid chunky nut butters.    For low fat diet:  -Choose lean proteins/lean cuts of meats. Remove skins from chicken and turkey breast, fish (not breaded and not fried), lean cuts of pork (tenderloin, lean pork chop, limit beef meat and if having choose lean cut or 93% lean ground beef. Can substitute with ground turkey or ground chicken for beef.  -Removed fat from meats before cooking.  -Use lower fat cooking methods such as baking, broiling, grilling.   -Limit using a lot of fats/oil/high fat sauces/dressing/butter/gravies when preparing foods and limit the amount used on foods.  -Choose skim or 1% based dairy products (such as lowfat yogurts, skim/1% milk, non fat/1% cottage cheese, lowfat pudding).  -Do not eat large amounts of nut butters, avocado.    2. Can have protein drink as a small meal/snack if better tolerated. These drinks count towards those 4-6 smaller meals.    --you could have a second protein drink each day for example as an afternoon snack/small meal and then eat a smaller amount at dinner as this may feel more comfortable and prevent overeating at dinner meal.    -some protein drink examples include using a protein powder of your choice (such as your Obvi protein powder) and mixing with water/skim or lowfat milk lactose free milk or lowfat milk substitute, or Premier protein, Ensure max, Ensure high protein, Boost high protein.    3. Chew food very well before swallowing.    4. Limit fluids with meals (only take small sips as needed) and drink the rest of your fluids in between meals.    --Limit carbonated beverages such as pop and carbonated water) as this  may cause some bloating, stomach pain.     5. Continue to drink at least 8 cups (64 oz) or more water per day (drink mostly in between meals).    6. Continue daily walking as this may help relieve some stomach discomfort and help with some constipation.    Nutrition Monitoring and Evaluation: Will monitor adherence to nutrition recommendations at future RD visits.     Further Medical Nutrition Therapy:  Follow-up scheduled 10/23/24    Patient was encouraged to contact RD with any further questions.    Jennifer Haley MS, RD, LD

## 2024-10-14 ENCOUNTER — MYC MEDICAL ADVICE (OUTPATIENT)
Dept: FAMILY MEDICINE | Facility: CLINIC | Age: 44
End: 2024-10-14
Payer: COMMERCIAL

## 2024-10-14 DIAGNOSIS — Z12.31 ENCOUNTER FOR SCREENING MAMMOGRAM FOR BREAST CANCER: Primary | ICD-10-CM

## 2024-10-15 ENCOUNTER — LAB (OUTPATIENT)
Dept: LAB | Facility: CLINIC | Age: 44
End: 2024-10-15
Payer: COMMERCIAL

## 2024-10-15 DIAGNOSIS — R19.5 ELEVATED FECAL CALPROTECTIN: ICD-10-CM

## 2024-10-16 PROCEDURE — 83993 ASSAY FOR CALPROTECTIN FECAL: CPT

## 2024-10-18 LAB — CALPROTECTIN STL-MCNT: 101 MG/KG (ref 0–49.9)

## 2024-10-22 ENCOUNTER — MYC MEDICAL ADVICE (OUTPATIENT)
Dept: GASTROENTEROLOGY | Facility: CLINIC | Age: 44
End: 2024-10-22
Payer: COMMERCIAL

## 2024-10-22 ENCOUNTER — TELEPHONE (OUTPATIENT)
Dept: GASTROENTEROLOGY | Facility: CLINIC | Age: 44
End: 2024-10-22
Payer: COMMERCIAL

## 2024-10-22 DIAGNOSIS — R19.5 ELEVATED FECAL CALPROTECTIN: Primary | ICD-10-CM

## 2024-10-22 NOTE — TELEPHONE ENCOUNTER
"Endoscopy Scheduling Screen    Have you had any respiratory illness or flu-like symptoms in the last 10 days?  No    What is your communication preference for Instructions and/or Bowel Prep?   MyChart    What insurance is in the chart?  Other:  [facility]    Ordering/Referring Provider: Giovanni Solano PA-C   (If ordering provider performs procedure, schedule with ordering provider unless otherwise instructed. )    BMI: Estimated body mass index is 43.96 kg/m  as calculated from the following:    Height as of 4/12/24: 1.638 m (5' 4.49\").    Weight as of 9/11/24: 117.9 kg (260 lb).     Sedation Ordered  moderate sedation.   If patient BMI > 50 do not schedule in [facility].    If patient BMI > 45 do not schedule at [facility].    Are you taking methadone or Suboxone?  NO, No RN review required.    Have you been diagnosed and are being treated for severe PTSD or severe anxiety?  NO, No RN review required.    Are you taking any prescription medications for pain 3 or more times per week?   NO, No RN review required.    Do you have a history of malignant hyperthermia?  No    (Females) Are you currently pregnant?   No     Have you been diagnosed or told you have pulmonary hypertension?   No    Do you have an LVAD?  No    Have you been told you have moderate to severe sleep apnea?  No.    Have you been told you have COPD, asthma, or any other lung disease?  No    Do you have any heart conditions?  No     Have you ever had or are you waiting for an organ transplant?  No. Continue scheduling, no site restrictions.    Have you had a stroke or transient ischemic attack (TIA aka \"mini stroke\" in the last 6 months?   No    Have you been diagnosed with or been told you have cirrhosis of the liver?   No.    Are you currently on dialysis?   No    Do you need assistance transferring?   No    BMI: Estimated body mass index is 43.96 kg/m  as calculated from the following:    Height as of 4/12/24: 1.638 m (5' 4.49\").    Weight as of 9/11/24: 117.9 kg " (260 lb).     Is patients BMI > 40 and scheduling location [facility]?  No    Do you take an injectable or oral medication for weight loss or diabetes (excluding insulin)?  No    Do you take the medication Naltrexone?  No    Do you take blood thinners?  No       Prep   Are you currently on dialysis or do you have chronic kidney disease?  No    Do you have a diagnosis of diabetes?  No    Do you have a diagnosis of cystic fibrosis (CF)?  No    On a regular basis do you go 3 -5 days between bowel movements?  No    BMI > 40?  Yes (Extended Prep)    Preferred Pharmacy:        [id number] - [place] - [address] [facility] [address] [place] [address]  Phone: [phone] Fax: [phone]      Final Scheduling Details     Procedure scheduled  Colonoscopy    Surgeon:  Tray     Date of procedure:  11/07/2024     Pre-OP / PAC:   No - Not required for this site.    Location  [facility] - [facility] - Patient preference.    Sedation   Moderate Sedation - Per order.      Patient Reminders:   You will receive a call from a Nurse to review instructions and health history.  This assessment must be completed prior to your procedure.  Failure to complete the Nurse assessment may result in the procedure being cancelled.      On the day of your procedure, please designate an adult(s) who can drive you home stay with you for the next 24 hours. The medicines used in the exam will make you sleepy. You will not be able to drive.      You cannot take public transportation, ride share services, or non-medical taxi service without a responsible caregiver.  Medical transport services are allowed with the requirement that a responsible caregiver will receive you at your destination.  We require that drivers and caregivers are confirmed prior to your procedure.

## 2024-10-22 NOTE — RESULT ENCOUNTER NOTE
Paul Rodriguez,    Your repeat fecal calprotectin level has again come back elevated, slightly more than previous.  At this point, I would recommend a colonoscopy to definitively evaluate for small bowel and colonic inflammation.  If you are okay with this, let me know and I will arrange it.    Please let me know if you have any questions.    Sincerely,  Giovanni DIEHL [facility] GI, Hepatology, and Nutrition

## 2024-10-23 ENCOUNTER — VIRTUAL VISIT (OUTPATIENT)
Dept: GASTROENTEROLOGY | Facility: CLINIC | Age: 44
End: 2024-10-23
Payer: COMMERCIAL

## 2024-10-23 DIAGNOSIS — K31.84 GASTROPARESIS: Primary | ICD-10-CM

## 2024-10-23 PROCEDURE — 97803 MED NUTRITION INDIV SUBSEQ: CPT | Mod: 95 | Performed by: DIETITIAN, REGISTERED

## 2024-10-23 PROCEDURE — 99207 PR NO CHARGE LOS: CPT | Mod: 95 | Performed by: DIETITIAN, REGISTERED

## 2024-10-23 NOTE — PROGRESS NOTES
Virtual Visit Details    Type of service:  Video Visit     Originating Location (pt. Location): Home  Distant Location (provider location):  Off-site  Platform used for Video Visit: [facility] Outpatient Medical Nutrition Therapy      Time Spent:  13 minutes  Session Type:  follow up  Referring Physician:  Giovanni Solano PA-C  Reason for RD Visit:   gastroparesis     Nutrition Assessment:  Patient is a 44 year old female with history that includes gastroparesis and noted very abnormal delay per GES.     At initial visit, she c/o ongoing issues with chronic abdominal pain. Hx gallbladder sludge, gallstone and hx cholecystectomy and hysterectomy. She stated that she has stomach pain all of the time which can be worse with/after eating. She typically eats 3 meals per day and doesn't snack. She drinks a lot of fluids,  oz water per day and has 2% lactose free Fairlife milk with breakfast and dinner and then diet pop with lunch. She eats something lighter at breakfast, has a protein drink and banana for lunch and then dinner is her bigger meal of the day with the family. She has been walking for 30 minutes at night after dinner which is helpful especially since dinner is her bigger meal of the day. She reported having variable stools. She stated that she was prescribed medication (reglan) for her gastroparesis but first wanted to have diet education and try some dietary changes to see if helpful d/t concern for some possible side effects of the medication. She has not previously had diet education for gastroparesis so interested in diet education at visit today.    At follow up visit on 10/23/24, she stated that she has been following the gastroparesis diet guidelines and has been feeling better. She isn't having regular abdominal pain now, just once in awhile. Her reflux is much better with following the GP diet as well. She eats dinner at 5 PM and goes to bed at 9 PM, so has several hours after  "eating before going to bed. She is walking more and continue to walk daily after dinner, on days off walks twice per day and eating lower in fiber and lower in fat diet. She has 3 meals per day and some saltines or small individual bag of pretzels at work for a snack. She replaces her lunch meal with a protein drink and banana. She hasn't started reglan. Last visit, she wanted to try dietary changes to see if helpful d/t concerns for possible side effects of the medication. She will have a colonoscopy on 11/7/24. Overall, she has had improvement in symptoms and feeing better.    Height:   Ht Readings from Last 1 Encounters:   04/12/24 1.638 m (5' 4.49\")     Weight: She feels like she has lost a little weight but hasn't weighed herself. Pants fit a little differently.  Wt Readings from Last 10 Encounters:   09/11/24 117.9 kg (260 lb)   04/12/24 122 kg (269 lb)   04/04/24 122 kg (269 lb)   03/02/23 119.7 kg (264 lb)   11/02/22 115.8 kg (255 lb 6.4 oz)   04/06/22 114.3 kg (252 lb)   02/28/22 116.7 kg (257 lb 4 oz)   10/26/21 108.9 kg (240 lb)   07/05/21 113.5 kg (250 lb 5 oz)   03/18/21 114.1 kg (251 lb 9.6 oz)     BMI: Estimated body mass index is 43.96 kg/m  as calculated from the following:    Height as of 4/12/24: 1.638 m (5' 4.49\").    Weight as of 9/11/24: 117.9 kg (260 lb).    Diet Recall:  (some usual/recent meals/snacks/beverages):  Meal Food    Breakfast Corn flakes or eng muffin with little PB and butter   Lunch Obvi Protein shake and banana, sometimes yogurt too.   Dinner Chicken crock pot, pasta/mashed potato or protein, starch, sometimes has some veggies/carrots (cooked). She likes green beans, beets and asparagus but doesn't have them each day.   Snacks Small bag pretzels/saltine   Beverages Fairlife skim/1% Milk with B and dinner, 1 c coffee with fr van creamer,  oz water, 1 can mtn dew zero with lunch   Alcohol Intake Very occas 1 wine with seltzer every 3-4 months.     Frequency of eating/taking " out meals: on Fridays: door dash     Labs:    Last Comprehensive Metabolic Panel:  Sodium   Date Value Ref Range Status   04/09/2021 139 133 - 144 mmol/L Final     Potassium   Date Value Ref Range Status   04/09/2021 4.5 3.4 - 5.3 mmol/L Final     Chloride   Date Value Ref Range Status   04/09/2021 108 94 - 109 mmol/L Final     Carbon Dioxide   Date Value Ref Range Status   04/09/2021 28 20 - 32 mmol/L Final     Anion Gap   Date Value Ref Range Status   04/09/2021 3 3 - 14 mmol/L Final     Glucose   Date Value Ref Range Status   04/04/2024 97 70 - 99 mg/dL Final   03/02/2023 98 70 - 99 mg/dL Final   04/09/2021 93 70 - 99 mg/dL Final     Comment:     Fasting specimen     Urea Nitrogen   Date Value Ref Range Status   04/09/2021 14 7 - 30 mg/dL Final     Creatinine   Date Value Ref Range Status   04/09/2021 0.70 0.52 - 1.04 mg/dL Final     GFR Estimate   Date Value Ref Range Status   04/09/2021 >90 >60 mL/min/[1.73_m2] Final     Comment:     Non  GFR Calc  Starting 12/18/2018, serum creatinine based estimated GFR (eGFR) will be   calculated using the Chronic Kidney Disease Epidemiology Collaboration   (CKD-EPI) equation.       Calcium   Date Value Ref Range Status   04/09/2021 8.8 8.5 - 10.1 mg/dL Final     CBC RESULTS:   Recent Labs   Lab Test 12/13/19  1056   WBC 10.1   RBC 5.03   HGB 14.7   HCT 45.4   MCV 90   MCH 29.2   MCHC 32.4   RDW 12.1          Pertinent Medications/vitamin and mineral supplements:      Current Outpatient Medications   Medication Sig Dispense Refill    levothyroxine (SYNTHROID/LEVOTHROID) 25 MCG tablet Take 1 tablet (25 mcg) by mouth daily 90 tablet 3    metoclopramide (REGLAN) 5 MG tablet Take 1 tablet (5 mg) by mouth 2 times daily as needed (abdominal pain). 30 tablet 0    omeprazole (PRILOSEC) 20 MG DR capsule Take 1 capsule (20 mg) by mouth daily 90 capsule 3     No current facility-administered medications for this visit.     Food Allergies:  NKFA   Physical  Activity:  walking for 30 minutes after dinner. Walks a lot at work too during the day. If not working, then she will also take a walk mid-morning and then again after dinner.    MALNUTRITION:  % Weight Loss:  None noted  % Intake:  No decreased intake noted  Subcutaneous Fat Loss:  None observed  Muscle Loss:  None observed  Fluid Retention:  None noted    Malnutrition Diagnosis: Patient does not meet two of the above criteria necessary for diagnosing malnutrition  In Context of:  Chronic illness or disease    Nutrition Prescription: Nutrition Education     Nutrition Intervention      Provided diet education review for gastroparesis.    Answered patient's questions. Patient verbalized understanding of education provided. See Goals below.     Goals:    1. Continue following gastroparesis diet which includes eating 4-6 smaller meals per day that are lower in fat and lower in fiber. (Okay to continue having 3 smaller meals and a planned snack and okay for protein drink as a meal replacement), continue to limit drinking fluids with meals, chew food well before swallowing as well.    For low fiber diet:  -Avoid raw vegetables and cook them very well before eating.  -Avoid corn, peas and beans such as black beans, kidney beans, navy beans (even if cooked still high in fiber).  -Canned fruit in its own juice is lower in fiber, as well as ripe bananas, ripe melon, unsweetened applesauce.  -Avoid whole grain breads/crackers (choose white bread, low fiber crackers, white rice).  -Avoid whole nuts and seeds.  -Okay for small amounts of creamy nut butters. Avoid chunky nut butters.    For low fat diet:  -Choose lean proteins/lean cuts of meats. Remove skins from chicken and turkey breast, fish (not breaded and not fried), lean cuts of pork (tenderloin, lean pork chop, limit beef meat and if having choose lean cut or 93% lean ground beef. Can substitute with ground turkey or ground chicken for beef.  -Removed fat from meats  "before cooking.  -Use lower fat cooking methods such as baking, broiling, grilling.   -Limit using a lot of fats/oil/high fat sauces/dressing/butter/gravies when preparing foods and limit the amount used on foods.  -Choose skim or 1% based dairy products (such as lowfat yogurts, skim/1% milk, non fat/1% cottage cheese, lowfat pudding).  -Do not eat large amounts of nut butters, avocado.    2. Aim for including a vegetable and/or fruit serving with each meal. For example at breakfast, you could add some banana into your cereal or canned peaches/canned pears (either in cereal or on the side). At dinner consistently include a cooked vegetables each dinner meal.    3. Continue to replace lunch meal with your Obvi protein drink and banana (or can have other lower fiber fruit such as ripe melon, canned peaches, canned pears).    4. Continue to drink at least 8 cups (64 oz) or more water per day (drink mostly in between meals).    5. Continue daily walking, 1-2 times per day as you are currently doing.    6. Here is the name of a book that has some other tips and low fiber, low fat recipes for gastroparesis. \"Living Well with Gastroparesis\" by Autumn Lara.    Nutrition Monitoring and Evaluation: Will monitor adherence to nutrition recommendations at future RD visits.     Further Medical Nutrition Therapy:  Follow-up prn    Patient was encouraged to contact RD with any further questions.    Jennifer Haley, MS, RD, LD      "

## 2024-10-23 NOTE — LETTER
10/23/2024      Jennifer Valentin  [address] [address] [id number]      Dear Colleague,    Thank you for referring your patient, Jeninfer Valentin, to the [facility]STRO[facility] [place]. Please see a copy of my visit note below.    Virtual Visit Details    Type of service:  Video Visit     Originating Location (pt. Location): Home  Distant Location (provider location):  Off-site  Platform used for Video Visit: [facility] Outpatient Medical Nutrition Therapy      Time Spent:  13 minutes  Session Type:  follow up  Referring Physician:  Giovanni Solano PA-C  Reason for RD Visit:   gastroparesis     Nutrition Assessment:  Patient is a 44 year old female with history that includes gastroparesis and noted very abnormal delay per GES.     At initial visit, she c/o ongoing issues with chronic abdominal pain. Hx gallbladder sludge, gallstone and hx cholecystectomy and hysterectomy. She stated that she has stomach pain all of the time which can be worse with/after eating. She typically eats 3 meals per day and doesn't snack. She drinks a lot of fluids,  oz water per day and has 2% lactose free Fairlife milk with breakfast and dinner and then diet pop with lunch. She eats something lighter at breakfast, has a protein drink and banana for lunch and then dinner is her bigger meal of the day with the family. She has been walking for 30 minutes at night after dinner which is helpful especially since dinner is her bigger meal of the day. She reported having variable stools. She stated that she was prescribed medication (reglan) for her gastroparesis but first wanted to have diet education and try some dietary changes to see if helpful d/t concern for some possible side effects of the medication. She has not previously had diet education for gastroparesis so interested in diet education at visit today.    At follow up visit on 10/23/24, she stated that she has been  "following the gastroparesis diet guidelines and has been feeling better. She isn't having regular abdominal pain now, just once in awhile. Her reflux is much better with following the GP diet as well. She eats dinner at 5 PM and goes to bed at 9 PM, so has several hours after eating before going to bed. She is walking more and continue to walk daily after dinner, on days off walks twice per day and eating lower in fiber and lower in fat diet. She has 3 meals per day and some saltines or small individual bag of pretzels at work for a snack. She replaces her lunch meal with a protein drink and banana. She hasn't started reglan. Last visit, she wanted to try dietary changes to see if helpful d/t concerns for possible side effects of the medication. She will have a colonoscopy on 11/7/24. Overall, she has had improvement in symptoms and feeing better.    Height:   Ht Readings from Last 1 Encounters:   04/12/24 1.638 m (5' 4.49\")     Weight: She feels like she has lost a little weight but hasn't weighed herself. Pants fit a little differently.  Wt Readings from Last 10 Encounters:   09/11/24 117.9 kg (260 lb)   04/12/24 122 kg (269 lb)   04/04/24 122 kg (269 lb)   03/02/23 119.7 kg (264 lb)   11/02/22 115.8 kg (255 lb 6.4 oz)   04/06/22 114.3 kg (252 lb)   02/28/22 116.7 kg (257 lb 4 oz)   10/26/21 108.9 kg (240 lb)   07/05/21 113.5 kg (250 lb 5 oz)   03/18/21 114.1 kg (251 lb 9.6 oz)     BMI: Estimated body mass index is 43.96 kg/m  as calculated from the following:    Height as of 4/12/24: 1.638 m (5' 4.49\").    Weight as of 9/11/24: 117.9 kg (260 lb).    Diet Recall:  (some usual/recent meals/snacks/beverages):  Meal Food    Breakfast Corn flakes or eng muffin with little PB and butter   Lunch Obvi Protein shake and banana, sometimes yogurt too.   Dinner Chicken crock pot, pasta/mashed potato or protein, starch, sometimes has some veggies/carrots (cooked). She likes green beans, beets and asparagus but doesn't have " them each day.   Snacks Small bag pretzels/saltine   Beverages Fairlife skim/1% Milk with B and dinner, 1 c coffee with fr van creamer,  oz water, 1 can mtn dew zero with lunch   Alcohol Intake Very occas 1 wine with seltzer every 3-4 months.     Frequency of eating/taking out meals: on Fridays: door dash     Labs:    Last Comprehensive Metabolic Panel:  Sodium   Date Value Ref Range Status   04/09/2021 139 133 - 144 mmol/L Final     Potassium   Date Value Ref Range Status   04/09/2021 4.5 3.4 - 5.3 mmol/L Final     Chloride   Date Value Ref Range Status   04/09/2021 108 94 - 109 mmol/L Final     Carbon Dioxide   Date Value Ref Range Status   04/09/2021 28 20 - 32 mmol/L Final     Anion Gap   Date Value Ref Range Status   04/09/2021 3 3 - 14 mmol/L Final     Glucose   Date Value Ref Range Status   04/04/2024 97 70 - 99 mg/dL Final   03/02/2023 98 70 - 99 mg/dL Final   04/09/2021 93 70 - 99 mg/dL Final     Comment:     Fasting specimen     Urea Nitrogen   Date Value Ref Range Status   04/09/2021 14 7 - 30 mg/dL Final     Creatinine   Date Value Ref Range Status   04/09/2021 0.70 0.52 - 1.04 mg/dL Final     GFR Estimate   Date Value Ref Range Status   04/09/2021 >90 >60 mL/min/[1.73_m2] Final     Comment:     Non  GFR Calc  Starting 12/18/2018, serum creatinine based estimated GFR (eGFR) will be   calculated using the Chronic Kidney Disease Epidemiology Collaboration   (CKD-EPI) equation.       Calcium   Date Value Ref Range Status   04/09/2021 8.8 8.5 - 10.1 mg/dL Final     CBC RESULTS:   Recent Labs   Lab Test 12/13/19  1056   WBC 10.1   RBC 5.03   HGB 14.7   HCT 45.4   MCV 90   MCH 29.2   MCHC 32.4   RDW 12.1          Pertinent Medications/vitamin and mineral supplements:      Current Outpatient Medications   Medication Sig Dispense Refill     levothyroxine (SYNTHROID/LEVOTHROID) 25 MCG tablet Take 1 tablet (25 mcg) by mouth daily 90 tablet 3     metoclopramide (REGLAN) 5 MG tablet Take  1 tablet (5 mg) by mouth 2 times daily as needed (abdominal pain). 30 tablet 0     omeprazole (PRILOSEC) 20 MG DR capsule Take 1 capsule (20 mg) by mouth daily 90 capsule 3     No current facility-administered medications for this visit.     Food Allergies:  NKFA   Physical Activity:  walking for 30 minutes after dinner. Walks a lot at work too during the day. If not working, then she will also take a walk mid-morning and then again after dinner.    MALNUTRITION:  % Weight Loss:  None noted  % Intake:  No decreased intake noted  Subcutaneous Fat Loss:  None observed  Muscle Loss:  None observed  Fluid Retention:  None noted    Malnutrition Diagnosis: Patient does not meet two of the above criteria necessary for diagnosing malnutrition  In Context of:  Chronic illness or disease    Nutrition Prescription: Nutrition Education     Nutrition Intervention      Provided diet education review for gastroparesis.    Answered patient's questions. Patient verbalized understanding of education provided. See Goals below.     Goals:    1. Continue following gastroparesis diet which includes eating 4-6 smaller meals per day that are lower in fat and lower in fiber. (Okay to continue having 3 smaller meals and a planned snack and okay for protein drink as a meal replacement), continue to limit drinking fluids with meals, chew food well before swallowing as well.    For low fiber diet:  -Avoid raw vegetables and cook them very well before eating.  -Avoid corn, peas and beans such as black beans, kidney beans, navy beans (even if cooked still high in fiber).  -Canned fruit in its own juice is lower in fiber, as well as ripe bananas, ripe melon, unsweetened applesauce.  -Avoid whole grain breads/crackers (choose white bread, low fiber crackers, white rice).  -Avoid whole nuts and seeds.  -Okay for small amounts of creamy nut butters. Avoid chunky nut butters.    For low fat diet:  -Choose lean proteins/lean cuts of meats. Remove skins  "from chicken and turkey breast, fish (not breaded and not fried), lean cuts of pork (tenderloin, lean pork chop, limit beef meat and if having choose lean cut or 93% lean ground beef. Can substitute with ground turkey or ground chicken for beef.  -Removed fat from meats before cooking.  -Use lower fat cooking methods such as baking, broiling, grilling.   -Limit using a lot of fats/oil/high fat sauces/dressing/butter/gravies when preparing foods and limit the amount used on foods.  -Choose skim or 1% based dairy products (such as lowfat yogurts, skim/1% milk, non fat/1% cottage cheese, lowfat pudding).  -Do not eat large amounts of nut butters, avocado.    2. Aim for including a vegetable and/or fruit serving with each meal. For example at breakfast, you could add some banana into your cereal or canned peaches/canned pears (either in cereal or on the side). At dinner consistently include a cooked vegetables each dinner meal.    3. Continue to replace lunch meal with your Obvi protein drink and banana (or can have other lower fiber fruit such as ripe melon, canned peaches, canned pears).    4. Continue to drink at least 8 cups (64 oz) or more water per day (drink mostly in between meals).    5. Continue daily walking, 1-2 times per day as you are currently doing.    6. Here is the name of a book that has some other tips and low fiber, low fat recipes for gastroparesis. \"Living Well with Gastroparesis\" by Autumn Lara.    Nutrition Monitoring and Evaluation: Will monitor adherence to nutrition recommendations at future RD visits.     Further Medical Nutrition Therapy:  Follow-up prn    Patient was encouraged to contact RD with any further questions.    Jennifer Haley, MS, RD, LD        Again, thank you for allowing me to participate in the care of your patient.        Sincerely,        Jennifer Haley, JACEK  "

## 2024-10-23 NOTE — PATIENT INSTRUCTIONS
It was nice speaking with you today. Below are the nutrition recommendations we discussed at your visit.    Please let me know if you have any additional questions.    Nutrition Recommendations    1. Continue following gastroparesis diet which includes eating 4-6 smaller meals per day that are lower in fat and lower in fiber. (Okay to continue having 3 smaller meals and a planned snack and okay for protein drink as a meal replacement), continue to limit drinking fluids with meals, chew food well before swallowing as well.    For low fiber diet:  -Avoid raw vegetables and cook them very well before eating.  -Avoid corn, peas and beans such as black beans, kidney beans, navy beans (even if cooked still high in fiber).  -Canned fruit in its own juice is lower in fiber, as well as ripe bananas, ripe melon, unsweetened applesauce.  -Avoid whole grain breads/crackers (choose white bread, low fiber crackers, white rice).  -Avoid whole nuts and seeds.  -Okay for small amounts of creamy nut butters. Avoid chunky nut butters.    For low fat diet:  -Choose lean proteins/lean cuts of meats. Remove skins from chicken and turkey breast, fish (not breaded and not fried), lean cuts of pork (tenderloin, lean pork chop, limit beef meat and if having choose lean cut or 93% lean ground beef. Can substitute with ground turkey or ground chicken for beef.  -Removed fat from meats before cooking.  -Use lower fat cooking methods such as baking, broiling, grilling.   -Limit using a lot of fats/oil/high fat sauces/dressing/butter/gravies when preparing foods and limit the amount used on foods.  -Choose skim or 1% based dairy products (such as lowfat yogurts, skim/1% milk, non fat/1% cottage cheese, lowfat pudding).  -Do not eat large amounts of nut butters, avocado.    2. Aim for including a vegetable and/or fruit serving with each meal. For example at breakfast, you could add some banana into your cereal or canned peaches/canned pears  "(either in cereal or on the side). At dinner consistently include a cooked vegetables each dinner meal.    3. Continue to replace lunch meal with your Obvi protein drink and banana (or can have other lower fiber fruit such as ripe melon, canned peaches, canned pears).    4. Continue to drink at least 8 cups (64 oz) or more water per day (drink mostly in between meals).    5. Continue daily walking, 1-2 times per day as you are currently doing.    6. Here is the name of a book that has some other tips and low fiber, low fat recipes for gastroparesis. \"Living Well with Gastroparesis\" by Autumn Lara.  Can follow up as needed.    If you would like to schedule a follow up appointment, please call [phone].    Thank you,    Jennifer Haley, MS, RD, LD    "

## 2024-10-23 NOTE — NURSING NOTE
Current patient location: [address] [place] [id number]    Is the patient currently in the state of [place]? YES    Visit mode:VIDEO    If the visit is dropped, the patient can be reconnected by: VIDEO VISIT: Text to cell phone:   Telephone Information:   Mobile [phone]       Will anyone else be joining the visit? No  (If patient encounters technical issues they should call [phone]    Are changes needed to the allergy or medication list? No    Are refills needed on medications prescribed by this physician? NO    Rooming Documentation:  Not applicable    Reason for visit: RECHECK    Cha GALLARDO

## 2024-10-25 ENCOUNTER — TELEPHONE (OUTPATIENT)
Dept: GASTROENTEROLOGY | Facility: CLINIC | Age: 44
End: 2024-10-25
Payer: COMMERCIAL

## 2024-10-25 DIAGNOSIS — R19.5 ELEVATED FECAL CALPROTECTIN: Primary | ICD-10-CM

## 2024-10-25 RX ORDER — BISACODYL 5 MG/1
TABLET, DELAYED RELEASE ORAL
Qty: 4 TABLET | Refills: 0 | Status: SHIPPED | OUTPATIENT
Start: 2024-10-25

## 2024-10-25 NOTE — TELEPHONE ENCOUNTER
Pre visit planning completed.      Procedure details:    Patient scheduled for Colonoscopy on 11/7/24.     Arrival time: 1305. Procedure time 1350    Facility location: [facility]; [address] Floor, [place] [address]. Check in location: 2nd Floor at Surgery desk.    Sedation type: Conscious sedation     Pre op exam needed? No.    Indication for procedure: Elevated fecal calprotectin       Chart review:     Electronic implanted devices? No    Recent diagnosis of diverticulitis within the last 6 weeks? No.         Medication review:    Diabetic? No    Anticoagulants? No    Weight loss medication/injectable? No GLP-1 medication per patient's medication list.  RN will verify with pre-assessment call.    Other medication HOLDING recommendations:  N/A      Prep for procedure:     Bowel prep recommendation: Extended Golytely. Bowel prep prescription sent 10/25/24 to    [id number] - [place] [id number] [facility] [address]   Due to: BMI > 40.     Prep instructions sent via [employer]         Cecilia Briceño RN  Endoscopy Procedure Pre Assessment RN  [phone] option 2

## 2024-10-25 NOTE — TELEPHONE ENCOUNTER
Pre assessment completed for upcoming procedure.   (Please see previous telephone encounter notes for complete details)    Patient  returned call.       Procedure details:    Arrival time and facility location reviewed.    Pre op exam needed? No.    Designated  policy reviewed. Instructed to have someone stay 6  hours post procedure.       Medication review:    Medications reviewed. Please see supporting documentation below. Holding recommendations discussed (if applicable).       Prep for procedure:     Procedure prep instructions reviewed.        Any additional information needed:  N/A      Patient  verbalized understanding and had no questions or concerns at this time.      Cecilia Hayes RN  Endoscopy Procedure Pre Assessment   [phone] option 2

## 2024-10-25 NOTE — TELEPHONE ENCOUNTER
Attempted to contact patient in order to complete pre assessment questions.     No answer. Left message to return call to [phone] option 2    Callback required communication sent via [employer].      Cecilia Briceño RN  Endoscopy Procedure Pre Assessment

## 2024-11-06 NOTE — TELEPHONE ENCOUNTER
Incoming call from patient.     Patient states they started to drink the first 1/2 of Golytely solution last night and has been having loose stools even this morning. They are wanting to know if they really need to be drinking the other 1/2 of Golytely tonight and then again 1/2 Golytely container 6 hours prior to procedure. Informed patient that they should follow instructions for extended Golytely prep as recommended. Patient verbalized understanding and had no further questions at this time.     Cecilia Briceño RN  Endoscopy Procedure Pre Assessment   [phone] option 2

## 2024-11-07 ENCOUNTER — HOSPITAL ENCOUNTER (OUTPATIENT)
Facility: AMBULATORY SURGERY CENTER | Age: 44
Discharge: HOME | End: 2024-11-07
Attending: INTERNAL MEDICINE | Admitting: INTERNAL MEDICINE
Payer: COMMERCIAL

## 2024-11-07 VITALS
HEART RATE: 69 BPM | OXYGEN SATURATION: 96 % | DIASTOLIC BLOOD PRESSURE: 81 MMHG | RESPIRATION RATE: 18 BRPM | TEMPERATURE: 97 F | SYSTOLIC BLOOD PRESSURE: 140 MMHG

## 2024-11-07 LAB — COLONOSCOPY: NORMAL

## 2024-11-07 PROCEDURE — G8907 PT DOC NO EVENTS ON DISCHARG: HCPCS

## 2024-11-07 PROCEDURE — 88305 TISSUE EXAM BY PATHOLOGIST: CPT | Performed by: PATHOLOGY

## 2024-11-07 PROCEDURE — G8918 PT W/O PREOP ORDER IV AB PRO: HCPCS

## 2024-11-07 PROCEDURE — 45380 COLONOSCOPY AND BIOPSY: CPT

## 2024-11-07 RX ORDER — PROCHLORPERAZINE MALEATE 10 MG
10 TABLET ORAL EVERY 6 HOURS PRN
Status: DISCONTINUED | OUTPATIENT
Start: 2024-11-07 | End: 2024-11-08 | Stop reason: HOSPADM

## 2024-11-07 RX ORDER — LIDOCAINE 40 MG/G
CREAM TOPICAL
Status: DISCONTINUED | OUTPATIENT
Start: 2024-11-07 | End: 2024-11-08 | Stop reason: HOSPADM

## 2024-11-07 RX ORDER — NALOXONE HYDROCHLORIDE 0.4 MG/ML
0.2 INJECTION, SOLUTION INTRAMUSCULAR; INTRAVENOUS; SUBCUTANEOUS
Status: DISCONTINUED | OUTPATIENT
Start: 2024-11-07 | End: 2024-11-08 | Stop reason: HOSPADM

## 2024-11-07 RX ORDER — NALOXONE HYDROCHLORIDE 0.4 MG/ML
0.4 INJECTION, SOLUTION INTRAMUSCULAR; INTRAVENOUS; SUBCUTANEOUS
Status: DISCONTINUED | OUTPATIENT
Start: 2024-11-07 | End: 2024-11-08 | Stop reason: HOSPADM

## 2024-11-07 RX ORDER — FENTANYL CITRATE 50 UG/ML
INJECTION, SOLUTION INTRAMUSCULAR; INTRAVENOUS PRN
Status: DISCONTINUED | OUTPATIENT
Start: 2024-11-07 | End: 2024-11-07 | Stop reason: HOSPADM

## 2024-11-07 RX ORDER — ONDANSETRON 2 MG/ML
4 INJECTION INTRAMUSCULAR; INTRAVENOUS EVERY 6 HOURS PRN
Status: DISCONTINUED | OUTPATIENT
Start: 2024-11-07 | End: 2024-11-08 | Stop reason: HOSPADM

## 2024-11-07 RX ORDER — ONDANSETRON 2 MG/ML
4 INJECTION INTRAMUSCULAR; INTRAVENOUS
Status: DISCONTINUED | OUTPATIENT
Start: 2024-11-07 | End: 2024-11-08 | Stop reason: HOSPADM

## 2024-11-07 RX ORDER — ONDANSETRON 4 MG/1
4 TABLET, ORALLY DISINTEGRATING ORAL EVERY 6 HOURS PRN
Status: DISCONTINUED | OUTPATIENT
Start: 2024-11-07 | End: 2024-11-08 | Stop reason: HOSPADM

## 2024-11-07 RX ORDER — FLUMAZENIL 0.1 MG/ML
0.2 INJECTION, SOLUTION INTRAVENOUS
Status: ACTIVE | OUTPATIENT
Start: 2024-11-07 | End: 2024-11-07

## 2024-11-07 NOTE — H&P
[facility] Pre-op History and Physical    Jennifer Valentin MRN# [id number]   Age: 44 year old YOB: 1980     Date of Exam 11/7/2024         Primary care provider: Julissa Garza         Chief Complaint and/or Reason for Procedure:     Elevated fecal calprotectin       Active problem list:     Patient Active Problem List    Diagnosis Date Noted    Fibrocystic disease of left breast 05/30/2023     Priority: Medium    Class 3 severe obesity due to excess calories without serious comorbidity with body mass index (BMI) of 45.0 to 49.9 in adult (H) 01/31/2019     Priority: Medium    Elevated LFTs 02/05/2018     Priority: Medium    Subclinical hypothyroidism 11/30/2015     Priority: Medium    Family history of thyroid disease 11/17/2015     Priority: Medium            Medications (include herbals and vitamins):   Any Plavix use in the last 7 days? No     Current Outpatient Medications   Medication Sig Dispense Refill    bisacodyl (DULCOLAX) 5 MG EC tablet Two days prior to exam take two (2) tablets at 4pm. One day prior to exam take two (2) tablets at 4pm 4 tablet 0    levothyroxine (SYNTHROID/LEVOTHROID) 25 MCG tablet Take 1 tablet (25 mcg) by mouth daily 90 tablet 3    polyethylene glycol (GOLYTELY) 236 g suspension Two days before procedure at 5PM fill first container with water. Mix and drink an 8 oz glass every 15 minutes until HALF of the container is gone. Place the remainder in the refrigerator. One day before procedure at 5PM drink second half of bowel prep. Drink an 8 oz glass every 15 minutes until it is gone. Day of procedure 6 hours before arrival time fill the 2nd container with water. Mix and drink an 8 oz glass every 15 minutes until HALF of the container is gone. Discard the remaining solution. 8000 mL 0    omeprazole (PRILOSEC) 20 MG DR capsule Take 1 capsule (20 mg) by mouth daily 90 capsule 3     Current Facility-Administered Medications   Medication Dose  Route Frequency Provider Last Rate Last Admin    flumazenil (ROMAZICON) injection 0.2 mg  0.2 mg Intravenous q1 min prn McBeath, Amanda, DO        lidocaine (LMX4) kit   Topical Q1H PRN McBeath, Amanda, DO        lidocaine 1 % 0.1-1 mL  0.1-1 mL Other Q1H PRN McBeath, Amanda, DO        naloxone (NARCAN) injection 0.2 mg  0.2 mg Intravenous Q2 Min PRN McBeath, Amanda, DO        naloxone (NARCAN) injection 0.2 mg  0.2 mg Intramuscular Q2 Min PRN McBeath, Amanda, DO        naloxone (NARCAN) injection 0.4 mg  0.4 mg Intravenous Q2 Min PRN McBeath, Amanda, DO        naloxone (NARCAN) injection 0.4 mg  0.4 mg Intramuscular Q2 Min PRN McBeath, Amanda, DO        ondansetron (ZOFRAN ODT) ODT tab 4 mg  4 mg Oral Q6H PRN McBeath, Amanda, DO        Or    ondansetron (ZOFRAN) injection 4 mg  4 mg Intravenous Q6H PRN McBeath, Amanda, DO        ondansetron (ZOFRAN) injection 4 mg  4 mg Intravenous Once PRN McBeath, Amanda, DO        prochlorperazine (COMPAZINE) injection 10 mg  10 mg Intravenous Q6H PRN McBeath, Amanda, DO        Or    prochlorperazine (COMPAZINE) tablet 10 mg  10 mg Oral Q6H PRN McBeath, Amanda, DO        sodium chloride (PF) 0.9% PF flush 3 mL  3 mL Intracatheter Q8H McBeath, Amanda, DO        sodium chloride (PF) 0.9% PF flush 3 mL  3 mL Intracatheter q1 min prn McBeath, Amanda, DO                 Allergies:      Allergies   Allergen Reactions    Metronidazole Hives     Allergy to Latex? No  Allergy to tape?   No  Intolerances:             Physical Exam:   All vitals have been reviewed  Patient Vitals for the past 8 hrs:   BP Temp Temp src Pulse Resp SpO2   11/07/24 0753 132/70 97.5  F (36.4  C) Temporal 91 16 97 %     No intake/output data recorded.  Lungs:   no increased work of breathing     Cardiovascular:   RRR             Lab / Radiology Results:         Anesthetic risk and/or ASA classification:     2  Amanda McAdenath, DO

## 2024-12-06 ENCOUNTER — ANCILLARY PROCEDURE (OUTPATIENT)
Dept: MAMMOGRAPHY | Facility: CLINIC | Age: 44
End: 2024-12-06
Attending: NURSE PRACTITIONER
Payer: COMMERCIAL

## 2024-12-06 DIAGNOSIS — Z12.31 ENCOUNTER FOR SCREENING MAMMOGRAM FOR BREAST CANCER: ICD-10-CM

## 2024-12-06 PROCEDURE — 77067 SCR MAMMO BI INCL CAD: CPT | Performed by: STUDENT IN AN ORGANIZED HEALTH CARE EDUCATION/TRAINING PROGRAM

## 2024-12-06 PROCEDURE — 77063 BREAST TOMOSYNTHESIS BI: CPT | Performed by: STUDENT IN AN ORGANIZED HEALTH CARE EDUCATION/TRAINING PROGRAM

## 2025-03-11 DIAGNOSIS — K21.00 GASTROESOPHAGEAL REFLUX DISEASE WITH ESOPHAGITIS WITHOUT HEMORRHAGE: ICD-10-CM

## 2025-03-11 RX ORDER — OMEPRAZOLE 20 MG/1
20 CAPSULE, DELAYED RELEASE ORAL DAILY
Qty: 90 CAPSULE | Refills: 3 | Status: SHIPPED | OUTPATIENT
Start: 2025-03-11

## 2025-04-03 NOTE — PATIENT INSTRUCTIONS
Plan to return for fasting labs.    Go to any pharmacy 2 weeks after the second dose of your Covid vaccine to have your tetanus vaccine updated.      Preventive Health Recommendations  Female Ages 40 to 49    Yearly exam:     See your health care provider every year in order to  1. Review health changes.   2. Discuss preventive care.    3. Review your medicines if your doctor prescribed any.      Get a Pap test every three years (unless you have an abnormal result and your provider advises testing more often).      If you get Pap tests with HPV test, you only need to test every 5 years, unless you have an abnormal result. You do not need a Pap test if your uterus was removed (hysterectomy) and you have not had cancer.      You should be tested each year for STDs (sexually transmitted diseases), if you're at risk.     Ask your doctor if you should have a mammogram.      Have a colonoscopy (test for colon cancer) if someone in your family has had colon cancer or polyps before age 50.       Have a cholesterol test every 5 years.       Have a diabetes test (fasting glucose) after age 45. If you are at risk for diabetes, you should have this test every 3 years.    Shots: Get a flu shot each year. Get a tetanus shot every 10 years.     Nutrition:     Eat at least 5 servings of fruits and vegetables each day.    Eat whole-grain bread, whole-wheat pasta and brown rice instead of white grains and rice.    Get adequate Calcium and Vitamin D.      Lifestyle    Exercise at least 150 minutes a week (an average of 30 minutes a day, 5 days a week). This will help you control your weight and prevent disease.    Limit alcohol to one drink per day.    No smoking.     Wear sunscreen to prevent skin cancer.    See your dentist every six months for an exam and cleaning.   [Annual Wellness Visit] : an annual wellness visit

## 2025-04-04 SDOH — HEALTH STABILITY: PHYSICAL HEALTH: ON AVERAGE, HOW MANY DAYS PER WEEK DO YOU ENGAGE IN MODERATE TO STRENUOUS EXERCISE (LIKE A BRISK WALK)?: 4 DAYS

## 2025-04-04 SDOH — HEALTH STABILITY: PHYSICAL HEALTH: ON AVERAGE, HOW MANY MINUTES DO YOU ENGAGE IN EXERCISE AT THIS LEVEL?: 30 MIN

## 2025-04-04 ASSESSMENT — SOCIAL DETERMINANTS OF HEALTH (SDOH): HOW OFTEN DO YOU GET TOGETHER WITH FRIENDS OR RELATIVES?: ONCE A WEEK

## 2025-04-09 ENCOUNTER — MYC MEDICAL ADVICE (OUTPATIENT)
Dept: FAMILY MEDICINE | Facility: CLINIC | Age: 45
End: 2025-04-09

## 2025-04-09 ENCOUNTER — OFFICE VISIT (OUTPATIENT)
Dept: FAMILY MEDICINE | Facility: CLINIC | Age: 45
End: 2025-04-09
Attending: NURSE PRACTITIONER
Payer: COMMERCIAL

## 2025-04-09 VITALS
BODY MASS INDEX: 41.89 KG/M2 | SYSTOLIC BLOOD PRESSURE: 134 MMHG | HEIGHT: 64 IN | OXYGEN SATURATION: 99 % | WEIGHT: 245.4 LBS | DIASTOLIC BLOOD PRESSURE: 72 MMHG | TEMPERATURE: 97.8 F | RESPIRATION RATE: 18 BRPM | HEART RATE: 77 BPM

## 2025-04-09 DIAGNOSIS — N63.21 BREAST LUMP ON LEFT SIDE AT 1 O'CLOCK POSITION: ICD-10-CM

## 2025-04-09 DIAGNOSIS — Z00.00 ROUTINE GENERAL MEDICAL EXAMINATION AT A HEALTH CARE FACILITY: Primary | ICD-10-CM

## 2025-04-09 DIAGNOSIS — E03.8 SUBCLINICAL HYPOTHYROIDISM: ICD-10-CM

## 2025-04-09 DIAGNOSIS — J01.10 ACUTE NON-RECURRENT FRONTAL SINUSITIS: ICD-10-CM

## 2025-04-09 DIAGNOSIS — Z01.84 IMMUNITY STATUS TESTING: ICD-10-CM

## 2025-04-09 LAB
ANION GAP SERPL CALCULATED.3IONS-SCNC: 9 MMOL/L (ref 7–15)
BUN SERPL-MCNC: 14.9 MG/DL (ref 6–20)
CALCIUM SERPL-MCNC: 9.5 MG/DL (ref 8.8–10.4)
CHLORIDE SERPL-SCNC: 104 MMOL/L (ref 98–107)
CHOLEST SERPL-MCNC: 201 MG/DL
CREAT SERPL-MCNC: 0.69 MG/DL (ref 0.51–0.95)
EGFRCR SERPLBLD CKD-EPI 2021: >90 ML/MIN/1.73M2
FASTING STATUS PATIENT QL REPORTED: YES
FASTING STATUS PATIENT QL REPORTED: YES
GLUCOSE SERPL-MCNC: 99 MG/DL (ref 70–99)
HAV AB SER QL IA: NONREACTIVE
HCO3 SERPL-SCNC: 26 MMOL/L (ref 22–29)
HDLC SERPL-MCNC: 46 MG/DL
LDLC SERPL CALC-MCNC: 135 MG/DL
NONHDLC SERPL-MCNC: 155 MG/DL
POTASSIUM SERPL-SCNC: 4.7 MMOL/L (ref 3.4–5.3)
SODIUM SERPL-SCNC: 139 MMOL/L (ref 135–145)
TRIGL SERPL-MCNC: 98 MG/DL
TSH SERPL DL<=0.005 MIU/L-ACNC: 3.13 UIU/ML (ref 0.3–4.2)

## 2025-04-09 RX ORDER — AZITHROMYCIN 250 MG/1
TABLET, FILM COATED ORAL
Qty: 6 TABLET | Refills: 0 | Status: SHIPPED | OUTPATIENT
Start: 2025-04-09 | End: 2025-04-14

## 2025-04-09 RX ORDER — LEVOTHYROXINE SODIUM 25 UG/1
25 TABLET ORAL DAILY
Qty: 90 TABLET | Refills: 3 | Status: SHIPPED | OUTPATIENT
Start: 2025-04-09

## 2025-04-09 RX ORDER — LEVOTHYROXINE SODIUM 25 UG/1
25 TABLET ORAL DAILY
Qty: 90 TABLET | Refills: 3 | OUTPATIENT
Start: 2025-04-09

## 2025-04-09 ASSESSMENT — PAIN SCALES - GENERAL: PAINLEVEL_OUTOF10: MILD PAIN (2)

## 2025-04-09 NOTE — PATIENT INSTRUCTIONS
Patient Education   Preventive Care Advice   This is general advice given by our system to help you stay healthy. However, your care team may have specific advice just for you. Please talk to your care team about your preventive care needs.  Nutrition  Eat 5 or more servings of fruits and vegetables each day.  Try wheat bread, brown rice and whole grain pasta (instead of white bread, rice, and pasta).  Get enough calcium and vitamin D. Check the label on foods and aim for 100% of the RDA (recommended daily allowance).  Lifestyle  Exercise at least 150 minutes each week  (30 minutes a day, 5 days a week).  Do muscle strengthening activities 2 days a week. These help control your weight and prevent disease.  No smoking.  Wear sunscreen to prevent skin cancer.  Have a dental exam and cleaning every 6 months.  Yearly exams  See your health care team every year to talk about:  Any changes in your health.  Any medicines your care team has prescribed.  Preventive care, family planning, and ways to prevent chronic diseases.  Shots (vaccines)   HPV shots (up to age 26), if you've never had them before.  Hepatitis B shots (up to age 59), if you've never had them before.  COVID-19 shot: Get this shot when it's due.  Flu shot: Get a flu shot every year.  Tetanus shot: Get a tetanus shot every 10 years.  Pneumococcal, hepatitis A, and RSV shots: Ask your care team if you need these based on your risk.  Shingles shot (for age 50 and up)  General health tests  Diabetes screening:  Starting at age 35, Get screened for diabetes at least every 3 years.  If you are younger than age 35, ask your care team if you should be screened for diabetes.  Cholesterol test: At age 39, start having a cholesterol test every 5 years, or more often if advised.  Bone density scan (DEXA): At age 50, ask your care team if you should have this scan for osteoporosis (brittle bones).  Hepatitis C: Get tested at least once in your life.  STIs (sexually  transmitted infections)  Before age 24: Ask your care team if you should be screened for STIs.  After age 24: Get screened for STIs if you're at risk. You are at risk for STIs (including HIV) if:  You are sexually active with more than one person.  You don't use condoms every time.  You or a partner was diagnosed with a sexually transmitted infection.  If you are at risk for HIV, ask about PrEP medicine to prevent HIV.  Get tested for HIV at least once in your life, whether you are at risk for HIV or not.  Cancer screening tests  Cervical cancer screening: If you have a cervix, begin getting regular cervical cancer screening tests starting at age 21.  Breast cancer scan (mammogram): If you've ever had breasts, begin having regular mammograms starting at age 40. This is a scan to check for breast cancer.  Colon cancer screening: It is important to start screening for colon cancer at age 45.  Have a colonoscopy test every 10 years (or more often if you're at risk) Or, ask your provider about stool tests like a FIT test every year or Cologuard test every 3 years.  To learn more about your testing options, visit:   .  For help making a decision, visit:   https://bit.ly/bv94004.  Prostate cancer screening test: If you have a prostate, ask your care team if a prostate cancer screening test (PSA) at age 55 is right for you.  Lung cancer screening: If you are a current or former smoker ages 50 to 80, ask your care team if ongoing lung cancer screenings are right for you.  For informational purposes only. Not to replace the advice of your health care provider. Copyright   2023 [employer]. All rights reserved. Clinically reviewed by the [facility] Transitions Program. [employer] [id number] - REV 01/24.

## 2025-04-09 NOTE — PROGRESS NOTES
"Preventive Care Visit  [facility] NEO Bauman CNP, Family Medicine  Apr 9, 2025      Assessment & Plan     Subclinical hypothyroidism  Taking levothyroxine routinely.  Will recheck lab here today.  Refill sent  - levothyroxine (SYNTHROID/LEVOTHROID) 25 MCG tablet; Take 1 tablet (25 mcg) by mouth daily.  - TSH; Future    Breast lump on left side at 1 o'clock position  Order placed, plans to set up per self  - MA Diagnostic Left w/ Maikol; Future  - US Breast Left Complete 4 Quadrants; Future    Immunity status testing  Will check immunity status here today.  If is not immune would recommend receiving vaccine series  - Hepatitis A Antibody Total; Future    Routine general medical examination at a health care facility  Screening guidelines reviewed.   - Basic metabolic panel; Future  - Lipid panel reflex to direct LDL Fasting; Future    Acute non-recurrent frontal sinusitis  Reviewed treatment plan and role of antibiotics  Instructed to call or return for :  --symptoms not improved in 3 days  --Worsening fever or headaches  --new, unexplained symptoms develop  - azithromycin (ZITHROMAX) 250 MG tablet; Take 2 tablets (500 mg) by mouth daily for 1 day, THEN 1 tablet (250 mg) daily for 4 days.     The longitudinal plan of care for the diagnosis(es)/condition(s) as documented were addressed during this visit. Due to the added complexity in care, I will continue to support Jennifer in the subsequent management and with ongoing continuity of care.         BMI  Estimated body mass index is 41.8 kg/m  as calculated from the following:    Height as of this encounter: 1.632 m (5' 4.25\").    Weight as of this encounter: 111.3 kg (245 lb 6.4 oz).       Counseling  Appropriate preventive services were addressed with this patient via screening, questionnaire, or discussion as appropriate for fall prevention, nutrition, physical activity, Tobacco-use cessation, social engagement, weight loss and " cognition.  Checklist reviewing preventive services available has been given to the patient.  Reviewed patient's diet, addressing concerns and/or questions.       Nancy Rodriguez is a 44 year old, presenting for the following:  Physical (Fasting ) and Ear Problem (X 1 week, ear pressure and sinus pain )        4/9/2025     7:09 AM   Additional Questions   Roomed by PUALO Wright   Accompanied by n/a         4/9/2025     7:09 AM   Patient Reported Additional Medications   Patient reports taking the following new medications n/a          Ear Problem       Advance Care Planning  Patient does not have a Health Care Directive: Discussed advance care planning with patient; information given to patient to review.      4/4/2025   General Health   How would you rate your overall physical health? Good   Feel stress (tense, anxious, or unable to sleep) Not at all         4/4/2025   Nutrition   Three or more servings of calcium each day? Yes   Diet: Regular (no restrictions)   How many servings of fruit and vegetables per day? 4 or more   How many sweetened beverages each day? 0-1         4/4/2025   Exercise   Days per week of moderate/strenous exercise 4 days   Average minutes spent exercising at this level 30 min         4/4/2025   Social Factors   Frequency of gathering with friends or relatives Once a week   Worry food won't last until get money to buy more No   Food not last or not have enough money for food? No   Do you have housing? (Housing is defined as stable permanent housing and does not include staying ouside in a car, in a tent, in an abandoned building, in an overnight shelter, or couch-surfing.) Yes   Are you worried about losing your housing? No   Lack of transportation? No   Unable to get utilities (heat,electricity)? No         4/4/2025   Dental   Dentist two times every year? Yes           3/28/2024   TB Screening   Were you born outside of the [place]? No           Today's PHQ-2 Score:       4/8/2025     8:29  AM   PHQ-2 ( 1999 [employer])   Q1: Little interest or pleasure in doing things 0   Q2: Feeling down, depressed or hopeless 0   PHQ-2 Score 0    Q1: Little interest or pleasure in doing things Not at all   Q2: Feeling down, depressed or hopeless Not at all   PHQ-2 Score 0       Patient-reported           4/4/2025   Substance Use   Alcohol more than 3/day or more than 7/wk Not Applicable   Do you use any other substances recreationally? No     Social History     Tobacco Use    Smoking status: Never     Passive exposure: Never    Smokeless tobacco: Never   Vaping Use    Vaping status: Never Used   Substance Use Topics    Alcohol use: No    Drug use: No           12/6/2024   LAST FHS-7 RESULTS   1st degree relative breast or ovarian cancer No   Any relative bilateral breast cancer No   Any male have breast cancer No   Any ONE woman have BOTH breast AND ovarian cancer No   Any woman with breast cancer before 50yrs No   2 or more relatives with breast AND/OR ovarian cancer No   2 or more relatives with breast AND/OR bowel cancer No                4/4/2025   STI Screening   New sexual partner(s) since last STI/HIV test? No     History of abnormal Pap smear:         Latest Ref Rng & Units 2/10/2017     3:01 PM 2/10/2017     2:20 PM 9/19/2013    12:00 AM   PAP / HPV   PAP (Historical)   NIL  NIL    HPV 16 DNA NEG Negative      HPV 18 DNA NEG Negative      Other HR HPV NEG Negative        ASCVD Risk   The 10-year ASCVD risk score (Alex DENISE, et al., 2019) is: 1.2%    Values used to calculate the score:      Age: 44 years      Sex: Female      Is Non- : No      Diabetic: No      Tobacco smoker: No      Systolic Blood Pressure: 134 mmHg      Is BP treated: No      HDL Cholesterol: 44 mg/dL      Total Cholesterol: 201 mg/dL       Reviewed and updated as needed this visit by Provider                    Here today for physical. Is fasting for labs.  Continues to take levothyroxine.    Was seen by GI.   "Diagnosed with gastroparesis.  Has been trying to eat smaller meals more frequently and be active after eating.  Has lost approximately 15 pounds since last visit.    Did find a lump near left breast. Does not hurt. Does have a cold and wondering if could be lymphnode. No drainage or redness. Not able to see it, can only feel it. Does feel like is staying the same size.     Last Pap: Hyst 2019 due to heavy bleeding. Never an abnormal pap.   Last mammogram: 12/24-normal.   No family history of breast cancer.   Last BMD: N/A  Last Colonoscopy: 11/24-normal. Repeat in 10 years. No family history of colon cancer.   Last eye exam: yearly   Last dental exam: Every 6 mo  Last tetanus vaccine: 5/21  Last influenza vaccine: 9/24  Last shingles vaccine: N/A  Last pneumonia vaccine: N/A  Last COVID vaccine: Has had both doses  Last COVID booster: 9/24  Hep C screen (born [id number]):  done 2021  HIV screen: 2020  AAA screen (age 65-78 with smoking hx): N/A  IVD (HTN, Hyperlipid, Smoking): N/A  Lung CA screening (55-80, 30 pk smoking hx): N/A         Objective    Exam  /72   Pulse 77   Temp 97.8  F (36.6  C) (Temporal)   Resp 18   Ht 1.632 m (5' 4.25\")   Wt 111.3 kg (245 lb 6.4 oz)   LMP 11/20/2019 (LMP Unknown)   SpO2 99%   Breastfeeding No   BMI 41.80 kg/m     Estimated body mass index is 41.8 kg/m  as calculated from the following:    Height as of this encounter: 1.632 m (5' 4.25\").    Weight as of this encounter: 111.3 kg (245 lb 6.4 oz).    Physical Exam  Constitutional:       Appearance: Normal appearance. She is well-developed.   HENT:      Head: Normocephalic and atraumatic.      Right Ear: Tympanic membrane and external ear normal. No middle ear effusion.      Left Ear: Tympanic membrane and external ear normal.  No middle ear effusion.      Nose: No mucosal edema.      Right Sinus: Frontal sinus tenderness present.      Left Sinus: Frontal sinus tenderness present.   Neck:      Thyroid: No thyromegaly. "      Vascular: No carotid bruit.   Cardiovascular:      Rate and Rhythm: Normal rate and regular rhythm.      Heart sounds: Normal heart sounds.   Pulmonary:      Effort: Pulmonary effort is normal.      Breath sounds: Normal breath sounds.   Chest:       Abdominal:      General: Bowel sounds are normal.      Palpations: Abdomen is soft.   Skin:     General: Skin is warm and dry.   Neurological:      Mental Status: She is alert.   Psychiatric:         Behavior: Behavior normal.             Signed Electronically by: NEO Stone CNP

## (undated) DEVICE — SOL NACL 0.9% IRRIG 3000ML BAG 07972-08

## (undated) DEVICE — ADH SKIN CLOSURE PREMIERPRO EXOFIN 1.0ML 3470

## (undated) DEVICE — GLOVE PROTEXIS BLUE W/NEU-THERA 8.0  2D73EB80

## (undated) DEVICE — ENDO FORCEP ENDOJAW BIOPSY 2.8MMX230CM FB-220U

## (undated) DEVICE — SYR 50ML LL W/O NDL 309653

## (undated) DEVICE — DRAPE MAYO STAND 23X54 8337

## (undated) DEVICE — ENDO TROCAR FIRST ENTRY KII FIOS ADV FIX 05X100MM CFF03

## (undated) DEVICE — GLOVE PROTEXIS BLUE W/NEU-THERA 6.5  2D73EB65

## (undated) DEVICE — GOWN IMPERVIOUS SPECIALTY XLG/XLONG 32474

## (undated) DEVICE — ENDO TROCAR OPTICAL ACCESS KII Z-THRD 08X100MM C0Q19

## (undated) DEVICE — ENDO TROCAR FIRST ENTRY KII FIOS ADV FIX 12X100MM CFF73

## (undated) DEVICE — DECANTER VIAL 2006S

## (undated) DEVICE — STOCKING SLEEVE COMPRESSION CALF MED

## (undated) DEVICE — ESU HANDPIECE BIPOLAR THUNDERBEAT FC 5MMX35CM TB-0535FC

## (undated) DEVICE — SYR 05ML LL W/O NDL

## (undated) DEVICE — DEVICE RUMI II KOH-EFFICIENT 3.5CM KC-RUMI-35

## (undated) DEVICE — GLOVE PROTEXIS W/NEU-THERA 7.5  2D73TE75

## (undated) DEVICE — SU VICRYL 0 CT-1 36" J946H

## (undated) DEVICE — GLOVE PROTEXIS W/NEU-THERA 6.5  2D73TE65

## (undated) DEVICE — LUBRICATING JELLY 4.25OZ

## (undated) DEVICE — UTERINE MANIPULATOR RUMI 6.7MMX8CM UMB678

## (undated) DEVICE — ESU ENDO SCISSORS 5MM CVD 5DCS

## (undated) DEVICE — GOWN LG DISP 9515

## (undated) DEVICE — PAD CHUX UNDERPAD 23X24" 7136

## (undated) DEVICE — ENDO TROCAR SLEEVE KII ADV FIXATION 05X100MM CFS02

## (undated) DEVICE — GOWN XLG DISP 9545

## (undated) DEVICE — SYR EAR BULB 2OZ

## (undated) DEVICE — PAD PERI INDIV WRAP 11" 2022

## (undated) DEVICE — SYR 10ML LL W/O NDL

## (undated) DEVICE — TUBING CYSTO/BLADDER IRRIG SET 80" 06544-01

## (undated) DEVICE — KIT ENDO FIRST STEP DISINFECTANT 200ML W/POUCH EP-4

## (undated) DEVICE — SOL NACL 0.9% IRRIG 1000ML BOTTLE 07138-09

## (undated) DEVICE — SYR 20ML LL W/O NDL

## (undated) DEVICE — GLOVE PROTEXIS W/NEU-THERA 8.0  2D73TE80

## (undated) DEVICE — NDL BUTTERFLY 25GA .75" 367298

## (undated) DEVICE — PACK LAPAROSCOPY/PELVISCOPY STD

## (undated) DEVICE — DRAPE POUCH INSTRUMENT 3 POCKET 1018L

## (undated) DEVICE — SU VICRYL 4-0 FS-2 27" J422-H

## (undated) DEVICE — PREP SKIN SCRUB TRAY 4461A

## (undated) DEVICE — CATH TRAY FOLEY SURESTEP 16FR W/URINE MTR STATLK LF A303416A

## (undated) DEVICE — ANTIFOG SOLUTION W/FOAM PAD 31142527

## (undated) DEVICE — SUCTION IRR STRYKERFLOW II W/TIP 250-070-520

## (undated) DEVICE — TUBING C02 INSUFFLATION LAP FILTER HEATER 6198

## (undated) DEVICE — PREP CHLORAPREP 26ML TINTED ORANGE  260815

## (undated) DEVICE — ESU HOLSTER PLASTIC DISP E2400

## (undated) DEVICE — Device

## (undated) RX ORDER — EPINEPHRINE 1 MG/ML(1)
AMPUL (ML) INJECTION
Status: DISPENSED
Start: 2019-07-19

## (undated) RX ORDER — FENTANYL CITRATE 50 UG/ML
INJECTION, SOLUTION INTRAMUSCULAR; INTRAVENOUS
Status: DISPENSED
Start: 2019-12-09

## (undated) RX ORDER — BUPIVACAINE HYDROCHLORIDE AND EPINEPHRINE 5; 5 MG/ML; UG/ML
INJECTION, SOLUTION EPIDURAL; INTRACAUDAL; PERINEURAL
Status: DISPENSED
Start: 2019-12-09

## (undated) RX ORDER — ACETAMINOPHEN 325 MG/1
TABLET ORAL
Status: DISPENSED
Start: 2019-12-09

## (undated) RX ORDER — BUPIVACAINE HYDROCHLORIDE 2.5 MG/ML
INJECTION, SOLUTION INFILTRATION; PERINEURAL
Status: DISPENSED
Start: 2019-12-09

## (undated) RX ORDER — PHENAZOPYRIDINE HYDROCHLORIDE 200 MG/1
TABLET, FILM COATED ORAL
Status: DISPENSED
Start: 2019-12-09

## (undated) RX ORDER — LIDOCAINE HYDROCHLORIDE 10 MG/ML
INJECTION, SOLUTION EPIDURAL; INFILTRATION; INTRACAUDAL; PERINEURAL
Status: DISPENSED
Start: 2019-07-19

## (undated) RX ORDER — ONDANSETRON 2 MG/ML
INJECTION INTRAMUSCULAR; INTRAVENOUS
Status: DISPENSED
Start: 2019-12-09

## (undated) RX ORDER — DEXAMETHASONE SODIUM PHOSPHATE 10 MG/ML
INJECTION, SOLUTION INTRAMUSCULAR; INTRAVENOUS
Status: DISPENSED
Start: 2019-12-09

## (undated) RX ORDER — GABAPENTIN 300 MG/1
CAPSULE ORAL
Status: DISPENSED
Start: 2019-12-09

## (undated) RX ORDER — CEFAZOLIN SODIUM 1 G/3ML
INJECTION, POWDER, FOR SOLUTION INTRAMUSCULAR; INTRAVENOUS
Status: DISPENSED
Start: 2019-12-09

## (undated) RX ORDER — OXYCODONE HCL 10 MG/1
TABLET, FILM COATED, EXTENDED RELEASE ORAL
Status: DISPENSED
Start: 2019-12-09

## (undated) RX ORDER — SCOLOPAMINE TRANSDERMAL SYSTEM 1 MG/1
PATCH, EXTENDED RELEASE TRANSDERMAL
Status: DISPENSED
Start: 2019-12-09

## (undated) RX ORDER — LIDOCAINE HYDROCHLORIDE 20 MG/ML
JELLY TOPICAL
Status: DISPENSED
Start: 2019-12-09

## (undated) RX ORDER — PROPOFOL 10 MG/ML
INJECTION, EMULSION INTRAVENOUS
Status: DISPENSED
Start: 2019-12-09

## (undated) RX ORDER — KETOROLAC TROMETHAMINE 30 MG/ML
INJECTION, SOLUTION INTRAMUSCULAR; INTRAVENOUS
Status: DISPENSED
Start: 2019-12-09

## (undated) RX ORDER — FENTANYL CITRATE 50 UG/ML
INJECTION, SOLUTION INTRAMUSCULAR; INTRAVENOUS
Status: DISPENSED
Start: 2024-11-07